# Patient Record
Sex: FEMALE | Race: WHITE | NOT HISPANIC OR LATINO | Employment: OTHER | URBAN - METROPOLITAN AREA
[De-identification: names, ages, dates, MRNs, and addresses within clinical notes are randomized per-mention and may not be internally consistent; named-entity substitution may affect disease eponyms.]

---

## 2017-01-12 ENCOUNTER — GENERIC CONVERSION - ENCOUNTER (OUTPATIENT)
Dept: OTHER | Facility: OTHER | Age: 62
End: 2017-01-12

## 2017-10-03 DIAGNOSIS — Z13.820 ENCOUNTER FOR SCREENING FOR OSTEOPOROSIS: ICD-10-CM

## 2017-10-03 DIAGNOSIS — Z12.31 ENCOUNTER FOR SCREENING MAMMOGRAM FOR MALIGNANT NEOPLASM OF BREAST: ICD-10-CM

## 2017-10-20 ENCOUNTER — ALLSCRIPTS OFFICE VISIT (OUTPATIENT)
Dept: OTHER | Facility: OTHER | Age: 62
End: 2017-10-20

## 2017-12-01 ENCOUNTER — HOSPITAL ENCOUNTER (OUTPATIENT)
Dept: MAMMOGRAPHY | Facility: CLINIC | Age: 62
Discharge: HOME | End: 2017-12-01
Payer: COMMERCIAL

## 2017-12-01 DIAGNOSIS — Z12.31 ENCOUNTER FOR SCREENING MAMMOGRAM FOR MALIGNANT NEOPLASM OF BREAST: ICD-10-CM

## 2017-12-01 PROCEDURE — 77063 BREAST TOMOSYNTHESIS BI: CPT

## 2017-12-01 PROCEDURE — G0202 SCR MAMMO BI INCL CAD: HCPCS

## 2018-01-02 ENCOUNTER — GENERIC CONVERSION - ENCOUNTER (OUTPATIENT)
Dept: OTHER | Facility: OTHER | Age: 63
End: 2018-01-02

## 2018-01-05 ENCOUNTER — LAB CONVERSION - ENCOUNTER (OUTPATIENT)
Dept: OTHER | Facility: OTHER | Age: 63
End: 2018-01-05

## 2018-01-05 LAB
ADDITIONAL INFORMATION (HISTORICAL): NORMAL
ADEQUACY: (HISTORICAL): NORMAL
COMMENT (HISTORICAL): NORMAL
CYTOTECHNOLOGIST: (HISTORICAL): NORMAL
HPV MRNA E6/E7 (HISTORICAL): NOT DETECTED
INTERPRETATION (HISTORICAL): NORMAL
LMP (HISTORICAL): NORMAL
PREV. BX: (HISTORICAL): NORMAL
PREV. PAP (HISTORICAL): NORMAL
SOURCE (HISTORICAL): NORMAL

## 2018-01-15 DIAGNOSIS — R39.15 URGENCY OF URINATION: ICD-10-CM

## 2018-01-16 NOTE — PROGRESS NOTES
Assessment    1  Encounter for screening mammogram for breast cancer (V76 12) (Z12 31)   2  Encounter for annual physical exam (V70 0) (Z00 00)   3  Thyroid nodule (241 0) (E04 1)    Plan  Encounter for annual physical exam    · Always use a seat belt and shoulder strap when riding or driving a motor vehicle ;  Status:Complete;   Done: 67JAJ1503 08:53AM   · Begin or continue regular aerobic exercise  Gradually work up to at least {count1}  sessions of {dur1} of exercise a week ; Status:Complete;   Done: 48BOP1274 08:53AM   · Brush your teeth {freq1} and floss at least once a day ; Status:Complete;   Done:  38OJY3438 08:53AM   · Drink plenty of fluids ; Status:Complete;   Done: 92NEP5087 08:53AM   · Eat a normal well-balanced diet ; Status:Complete;   Done: 45XWV7963 08:53AM   · Use a sun block product with an SPF of 15 or more ; Status:Complete;   Done:  25HAY9644 08:53AM   · We encourage all of our patients to exercise regularly  30 minutes of exercise or physical  activity five or more days a week is recommended for children and adults ;  Status:Complete;   Done: 34ONC2182 08:53AM   · We recommend routine visits to a dentist ; Status:Complete;   Done: 59LZZ5917 08:53AM  Encounter for screening mammogram for breast cancer    · * MAMMO SCREENING BILATERAL W CAD; Status:Canceled;    · MAMMO SCREENING BILATERAL W 3D & CAD; Status:Canceled - Scheduling;    · MAMMO SCREENING BILATERAL W 3D & CAD; Status:Hold For - Scheduling; Requested  for:20Oct2017;     Discussion/Summary  health maintenance visit Currently, she eats an adequate diet and has an adequate exercise regimen  cervical cancer screening is current cervical cancer screening is managed by Dr Ketan Dunlap Breast cancer screening: the risks and benefits of breast cancer screening were discussed, mammogram has been ordered and mammogram is needed every year   Colorectal cancer screening: the risks and benefits of colorectal cancer screening were discussed and colonoscopy is needed every five years  Osteoporosis screening: will bring copy of [facility] screening to next appt  The risks and benefits of immunizations were discussed and patient declines immunizations  Advice and education were given regarding nutrition, aerobic exercise, weight loss, sunscreen use and seat belt use  Patient discussion: discussed with the patient  Will get copy of [facility]/BW and Thyroid US from Dr Olya Montalvo office    WIll have Derm - Dr Elham Baires - send copy of recent notes    PAP in allscripts    Pt will bring [facility] screening results to next appt for DEXA    Thyroid US - will await records/US  The patient was counseled regarding instructions for management, risk factor reductions, prognosis, patient and family education, impressions, risks and benefits of treatment options  Chief Complaint  NP PE      History of Present Illness  HM, Adult Female: The patient is being seen for a health maintenance evaluation  The last health maintenance visit was 1 year(s) ago  Social History: She is   Work status: working full time and occupation: CPA  The patient has never smoked cigarettes  She reports occasional alcohol use and drinking 2 drinks per week  Alcohol concern:  no personal concern about alcohol use  She has never used illicit drugs  General Health: The patient's health since the last visit is described as good  She has regular dental visits  She denies vision problems  She denies hearing loss  Immunizations status:  wears glasses, follows with optho regularly  Lifestyle:  She does not have a healthy diet  She does not have any weight concerns  She exercises regularly  She does not use tobacco  She consumes alcohol  She denies drug use  admits could cut back on sweets but does also eat fruits and veggies, cycles and did her first 5K last week     Reproductive health: the patient is postmenopausal    Screening: Cervical cancer screening includes a pap smear performed last year  Breast cancer screening includes a mammogram performed last year  Colorectal cancer screening includes a colonoscopy performed within the past ten years  Metabolic screening includes uncertain timing of her last lipid profile, uncertain timing of her last glucose screening, uncertain timing of her last thyroid function test and uncertain timing of her last DEXA  Cardiovascular risk factors: high LDL cholesterol, but no hypertension and no diabetes  General health risks: no previous breast cancer and no abnormal cervical cytology  Safety elements used: seat belt and sunscreen  Risk findings: no anxiety symptoms and no depression symptoms  HPI: Pt here for PE and here to establish care - previous pt of Dr Abi Vasquez - has since moved [address]    No BW in a few years - does think the only abnormality was a low Vit D    Does [facility] screenings every other year - thinks her TG and TC are "borderline"    PAP 2016 - no h/o abnormal PAP    Mammo Nov 2016 - no h/o abnormal mammo    She has had a colonoscopy - Dr Michele Harry - she follows with him once a year but was told her colo is every 3 yrs - she thinks she has a rectal polyp    Has been told she had a thyroid nodule    Has a dairy / lactose intolerance - when she eats a dairy food she will have loose stools and fecal urgency - no blood in the stool    She has never had flu vaccine as "her previous doctor didn't believe in it", risks/benefits reviewed      Review of Systems    Constitutional: no fever and no chills  Eyes: no eyesight problems  ENT: no sore throat and no nasal discharge  Cardiovascular: no chest pain, no palpitations and no lower extremity edema  Respiratory: no shortness of breath, no cough and no wheezing  Gastrointestinal: no abdominal pain, no constipation and no diarrhea  Genitourinary: no dysuria and no incontinence  Musculoskeletal: joint stiffness, but no joint swelling  Integumentary: no rashes     Neurological: no headache and no dizziness  Psychiatric: no anxiety and no depression  Endocrine: no muscle weakness  Hematologic/Lymphatic: no tendency for easy bleeding and no tendency for easy bruising  Active Problems    1  Colon cancer screening (V76 51) (Z12 11)   2  Encounter for gynecological examination (V72 31) (Z01 419)   3  Encounter for screening mammogram for breast cancer (V76 12) (Z12 31)   4  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)   5  Screening for osteoporosis (V82 81) (Z13 820)    Past Medical History    · Denied: History of Alcohol abuse   · History of Diverticulosis (562 10) (K57 90)   · Denied: History of depression   · Denied: History of substance abuse    Surgical History    · History of Hand Excision Of Tendon Cyst    Family History  Mother    · Denied: Family history of Alcohol abuse   · Denied: Family history of depression   · Family history of hypertension (V17 49) (Z82 49)   · Family history of malignant neoplasm of breast (V16 3) (Z80 3)   · Family history of stroke (V17 1) (Z82 3)   · Denied: Family history of substance abuse  Father    · Denied: Family history of Alcohol abuse   · Denied: Family history of depression   · Family history of heart attack (V17 3) (Z82 49)   · Denied: Family history of substance abuse  Sibling    · Denied: Family history of Alcohol abuse   · Denied: Family history of depression   · Denied: Family history of substance abuse  Brother    · Family history of diabetes mellitus (V18 0) (Z83 3)   · Family history of type C viral hepatitis (V18 8) (Z83 1)   · Family history of Kidney transplanted    Social History    · Full-time employment   · Never a smoker   · Rarely consumes alcohol (V49 89) (Z78 9)   ·  (V61 07) (Z63 4)    Current Meds   1  Multivitamins Oral Capsule; 1 tab po q day;    Therapy: [id number] to Recorded    Allergies    1  iodine    Vitals   Recorded: 12BAR3348 08:14AM   Temperature 98 2 F   Heart Rate 68   Systolic 111   Diastolic 70   Height 5 ft 2 in   Weight 126 lb    BMI Calculated 23 05   BSA Calculated 1 57     Physical Exam    Constitutional   General appearance: No acute distress, well appearing and well nourished  Eyes   Conjunctiva and lids: No swelling, erythema or discharge  Ears, Nose, Mouth, and Throat   External inspection of ears and nose: Normal     Otoscopic examination: Tympanic membranes translucent with normal light reflex  Canals patent without erythema  Lips, teeth, and gums: Normal, good dentition  Neck   Neck: Supple, symmetric, trachea midline, no masses  Thyroid: Abnormal   R upper pole thyroid nodule on exam    Pulmonary   Respiratory effort: No increased work of breathing or signs of respiratory distress  Auscultation of lungs: Clear to auscultation  Cardiovascular   Auscultation of heart: Normal rate and rhythm, normal S1 and S2, no murmurs  Examination of extremities for edema and/or varicosities: Normal     Abdomen   Abdomen: Non-tender, no masses  Lymphatic   Palpation of lymph nodes in neck: No lymphadenopathy  Musculoskeletal   Gait and station: Normal     Skin   Skin and subcutaneous tissue: Normal without rashes or lesions  Psychiatric   Judgment and insight: Normal     Mood and affect: Normal        Results/Data  PHQ-2 Adult Depression Screening 20Oct2017 08:17AM User, s     Test Name Result Flag Reference   PHQ-2 Adult Depression Score 0     Over the last two weeks, how often have you been bothered by any of the following problems?   Little interest or pleasure in doing things: Not at all - 0  Feeling down, depressed, or hopeless: Not at all - 0   PHQ-2 Adult Depression Screening Negative         Signatures   Electronically signed by : Sergio Luevano DO; Oct 20 2017  8:55AM EST                       (Author)

## 2018-01-22 VITALS
WEIGHT: 126 LBS | TEMPERATURE: 98.2 F | BODY MASS INDEX: 23.19 KG/M2 | HEART RATE: 68 BPM | SYSTOLIC BLOOD PRESSURE: 120 MMHG | DIASTOLIC BLOOD PRESSURE: 70 MMHG | HEIGHT: 62 IN

## 2018-01-24 VITALS
SYSTOLIC BLOOD PRESSURE: 114 MMHG | DIASTOLIC BLOOD PRESSURE: 72 MMHG | WEIGHT: 125.25 LBS | HEIGHT: 62 IN | BODY MASS INDEX: 23.05 KG/M2

## 2018-07-30 DIAGNOSIS — B00.9 HSV INFECTION: Primary | ICD-10-CM

## 2018-10-22 PROBLEM — E04.1 THYROID NODULE: Status: ACTIVE | Noted: 2017-10-20

## 2018-10-23 ENCOUNTER — OFFICE VISIT (OUTPATIENT)
Dept: FAMILY MEDICINE CLINIC | Facility: HOSPITAL | Age: 63
End: 2018-10-23
Payer: COMMERCIAL

## 2018-10-23 VITALS
HEIGHT: 62 IN | SYSTOLIC BLOOD PRESSURE: 112 MMHG | HEART RATE: 77 BPM | DIASTOLIC BLOOD PRESSURE: 78 MMHG | WEIGHT: 118 LBS | TEMPERATURE: 98.3 F | BODY MASS INDEX: 21.71 KG/M2

## 2018-10-23 DIAGNOSIS — Z13.29 SCREENING FOR ENDOCRINE, METABOLIC, AND IMMUNITY DISORDER: ICD-10-CM

## 2018-10-23 DIAGNOSIS — Z00.00 ANNUAL PHYSICAL EXAM: Primary | ICD-10-CM

## 2018-10-23 DIAGNOSIS — Z13.228 SCREENING FOR ENDOCRINE, METABOLIC, AND IMMUNITY DISORDER: ICD-10-CM

## 2018-10-23 DIAGNOSIS — Z13.0 SCREENING FOR ENDOCRINE, METABOLIC, AND IMMUNITY DISORDER: ICD-10-CM

## 2018-10-23 DIAGNOSIS — Z12.31 ENCOUNTER FOR SCREENING MAMMOGRAM FOR BREAST CANCER: ICD-10-CM

## 2018-10-23 DIAGNOSIS — Z13.820 SCREENING FOR OSTEOPOROSIS: ICD-10-CM

## 2018-10-23 DIAGNOSIS — Z13.220 SCREENING FOR CHOLESTEROL LEVEL: ICD-10-CM

## 2018-10-23 PROBLEM — A60.00 GENITAL HERPES: Status: ACTIVE | Noted: 2018-10-23

## 2018-10-23 PROCEDURE — 99396 PREV VISIT EST AGE 40-64: CPT | Performed by: INTERNAL MEDICINE

## 2018-10-23 RX ORDER — PEDI MULTIVIT NO.91/IRON FUM 15 MG
TABLET,CHEWABLE ORAL
COMMUNITY
Start: 2014-07-01 | End: 2018-10-23

## 2018-10-23 RX ORDER — VALACYCLOVIR HYDROCHLORIDE 500 MG/1
TABLET, FILM COATED ORAL
COMMUNITY
Start: 2015-07-14 | End: 2019-05-03 | Stop reason: SDUPTHER

## 2018-10-23 NOTE — PROGRESS NOTES
Patient is here for a routine physical exam   Last physical was Oct 2017  Health Maintenance:  BW: no fasting labs in chart    Mammo: Dec 2017  Dexa: Had [facility] screening 2 yrs ago but is not sure if it was done  PAP: Jan 2018 - Dr Nataly Stewart    Colonoscopy: Dec 2013 - 5 yr follow up    Diet/exercise: well balanced diet with fruits and vegetables, goes to the [facility] 1 day a week     Dentist: every 6 mos - no current dental issues    Eye Exam: wears glasses or contacts, eye exam is scheduled for next week, no current issues    Review of Systems   Constitutional: Negative for chills, fatigue, fever and unexpected weight change  HENT: Negative for congestion and sore throat  Eyes: Negative for pain and visual disturbance  Respiratory: Negative for cough, shortness of breath and wheezing  Cardiovascular: Negative for chest pain, palpitations and leg swelling  Gastrointestinal: Negative for abdominal pain, constipation, diarrhea and nausea  Endocrine: Negative for polydipsia and polyuria  Genitourinary: Negative for difficulty urinating and dysuria  Musculoskeletal: Positive for arthralgias  Negative for back pain and neck pain  Skin: Negative for rash and wound  Neurological: Negative for dizziness, syncope and headaches  Hematological: Negative for adenopathy  Psychiatric/Behavioral: Negative for behavioral problems and confusion  Social History     Social History    Marital status:       Spouse name: N/A    Number of children: N/A    Years of education: N/A     Occupational History     [employer]     Retired     Social History Main Topics    Smoking status: Never Smoker    Smokeless tobacco: Never Used    Alcohol use Yes      Comment: rarely    Drug use: No    Sexual activity: Not on file     Other Topics Concern    Not on file     Social History Narrative    , lives alone, working full time     family history includes Breast cancer in her mother; Diabetes in her brother; Heart attack in her father; Hepatitis in her brother; Hypertension in her mother; Kidney disease in her brother; Stroke in her mother      has no past medical history of Alcohol abuse; Depression; or Substance abuse ([facility] [address] )  Allergies   Allergen Reactions    Fish-Derived Products      Other reaction(s): flounder and rajni give her hives    Iodine Abdominal Pain and Hives         There is no immunization history on file for this patient  Vitals:    10/23/18 0737   BP: 112/78   Pulse: 77   Temp: 98 3 °F (36 8 °C)     Physical Exam   Constitutional: She appears well-developed and well-nourished  No distress  HENT:   Head: Normocephalic and atraumatic  Right Ear: External ear normal    Left Ear: External ear normal    Mouth/Throat: Oropharynx is clear and moist    Eyes: Conjunctivae are normal  Right eye exhibits no discharge  Left eye exhibits no discharge  Neck: Neck supple  No tracheal deviation present  Cardiovascular: Normal rate, regular rhythm and normal heart sounds  No murmur heard  Neg bruits B/L   Pulmonary/Chest: Effort normal and breath sounds normal  No respiratory distress  She has no wheezes  Abdominal: Soft  There is no tenderness  There is no guarding  Musculoskeletal: Normal range of motion  She exhibits no edema  Lymphadenopathy:     She has no cervical adenopathy  Neurological: She is alert  She exhibits normal muscle tone  Skin: Skin is warm and dry  No pallor  Psychiatric: She has a normal mood and affect  Her behavior is normal  Judgment normal    Nursing note and vitals reviewed          David Delgado was seen today for physical exam     Diagnoses and all orders for this visit:    Annual physical exam      - Healthy diet and exercise 5 days a week was encouraged      - Encouraged to con't with dentist and eye exam regularly      - Flu vaccine was encouraged and pt deferring at this time      - Routine BW was ordered    Screening for osteoporosis  -     DXA bone density spine hip and pelvis; Future    Encounter for screening mammogram for breast cancer  -     Mammo screening bilateral w cad;  Future    Screening for cholesterol level  -     Lipid panel    Screening for endocrine, metabolic, and immunity disorder  -     CBC and differential  -     Comprehensive metabolic panel      Return in about 1 year (around 10/23/2019) for Annual physical

## 2019-01-22 ENCOUNTER — TRANSCRIBE ORDERS (OUTPATIENT)
Dept: ADMINISTRATIVE | Facility: HOSPITAL | Age: 64
End: 2019-01-22

## 2019-01-22 ENCOUNTER — ANNUAL EXAM (OUTPATIENT)
Dept: OBGYN CLINIC | Facility: CLINIC | Age: 64
End: 2019-01-22
Payer: COMMERCIAL

## 2019-01-22 VITALS
BODY MASS INDEX: 22.26 KG/M2 | SYSTOLIC BLOOD PRESSURE: 120 MMHG | HEIGHT: 62 IN | DIASTOLIC BLOOD PRESSURE: 70 MMHG | WEIGHT: 121 LBS

## 2019-01-22 DIAGNOSIS — Z01.419 WOMEN'S ANNUAL ROUTINE GYNECOLOGICAL EXAMINATION: Primary | ICD-10-CM

## 2019-01-22 DIAGNOSIS — Z12.31 VISIT FOR SCREENING MAMMOGRAM: Primary | ICD-10-CM

## 2019-01-22 PROCEDURE — G0143 SCR C/V CYTO,THINLAYER,RESCR: HCPCS | Performed by: OBSTETRICS & GYNECOLOGY

## 2019-01-22 PROCEDURE — 99396 PREV VISIT EST AGE 40-64: CPT | Performed by: OBSTETRICS & GYNECOLOGY

## 2019-01-22 PROCEDURE — 87624 HPV HI-RISK TYP POOLED RSLT: CPT | Performed by: OBSTETRICS & GYNECOLOGY

## 2019-01-22 NOTE — PATIENT INSTRUCTIONS
The patient was informed of a stable gyn examination  A Pap smear was performed  We may consider getting a DEXA scan within the next 2 years  She will continue yearly mammograms  Return my office in 1 year  Continue to monitor and outbreaks of any genital herpes

## 2019-01-22 NOTE — PROGRESS NOTES
HPI    this is a 81-year-old white female, she is a  1 para 1 1 vaginal delivery approximately 22 years ago  She is now in menopause  Menopause symptoms are under control  She is not in a sexual relation with the present time  She denies any major  GI complaints  She does have a history of diverticulosis  She is due for a colonoscopy soon  She also has a history of thyroid nodule that is being watch which is benign  She is happy with her weight  She exercises on a regular basis  Denies any problem depression or anxiety  She sees a dentist on a regular basis  She does have a history recurrent genital herpes  Her last attack was almost a year ago  She is using over-the-counter preparations which are helpful  There are no new major family illnesses report this time  REVIEW OF SYSTEMS   all systems negative except for occasional recurrent genital herpes, kidney stones under control  PAST MEDICAL HISTORY     positive for genital herpes      PAST SURGICAL HISTORY    negative for any major abdominal pelvic surgery      FAMILY HISTORY    positive for alcohol abuse, positive for depression, positive for hypertension, positive for breast cancer, positive for stroke, positive for diabetes history of family kidney transplant      SOCIAL   HISTORY    negative for tobacco positive for social alcohol she is a       PHYSICAL EXAM    this is a well-developed well-nourished white female in no acute distress  Her BMI is 22 1  Her HEENT is was within normal limits  There is a history of a thyroid nodule on the right side  This is not palpable on today's exam   Cardiac exam shows a regular rhythm and rate normal S1-S2  There is no murmur  Her lungs are clear to auscultation bilaterally  Her breast exam symmetrical nontender does have a history of for 6 changes of the breast but unremarkable  Axilla clear bilaterally  She continue to get yearly mammograms    Abdominal exam is soft nontender no retraction or dimpling no masses  Positive bowel sounds  Pelvic exam the external genitalia normal limits the vagina is clean the cervix is closed uterus is anterior normal size is no cervical motion tenderness  A Pap smear was performed  There is no evidence of prolapse  IMPRESSION   stable menopausal gyn examination  Not in a sexual relationship  Menopause symptoms are under control  She continued to get colonoscopy as directed  We may consider arranging for a DEXA scan  She should return my office in 1 year

## 2019-01-28 LAB
HPV HR 12 DNA CVX QL NAA+PROBE: NEGATIVE
HPV16 DNA CVX QL NAA+PROBE: NEGATIVE
HPV18 DNA CVX QL NAA+PROBE: NEGATIVE

## 2019-01-29 LAB
LAB AP GYN PRIMARY INTERPRETATION: NORMAL
Lab: NORMAL

## 2019-05-03 DIAGNOSIS — B00.9 RECURRENT HERPES SIMPLEX: Primary | ICD-10-CM

## 2019-05-03 RX ORDER — VALACYCLOVIR HYDROCHLORIDE 500 MG/1
500 TABLET, FILM COATED ORAL DAILY
Qty: 90 TABLET | Refills: 2 | Status: SHIPPED | OUTPATIENT
Start: 2019-05-03 | End: 2022-06-19

## 2019-05-08 ENCOUNTER — TELEPHONE (OUTPATIENT)
Dept: OBGYN CLINIC | Facility: CLINIC | Age: 64
End: 2019-05-08

## 2019-05-08 ENCOUNTER — APPOINTMENT (OUTPATIENT)
Dept: LAB | Facility: HOSPITAL | Age: 64
End: 2019-05-08
Payer: COMMERCIAL

## 2019-05-08 DIAGNOSIS — R39.15 URINARY URGENCY: ICD-10-CM

## 2019-05-08 DIAGNOSIS — N30.00 ACUTE CYSTITIS WITHOUT HEMATURIA: ICD-10-CM

## 2019-05-08 DIAGNOSIS — N30.00 ACUTE CYSTITIS WITHOUT HEMATURIA: Primary | ICD-10-CM

## 2019-05-08 LAB
BACTERIA UR QL AUTO: ABNORMAL /HPF
BILIRUB UR QL STRIP: NEGATIVE
CLARITY UR: CLEAR
COLOR UR: YELLOW
GLUCOSE UR STRIP-MCNC: NEGATIVE MG/DL
HGB UR QL STRIP.AUTO: ABNORMAL
KETONES UR STRIP-MCNC: ABNORMAL MG/DL
LEUKOCYTE ESTERASE UR QL STRIP: ABNORMAL
MUCOUS THREADS UR QL AUTO: ABNORMAL
NITRITE UR QL STRIP: NEGATIVE
NON-SQ EPI CELLS URNS QL MICRO: ABNORMAL /HPF
PH UR STRIP.AUTO: 5.5 [PH]
PROT UR STRIP-MCNC: NEGATIVE MG/DL
RBC #/AREA URNS AUTO: ABNORMAL /HPF
SP GR UR STRIP.AUTO: 1.02 (ref 1–1.03)
UROBILINOGEN UR QL STRIP.AUTO: 0.2 E.U./DL
WBC #/AREA URNS AUTO: ABNORMAL /HPF

## 2019-05-08 PROCEDURE — 87086 URINE CULTURE/COLONY COUNT: CPT

## 2019-05-08 PROCEDURE — 81001 URINALYSIS AUTO W/SCOPE: CPT

## 2019-05-08 RX ORDER — NITROFURANTOIN 25; 75 MG/1; MG/1
100 CAPSULE ORAL 2 TIMES DAILY
Qty: 14 CAPSULE | Refills: 0 | Status: SHIPPED | OUTPATIENT
Start: 2019-05-08 | End: 2019-05-15

## 2019-05-08 RX ORDER — NITROFURANTOIN MACROCRYSTALS 50 MG/1
50 CAPSULE ORAL AS NEEDED
Qty: 30 CAPSULE | Refills: 1 | Status: SHIPPED | OUTPATIENT
Start: 2019-05-08 | End: 2019-10-24

## 2019-05-09 LAB — BACTERIA UR CULT: NORMAL

## 2019-06-04 ENCOUNTER — HOSPITAL ENCOUNTER (OUTPATIENT)
Dept: BONE DENSITY | Facility: IMAGING CENTER | Age: 64
Discharge: HOME | End: 2019-06-04
Payer: COMMERCIAL

## 2019-06-04 VITALS — BODY MASS INDEX: 21.71 KG/M2 | HEIGHT: 62 IN | WEIGHT: 118 LBS

## 2019-06-04 DIAGNOSIS — Z12.31 ENCOUNTER FOR SCREENING MAMMOGRAM FOR BREAST CANCER: ICD-10-CM

## 2019-06-04 PROCEDURE — 77067 SCR MAMMO BI INCL CAD: CPT

## 2019-06-04 PROCEDURE — 77063 BREAST TOMOSYNTHESIS BI: CPT

## 2019-10-23 ENCOUNTER — TELEPHONE (OUTPATIENT)
Dept: FAMILY MEDICINE CLINIC | Facility: HOSPITAL | Age: 64
End: 2019-10-23

## 2019-10-24 ENCOUNTER — OFFICE VISIT (OUTPATIENT)
Dept: FAMILY MEDICINE CLINIC | Facility: HOSPITAL | Age: 64
End: 2019-10-24
Payer: COMMERCIAL

## 2019-10-24 VITALS
WEIGHT: 120 LBS | BODY MASS INDEX: 22.66 KG/M2 | SYSTOLIC BLOOD PRESSURE: 122 MMHG | DIASTOLIC BLOOD PRESSURE: 72 MMHG | HEIGHT: 61 IN | HEART RATE: 78 BPM | TEMPERATURE: 97.8 F

## 2019-10-24 DIAGNOSIS — Z13.0 SCREENING FOR ENDOCRINE, METABOLIC AND IMMUNITY DISORDER: ICD-10-CM

## 2019-10-24 DIAGNOSIS — E28.319 PREMATURE MENOPAUSE: ICD-10-CM

## 2019-10-24 DIAGNOSIS — E04.1 THYROID NODULE: ICD-10-CM

## 2019-10-24 DIAGNOSIS — Z13.29 SCREENING FOR ENDOCRINE, METABOLIC AND IMMUNITY DISORDER: ICD-10-CM

## 2019-10-24 DIAGNOSIS — Z13.29 SCREENING FOR THYROID DISORDER: ICD-10-CM

## 2019-10-24 DIAGNOSIS — Z00.00 ANNUAL PHYSICAL EXAM: Primary | ICD-10-CM

## 2019-10-24 DIAGNOSIS — G47.00 INSOMNIA, UNSPECIFIED TYPE: ICD-10-CM

## 2019-10-24 DIAGNOSIS — Z13.228 SCREENING FOR ENDOCRINE, METABOLIC AND IMMUNITY DISORDER: ICD-10-CM

## 2019-10-24 DIAGNOSIS — Z13.220 SCREENING FOR CHOLESTEROL LEVEL: ICD-10-CM

## 2019-10-24 PROCEDURE — 99396 PREV VISIT EST AGE 40-64: CPT | Performed by: INTERNAL MEDICINE

## 2019-10-24 NOTE — PROGRESS NOTES
Deepthi Chapa MD    NAME: Asa Bettencourt  AGE: 60 y o  SEX: female  : 1955     DATE: 10/24/2019     Assessment and Plan:     Problem List Items Addressed This Visit        Endocrine    Thyroid nodule    Relevant Orders    CBC and differential    Comprehensive metabolic panel    Lipid panel    TSH, 3rd generation with Free T4 reflex       Other    Insomnia    Relevant Orders    CBC and differential    Comprehensive metabolic panel    Lipid panel    TSH, 3rd generation with Free T4 reflex      Other Visit Diagnoses     Annual physical exam    -  Primary    Screening for thyroid disorder        Relevant Orders    TSH, 3rd generation with Free T4 reflex    Screening for cholesterol level        Relevant Orders    Lipid panel    Screening for endocrine, metabolic and immunity disorder        Relevant Orders    CBC and differential    Comprehensive metabolic panel    Premature menopause        Relevant Orders    DXA bone density spine hip and pelvis          Immunizations and preventive care screenings were discussed with patient today  Appropriate education was printed on patient's after visit summary  Counseling:  Alcohol/drug use: discussed moderation in alcohol intake, the recommendations for healthy alcohol use, and avoidance of illicit drug use  Dental Health: discussed importance of regular tooth brushing, flossing, and dental visits  Injury prevention: discussed safety/seat belts, safety helmets, smoke detectors, carbon dioxide detectors, and smoking near bedding or upholstery  · Exercise: the importance of regular exercise/physical activity was discussed  Recommend exercise 3-5 times per week for at least 30 minutes     · Breast cancer screenin/19  · Cervical cancer screenin/19  · Colon cancer screening: 10/19 - 5 yr d/t tubular adenoma     Pt deferring flu vaccine      Return in about 1 year (around 10/24/2020) for Annual physical      Chief Complaint:     Chief Complaint   Patient presents with    Annual Exam      History of Present Illness:     Adult Annual Physical   Patient here for a comprehensive physical exam  The patient reports no problems  She had her colonoscopy earlier this month with Dr Keke Mac  She has f/u coming up to go over results  She was noted to have a tubular adenoma at the sigmoid colon  Diet and Physical Activity  · Diet/Nutrition: well balanced diet, limited junk food and limited fruits/vegetables  · Exercise: 1-2 times a week on average and 25 miles a week on stationary bike at [facility]  Depression Screening  PHQ-9 Depression Screening    PHQ-9:    Frequency of the following problems over the past two weeks:       Little interest or pleasure in doing things:  0 - not at all  Feeling down, depressed, or hopeless:  0 - not at all  PHQ-2 Score:  0       General Health  · Sleep: sleeps poorly, gets 4-6 hours of sleep on average and snores loudly  She has had a sleep study in the past and was not noted to have sleep apnea  · Hearing: decreased - bilateral  States she had a hearing test in the past and was told she had some high frequency loss  · Vision: no vision problems, most recent eye exam <1 year ago and wears glasses and contacts  · Dental: regular dental visits, brushes teeth twice daily and flosses regularly  /GYN Health  · Patient is: postmenopausal  · Last menstrual period: age 52  · Contraceptive method: postmenopausal   · PAP 1/19  · Mammo 1/19  · No baseline Dexa yet     Review of Systems:     Review of Systems   Constitutional: Negative for chills, fatigue, fever and unexpected weight change  HENT: Negative for congestion and sore throat  Eyes: Negative for pain and visual disturbance  Respiratory: Negative for cough, shortness of breath and wheezing  Cardiovascular: Negative for chest pain, palpitations and leg swelling     Gastrointestinal: Negative for abdominal pain, blood in stool, constipation, diarrhea and nausea  Endocrine: Negative for polydipsia and polyuria  Genitourinary: Negative for difficulty urinating and dysuria  Musculoskeletal: Negative for back pain and neck pain  Skin: Negative for rash and wound  Neurological: Negative for dizziness, light-headedness and headaches  Hematological: Negative for adenopathy  Psychiatric/Behavioral: Positive for sleep disturbance  Negative for behavioral problems and confusion  Past Medical History:     History reviewed  No pertinent past medical history  Past Surgical History:     Past Surgical History:   Procedure Laterality Date    CYST REMOVAL      hand excision of tendon cyst      Social History:     Social History     Socioeconomic History    Marital status:       Spouse name: None    Number of children: None    Years of education: None    Highest education level: None   Occupational History     Employer: [employer]     Comment: Retired   Social Needs    Financial resource strain: None    Food insecurity:     Worry: None     Inability: None    Transportation needs:     Medical: None     Non-medical: None   Tobacco Use    Smoking status: Never Smoker    Smokeless tobacco: Never Used   Substance and Sexual Activity    Alcohol use: Yes     Comment: occasional    Drug use: No    Sexual activity: Never   Lifestyle    Physical activity:     Days per week: None     Minutes per session: None    Stress: None   Relationships    Social connections:     Talks on phone: None     Gets together: None     Attends Gnosticism service: None     Active member of club or organization: None     Attends meetings of clubs or organizations: None     Relationship status: None    Intimate partner violence:     Fear of current or ex partner: None     Emotionally abused: None     Physically abused: None     Forced sexual activity: None   Other Topics Concern    None   Social History Narrative    , lives alone, working full time      Family History:     Family History   Problem Relation Age of Onset    Hypertension Mother     Breast cancer Mother 80    Stroke Mother     Heart attack Father     Diabetes Brother     Hepatitis Brother         type C viral    Kidney disease Brother         kidney transplant    Coronary artery disease Brother     No Known Problems Sister     No Known Problems Daughter     Ovarian cancer Paternal Grandmother         age unknown    No Known Problems Sister     No Known Problems Maternal Aunt     No Known Problems Maternal Aunt     No Known Problems Maternal Aunt     No Known Problems Paternal Aunt     No Known Problems Paternal Aunt     No Known Problems Paternal Aunt     No Known Problems Maternal Grandmother     No Known Problems Maternal Grandfather     No Known Problems Paternal Grandfather       Current Medications:     Current Outpatient Medications   Medication Sig Dispense Refill    Multiple Vitamin (MULTIVITAMINS PO) Take 1 tablet by mouth daily      valACYclovir (VALTREX) 500 mg tablet Take 1 tablet (500 mg total) by mouth daily for 90 days 1 tab PO q 12 hrs x 3 days as needed 90 tablet 2     No current facility-administered medications for this visit  Allergies: Allergies   Allergen Reactions    Fish-Derived Products      Other reaction(s): flounder and rajni give her hives    Iodine Abdominal Pain and Hives      Physical Exam:     /72 (BP Location: Right arm, Patient Position: Sitting, Cuff Size: Standard)   Pulse 78   Temp 97 8 °F (36 6 °C)   Ht 5' 1" (1 549 m)   Wt 54 4 kg (120 lb)   BMI 22 67 kg/m²     Physical Exam   Constitutional: She appears well-developed and well-nourished  No distress  HENT:   Head: Normocephalic and atraumatic  Right Ear: External ear normal    Left Ear: External ear normal    Mouth/Throat: Oropharynx is clear and moist  No oropharyngeal exudate     Eyes: Conjunctivae are normal  Right eye exhibits no discharge  Left eye exhibits no discharge  Neck: Neck supple  No tracheal deviation present  Cardiovascular: Normal rate, regular rhythm and normal heart sounds  Exam reveals no friction rub  No murmur heard  Pulmonary/Chest: Effort normal and breath sounds normal  No respiratory distress  She has no wheezes  She has no rales  Abdominal: Soft  She exhibits no distension  There is no tenderness  There is no rebound and no guarding  Musculoskeletal: She exhibits no edema  Lymphadenopathy:     She has no cervical adenopathy  Neurological: She is alert  She exhibits normal muscle tone  Skin: Skin is warm and dry  No rash noted  Psychiatric: She has a normal mood and affect  Her behavior is normal    Nursing note and vitals reviewed        Marily Tyler MD

## 2019-10-24 NOTE — PATIENT INSTRUCTIONS

## 2020-10-26 ENCOUNTER — OFFICE VISIT (OUTPATIENT)
Dept: FAMILY MEDICINE CLINIC | Facility: HOSPITAL | Age: 65
End: 2020-10-26
Payer: COMMERCIAL

## 2020-10-26 VITALS
DIASTOLIC BLOOD PRESSURE: 72 MMHG | WEIGHT: 126.6 LBS | SYSTOLIC BLOOD PRESSURE: 110 MMHG | HEART RATE: 72 BPM | HEIGHT: 62 IN | TEMPERATURE: 97.4 F | BODY MASS INDEX: 23.3 KG/M2

## 2020-10-26 DIAGNOSIS — Z00.00 ANNUAL PHYSICAL EXAM: Primary | ICD-10-CM

## 2020-10-26 DIAGNOSIS — Z23 ENCOUNTER FOR IMMUNIZATION: ICD-10-CM

## 2020-10-26 DIAGNOSIS — Z12.31 ENCOUNTER FOR SCREENING MAMMOGRAM FOR MALIGNANT NEOPLASM OF BREAST: ICD-10-CM

## 2020-10-26 PROCEDURE — 3008F BODY MASS INDEX DOCD: CPT | Performed by: INTERNAL MEDICINE

## 2020-10-26 PROCEDURE — 90471 IMMUNIZATION ADMIN: CPT | Performed by: INTERNAL MEDICINE

## 2020-10-26 PROCEDURE — 3725F SCREEN DEPRESSION PERFORMED: CPT | Performed by: INTERNAL MEDICINE

## 2020-10-26 PROCEDURE — 90682 RIV4 VACC RECOMBINANT DNA IM: CPT | Performed by: INTERNAL MEDICINE

## 2020-10-26 PROCEDURE — 99396 PREV VISIT EST AGE 40-64: CPT | Performed by: INTERNAL MEDICINE

## 2020-10-26 RX ORDER — CHOLECALCIFEROL (VITAMIN D3) 125 MCG
5000 CAPSULE ORAL DAILY
COMMUNITY

## 2020-11-17 ENCOUNTER — TELEPHONE (OUTPATIENT)
Dept: FAMILY MEDICINE CLINIC | Facility: HOSPITAL | Age: 65
End: 2020-11-17

## 2021-01-10 ENCOUNTER — HOSPITAL ENCOUNTER (OUTPATIENT)
Dept: MAMMOGRAPHY | Facility: IMAGING CENTER | Age: 66
Discharge: HOME | End: 2021-01-10
Payer: COMMERCIAL

## 2021-01-10 VITALS — BODY MASS INDEX: 20.14 KG/M2 | HEIGHT: 64 IN | WEIGHT: 118 LBS

## 2021-01-10 DIAGNOSIS — Z12.31 ENCOUNTER FOR SCREENING MAMMOGRAM FOR MALIGNANT NEOPLASM OF BREAST: ICD-10-CM

## 2021-01-10 DIAGNOSIS — Z12.31 VISIT FOR SCREENING MAMMOGRAM: ICD-10-CM

## 2021-01-10 PROCEDURE — 77067 SCR MAMMO BI INCL CAD: CPT

## 2021-01-10 PROCEDURE — 77063 BREAST TOMOSYNTHESIS BI: CPT

## 2021-03-22 ENCOUNTER — ANNUAL EXAM (OUTPATIENT)
Dept: OBGYN CLINIC | Facility: CLINIC | Age: 66
End: 2021-03-22
Payer: COMMERCIAL

## 2021-03-22 VITALS
HEIGHT: 62 IN | WEIGHT: 128.6 LBS | BODY MASS INDEX: 23.66 KG/M2 | SYSTOLIC BLOOD PRESSURE: 116 MMHG | DIASTOLIC BLOOD PRESSURE: 80 MMHG

## 2021-03-22 DIAGNOSIS — Z01.419 WOMEN'S ANNUAL ROUTINE GYNECOLOGICAL EXAMINATION: ICD-10-CM

## 2021-03-22 DIAGNOSIS — Z12.31 ENCOUNTER FOR SCREENING MAMMOGRAM FOR MALIGNANT NEOPLASM OF BREAST: Primary | ICD-10-CM

## 2021-03-22 PROCEDURE — 1036F TOBACCO NON-USER: CPT | Performed by: OBSTETRICS & GYNECOLOGY

## 2021-03-22 PROCEDURE — 99396 PREV VISIT EST AGE 40-64: CPT | Performed by: OBSTETRICS & GYNECOLOGY

## 2021-03-22 PROCEDURE — 3008F BODY MASS INDEX DOCD: CPT | Performed by: OBSTETRICS & GYNECOLOGY

## 2021-03-22 NOTE — PROGRESS NOTES
Assessment/Plan:      The patient was informed of a stable menopausal gyn examination  Mammograms colonoscopies up-to-date  We may consider call DEXA scan next year  She is content with her weight  She feels safe at home  She will get the COVID vaccine when available  She will continue to use Valtrex for any recurrent herpes outbreaks  Subjective:      Patient ID: Jean Marie Worthy is a 63 y o  female  HPI    This is a 63-year-old white female, she is a  1 para 1 1 vaginal delivery approximately 23 years ago  She is now menopausal   She is not in a sexual relationship  Menopause symptoms are under control  Colonoscopies and mammograms are up-to-date  She feels safe at home  She would like to get COVID vaccination when available  She denies any major  GI complaint  She does have a history of recurrent genital herpes which is under control  She has a dentist on a regular basis  She is content with her weight  Denies any problem depression or anxiety  She does have a history kidney stones during to control  There are no new major family illnesses report this time  The following portions of the patient's history were reviewed and updated as appropriate: allergies, current medications, past family history, past medical history, past social history, past surgical history and problem list     Review of Systems   All other systems reviewed and are negative  Objective:      /80   Ht 5' 2" (1 575 m)   Wt 58 3 kg (128 lb 9 6 oz)   BMI 23 52 kg/m²          Physical Exam  Vitals signs reviewed  Exam conducted with a chaperone present  Constitutional:       Appearance: Normal appearance  She is normal weight  HENT:      Head: Normocephalic  Neck:      Musculoskeletal: Normal range of motion and neck supple  Cardiovascular:      Rate and Rhythm: Normal rate and regular rhythm  Pulses: Normal pulses  Heart sounds: Normal heart sounds     Pulmonary: Effort: Pulmonary effort is normal       Breath sounds: Normal breath sounds  Chest:      Breasts:         Right: Normal          Left: Normal    Abdominal:      General: Abdomen is flat  Bowel sounds are normal  There is no distension  Palpations: Abdomen is soft  There is no hepatomegaly, splenomegaly or mass  Tenderness: There is no abdominal tenderness  Hernia: A hernia is present  There is no hernia in the left inguinal area or right inguinal area  Genitourinary:     General: Normal vulva  Pubic Area: No rash or pubic lice  Labia:         Right: No rash, tenderness, lesion or injury  Left: No rash, tenderness, lesion or injury  Urethra: No prolapse, urethral pain, urethral swelling or urethral lesion  Vagina: Normal  No signs of injury and foreign body  No vaginal discharge, erythema, tenderness, bleeding, lesions or prolapsed vaginal walls  Cervix: Normal       Uterus: Normal  Not deviated, not enlarged, not fixed, not tender and no uterine prolapse  Adnexa: Right adnexa normal and left adnexa normal         Right: No mass, tenderness or fullness  Left: No mass, tenderness or fullness  Rectum: Normal       Comments: The external genitalia within normal limits the vagina is clean consistent with menopause  The cervix is closed the uterus is small mobile nontender there is no cervical motion tenderness  Neck is clear bilaterally  There is no prolapse of the uterus  A Pap smear was not performed  The urethra bladder uterus all normal in appearance  Musculoskeletal: Normal range of motion  General: No swelling or tenderness  Skin:     General: Skin is warm and dry  Neurological:      General: No focal deficit present  Mental Status: She is alert and oriented to person, place, and time     Psychiatric:         Mood and Affect: Mood normal          Behavior: Behavior normal

## 2021-03-22 NOTE — PATIENT INSTRUCTIONS
The patient was informed of a stable menopausal gyn examination  She is content with her weight  She wants to get to COVID vaccine when available  Will consider DEXA scan next year after next visit  Colonoscopies up-to-date  Not sexually active  Screen to watch for recurrence genital herpes  She feels safe at home  She should call me if any new issues arise otherwise return my office in 1 year

## 2021-04-13 DIAGNOSIS — Z23 ENCOUNTER FOR IMMUNIZATION: ICD-10-CM

## 2021-10-12 ENCOUNTER — OFFICE VISIT (OUTPATIENT)
Dept: FAMILY MEDICINE CLINIC | Facility: HOSPITAL | Age: 66
End: 2021-10-12
Payer: COMMERCIAL

## 2021-10-12 VITALS
BODY MASS INDEX: 23.45 KG/M2 | DIASTOLIC BLOOD PRESSURE: 62 MMHG | WEIGHT: 127.4 LBS | TEMPERATURE: 97.9 F | HEIGHT: 62 IN | HEART RATE: 74 BPM | SYSTOLIC BLOOD PRESSURE: 108 MMHG

## 2021-10-12 DIAGNOSIS — Z13.228 SCREENING FOR ENDOCRINE, NUTRITIONAL, METABOLIC AND IMMUNITY DISORDER: ICD-10-CM

## 2021-10-12 DIAGNOSIS — Z01.818 PREOPERATIVE CLEARANCE: Primary | ICD-10-CM

## 2021-10-12 DIAGNOSIS — H25.9 AGE-RELATED CATARACT OF BOTH EYES, UNSPECIFIED AGE-RELATED CATARACT TYPE: ICD-10-CM

## 2021-10-12 DIAGNOSIS — Z13.220 SCREENING FOR CHOLESTEROL LEVEL: ICD-10-CM

## 2021-10-12 DIAGNOSIS — Z13.21 SCREENING FOR ENDOCRINE, NUTRITIONAL, METABOLIC AND IMMUNITY DISORDER: ICD-10-CM

## 2021-10-12 DIAGNOSIS — I44.7 LBBB (LEFT BUNDLE BRANCH BLOCK): ICD-10-CM

## 2021-10-12 DIAGNOSIS — Z13.0 SCREENING FOR ENDOCRINE, NUTRITIONAL, METABOLIC AND IMMUNITY DISORDER: ICD-10-CM

## 2021-10-12 DIAGNOSIS — Z13.29 SCREENING FOR ENDOCRINE, NUTRITIONAL, METABOLIC AND IMMUNITY DISORDER: ICD-10-CM

## 2021-10-12 PROCEDURE — 99214 OFFICE O/P EST MOD 30 MIN: CPT | Performed by: INTERNAL MEDICINE

## 2021-10-12 PROCEDURE — 93000 ELECTROCARDIOGRAM COMPLETE: CPT | Performed by: INTERNAL MEDICINE

## 2021-10-12 PROCEDURE — 3725F SCREEN DEPRESSION PERFORMED: CPT | Performed by: INTERNAL MEDICINE

## 2021-10-18 ENCOUNTER — OFFICE VISIT (OUTPATIENT)
Dept: OBGYN CLINIC | Facility: CLINIC | Age: 66
End: 2021-10-18
Payer: COMMERCIAL

## 2021-10-18 VITALS
WEIGHT: 131 LBS | DIASTOLIC BLOOD PRESSURE: 68 MMHG | BODY MASS INDEX: 24.11 KG/M2 | SYSTOLIC BLOOD PRESSURE: 110 MMHG | HEIGHT: 62 IN

## 2021-10-18 DIAGNOSIS — N95.0 POSTMENOPAUSAL BLEEDING: Primary | ICD-10-CM

## 2021-10-18 PROCEDURE — 99214 OFFICE O/P EST MOD 30 MIN: CPT | Performed by: OBSTETRICS & GYNECOLOGY

## 2021-10-18 PROCEDURE — 88305 TISSUE EXAM BY PATHOLOGIST: CPT | Performed by: PATHOLOGY

## 2021-10-18 PROCEDURE — 58100 BIOPSY OF UTERUS LINING: CPT | Performed by: OBSTETRICS & GYNECOLOGY

## 2021-10-18 PROCEDURE — 3008F BODY MASS INDEX DOCD: CPT | Performed by: OBSTETRICS & GYNECOLOGY

## 2021-10-18 PROCEDURE — 1036F TOBACCO NON-USER: CPT | Performed by: OBSTETRICS & GYNECOLOGY

## 2021-11-15 ENCOUNTER — APPOINTMENT (OUTPATIENT)
Dept: LAB | Facility: HOSPITAL | Age: 66
End: 2021-11-15
Payer: COMMERCIAL

## 2021-11-15 ENCOUNTER — HOSPITAL ENCOUNTER (OUTPATIENT)
Dept: NON INVASIVE DIAGNOSTICS | Age: 66
Discharge: HOME | End: 2021-11-15
Payer: COMMERCIAL

## 2021-11-15 ENCOUNTER — APPOINTMENT (OUTPATIENT)
Dept: ULTRASOUND IMAGING | Facility: HOSPITAL | Age: 66
End: 2021-11-15
Payer: COMMERCIAL

## 2021-11-15 ENCOUNTER — HOSPITAL ENCOUNTER (OUTPATIENT)
Dept: ULTRASOUND IMAGING | Facility: HOSPITAL | Age: 66
Discharge: HOME | End: 2021-11-15
Payer: COMMERCIAL

## 2021-11-15 VITALS
WEIGHT: 131 LBS | DIASTOLIC BLOOD PRESSURE: 68 MMHG | HEIGHT: 62 IN | BODY MASS INDEX: 24.11 KG/M2 | SYSTOLIC BLOOD PRESSURE: 110 MMHG

## 2021-11-15 DIAGNOSIS — N95.0 POSTMENOPAUSAL BLEEDING: ICD-10-CM

## 2021-11-15 DIAGNOSIS — I44.7 LBBB (LEFT BUNDLE BRANCH BLOCK): ICD-10-CM

## 2021-11-15 LAB
ALBUMIN SERPL BCP-MCNC: 3.6 G/DL (ref 3.5–5)
ALP SERPL-CCNC: 105 U/L (ref 46–116)
ALT SERPL W P-5'-P-CCNC: 20 U/L (ref 12–78)
ANION GAP SERPL CALCULATED.3IONS-SCNC: 12 MMOL/L (ref 4–13)
AORTIC ROOT: 2.8 CM
APICAL FOUR CHAMBER EJECTION FRACTION: 55 %
AST SERPL W P-5'-P-CCNC: 15 U/L (ref 5–45)
BASOPHILS NFR BLD AUTO: 1 % (ref 0–1)
BILIRUB SERPL-MCNC: 1.5 MG/DL (ref 0.2–1)
BUN SERPL-MCNC: 19 MG/DL (ref 5–25)
CALCIUM SERPL-MCNC: 8.6 MG/DL (ref 8.3–10.1)
CHLORIDE SERPL-SCNC: 99 MMOL/L (ref 100–108)
CHOLEST SERPL-MCNC: 164 MG/DL (ref 50–200)
CO2 SERPL-SCNC: 24 MMOL/L (ref 21–32)
CREAT SERPL-MCNC: 0.69 MG/DL (ref 0.6–1.3)
E WAVE DECELERATION TIME: 191 MS
EOSINOPHIL NFR BLD AUTO: 4 % (ref 0–6)
ERYTHROCYTE [DISTWIDTH] IN BLOOD BY AUTOMATED COUNT: 43.1 % (ref 11.6–15.1)
FRACTIONAL SHORTENING: 27 % (ref 28–44)
GFR SERPL CREATININE-BSD FRML MDRD: 92 ML/MIN/1.73SQ M
GLUCOSE P FAST SERPL-MCNC: 85 MG/DL (ref 65–99)
HCT VFR BLD AUTO: 40.8 % (ref 34.8–46.1)
HDLC SERPL-MCNC: 59 MG/DL
HGB BLD-MCNC: 12.7 G/DL (ref 11.5–15.4)
IMM GRANULOCYTES NFR BLD AUTO: 0 % (ref 0–2)
INTERVENTRICULAR SEPTUM IN DIASTOLE (PARASTERNAL SHORT AXIS VIEW): 0.6 CM
LAAS-AP2: 15.9 CM2
LAAS-AP4: 12.6 CM2
LDLC SERPL CALC-MCNC: 88 MG/DL (ref 0–100)
LEFT ATRIUM AREA SYSTOLE SINGLE PLANE A4C: 13 CM2
LEFT INTERNAL DIMENSION IN SYSTOLE: 3.2 CM (ref 2.1–4)
LEFT VENTRICLE DIASTOLIC VOLUME (MOD BIPLANE): 95 ML
LEFT VENTRICLE SYSTOLIC VOLUME (MOD BIPLANE): 38 ML
LEFT VENTRICULAR INTERNAL DIMENSION IN DIASTOLE: 4.4 CM (ref 3.76–5.59)
LEFT VENTRICULAR POSTERIOR WALL IN END DIASTOLE: 0.6 CM
LEFT VENTRICULAR STROKE VOLUME: 45 ML
LV EF: 60 %
LYMPHOCYTES NFR BLD AUTO: 30 % (ref 14–44)
MCH RBC QN AUTO: 29.2 PG (ref 26.8–34.3)
MCHC RBC AUTO-ENTMCNC: 31.3 G/DL (ref 31.4–37.4)
MCV RBC AUTO: 93 FL (ref 82–98)
MONOCYTES NFR BLD AUTO: 11 % (ref 4–12)
MV E'TISSUE VEL-SEP: 8 CM/S
MV PEAK A VEL: 1.09 M/S
MV PEAK E VEL: 67 CM/S
MV STENOSIS PRESSURE HALF TIME: 0 MS
NEUTS SEG NFR BLD AUTO: 54 % (ref 43–75)
NONHDLC SERPL-MCNC: 105 MG/DL
NRBC BLD AUTO-RTO: 0 /100 WBCS
PLATELET # BLD AUTO: 268 THOUSANDS/UL (ref 149–390)
PMV BLD AUTO: 9.3 FL (ref 8.9–12.7)
POTASSIUM SERPL-SCNC: 4.2 MMOL/L (ref 3.5–5.3)
PROT SERPL-MCNC: 7.4 G/DL (ref 6.4–8.2)
RBC # BLD AUTO: 4.35 MILLION/UL (ref 3.81–5.12)
RIGHT ATRIUM AREA SYSTOLE A4C: 10.8 CM2
RIGHT VENTRICLE ID DIMENSION: 2.8 CM
SL CV LV EF: 50
SL CV PED ECHO LEFT VENTRICLE DIASTOLIC VOLUME (MOD BIPLANE) 2D: 87 ML
SL CV PED ECHO LEFT VENTRICLE SYSTOLIC VOLUME (MOD BIPLANE) 2D: 42 ML
SODIUM SERPL-SCNC: 135 MMOL/L (ref 136–145)
TRICUSPID VALVE PEAK REGURGITATION VELOCITY: 2.26 M/S
TRICUSPID VALVE S': 42 CM/S
TRIGL SERPL-MCNC: 87 MG/DL
TV PEAK GRADIENT: 20 MMHG
WBC # BLD AUTO: 5.14 THOUSAND/UL (ref 4.31–10.16)
Z-SCORE OF LEFT VENTRICULAR DIMENSION IN END SYSTOLE: -0.41

## 2021-11-15 PROCEDURE — 36415 COLL VENOUS BLD VENIPUNCTURE: CPT | Performed by: INTERNAL MEDICINE

## 2021-11-15 PROCEDURE — 76830 TRANSVAGINAL US NON-OB: CPT

## 2021-11-15 PROCEDURE — 93306 TTE W/DOPPLER COMPLETE: CPT | Performed by: INTERNAL MEDICINE

## 2021-11-15 PROCEDURE — 85025 COMPLETE CBC W/AUTO DIFF WBC: CPT | Performed by: INTERNAL MEDICINE

## 2021-11-15 PROCEDURE — 93306 TTE W/DOPPLER COMPLETE: CPT

## 2021-11-15 PROCEDURE — 80053 COMPREHEN METABOLIC PANEL: CPT | Performed by: INTERNAL MEDICINE

## 2021-11-15 PROCEDURE — 80061 LIPID PANEL: CPT | Performed by: INTERNAL MEDICINE

## 2021-11-15 PROCEDURE — 76856 US EXAM PELVIC COMPLETE: CPT

## 2021-12-27 ENCOUNTER — TELEPHONE (OUTPATIENT)
Dept: FAMILY MEDICINE CLINIC | Facility: HOSPITAL | Age: 66
End: 2021-12-27

## 2021-12-31 PROCEDURE — U0005 INFEC AGEN DETEC AMPLI PROBE: HCPCS | Performed by: INTERNAL MEDICINE

## 2021-12-31 PROCEDURE — U0003 INFECTIOUS AGENT DETECTION BY NUCLEIC ACID (DNA OR RNA); SEVERE ACUTE RESPIRATORY SYNDROME CORONAVIRUS 2 (SARS-COV-2) (CORONAVIRUS DISEASE [COVID-19]), AMPLIFIED PROBE TECHNIQUE, MAKING USE OF HIGH THROUGHPUT TECHNOLOGIES AS DESCRIBED BY CMS-2020-01-R: HCPCS | Performed by: INTERNAL MEDICINE

## 2022-03-18 ENCOUNTER — HOSPITAL ENCOUNTER (OUTPATIENT)
Dept: MAMMOGRAPHY | Facility: IMAGING CENTER | Age: 67
Discharge: HOME | End: 2022-03-18
Payer: COMMERCIAL

## 2022-03-18 VITALS — WEIGHT: 130.07 LBS | BODY MASS INDEX: 23.94 KG/M2 | HEIGHT: 62 IN

## 2022-03-18 DIAGNOSIS — Z12.31 ENCOUNTER FOR SCREENING MAMMOGRAM FOR MALIGNANT NEOPLASM OF BREAST: ICD-10-CM

## 2022-03-18 DIAGNOSIS — Z12.31 VISIT FOR SCREENING MAMMOGRAM: ICD-10-CM

## 2022-03-18 PROCEDURE — 77067 SCR MAMMO BI INCL CAD: CPT

## 2022-03-18 PROCEDURE — 77063 BREAST TOMOSYNTHESIS BI: CPT

## 2022-06-14 ENCOUNTER — TELEPHONE (OUTPATIENT)
Dept: FAMILY MEDICINE CLINIC | Facility: HOSPITAL | Age: 67
End: 2022-06-14

## 2022-06-14 NOTE — TELEPHONE ENCOUNTER
Pt going to take another home covid test and call back with the results     c/o  bad cough sore throat  sinus prrssure congestion ear pain   no fever that she know of

## 2022-06-15 ENCOUNTER — OFFICE VISIT (OUTPATIENT)
Dept: FAMILY MEDICINE CLINIC | Facility: HOSPITAL | Age: 67
End: 2022-06-15
Payer: COMMERCIAL

## 2022-06-15 VITALS
TEMPERATURE: 98.5 F | BODY MASS INDEX: 23.37 KG/M2 | OXYGEN SATURATION: 97 % | WEIGHT: 127 LBS | SYSTOLIC BLOOD PRESSURE: 100 MMHG | HEART RATE: 95 BPM | DIASTOLIC BLOOD PRESSURE: 70 MMHG | HEIGHT: 62 IN

## 2022-06-15 DIAGNOSIS — J01.00 ACUTE MAXILLARY SINUSITIS, RECURRENCE NOT SPECIFIED: Primary | ICD-10-CM

## 2022-06-15 PROCEDURE — 99213 OFFICE O/P EST LOW 20 MIN: CPT | Performed by: FAMILY MEDICINE

## 2022-06-15 PROCEDURE — 1036F TOBACCO NON-USER: CPT | Performed by: FAMILY MEDICINE

## 2022-06-15 PROCEDURE — 3008F BODY MASS INDEX DOCD: CPT | Performed by: FAMILY MEDICINE

## 2022-06-15 RX ORDER — CYCLOSPORINE 0.5 MG/ML
1 EMULSION OPHTHALMIC EVERY 12 HOURS
COMMUNITY
Start: 2021-01-01

## 2022-06-15 RX ORDER — CEFUROXIME AXETIL 500 MG/1
500 TABLET ORAL EVERY 12 HOURS SCHEDULED
Qty: 14 TABLET | Refills: 0 | Status: SHIPPED | OUTPATIENT
Start: 2022-06-15 | End: 2022-06-22

## 2022-06-15 NOTE — PROGRESS NOTES
Assessment/Plan:         Diagnoses and all orders for this visit:    Acute maxillary sinusitis, recurrence not specified  Comments:  Start Fluticasone spray to help with congestion  Orders:  -     cefuroxime (CEFTIN) 500 mg tablet; Take 1 tablet (500 mg total) by mouth every 12 (twelve) hours for 7 days    Other orders  -     cycloSPORINE (RESTASIS) 0 05 % ophthalmic emulsion; Apply 1 drop to eye every 12 (twelve) hours          Subjective:      Patient ID: Weston Merino is a 61 y o  female  Acute visit for sinus symptoms    5 days of significant sinus congestion  Now with yellow sinus drainage  No fever she is aware of    Has spasmodic cough which causes some anterior pain  No wheezing    Taking vit C drink    Sleeping pretty well      The following portions of the patient's history were reviewed and updated as appropriate: allergies, current medications, past family history, past medical history, past social history, past surgical history and problem list     Review of Systems      Objective:      /70 (BP Location: Left arm, Patient Position: Sitting, Cuff Size: Standard)   Pulse 95   Temp 98 5 °F (36 9 °C) (Tympanic)   Ht 5' 2" (1 575 m)   Wt 57 6 kg (127 lb)   SpO2 97%   BMI 23 23 kg/m²          Physical Exam  Vitals and nursing note reviewed  Constitutional:       Appearance: Normal appearance  HENT:      Head: Normocephalic  Right Ear: Tympanic membrane is retracted  Left Ear: Hearing and tympanic membrane normal  Tympanic membrane is not retracted  Nose: Congestion present  Mouth/Throat:      Mouth: Mucous membranes are moist       Pharynx: No oropharyngeal exudate or posterior oropharyngeal erythema  Cardiovascular:      Rate and Rhythm: Normal rate and regular rhythm  Pulses: Normal pulses  Heart sounds: Normal heart sounds  Pulmonary:      Effort: Pulmonary effort is normal       Breath sounds: Normal breath sounds     Lymphadenopathy: Cervical: Cervical adenopathy present  Neurological:      Mental Status: She is alert

## 2022-06-19 ENCOUNTER — OFFICE VISIT (OUTPATIENT)
Dept: URGENT CARE | Facility: CLINIC | Age: 67
End: 2022-06-19
Payer: COMMERCIAL

## 2022-06-19 VITALS
OXYGEN SATURATION: 97 % | HEIGHT: 62 IN | HEART RATE: 89 BPM | SYSTOLIC BLOOD PRESSURE: 127 MMHG | RESPIRATION RATE: 18 BRPM | DIASTOLIC BLOOD PRESSURE: 66 MMHG | WEIGHT: 126 LBS | TEMPERATURE: 98.5 F | BODY MASS INDEX: 23.19 KG/M2

## 2022-06-19 DIAGNOSIS — J40 BRONCHITIS: ICD-10-CM

## 2022-06-19 DIAGNOSIS — J01.90 ACUTE SINUSITIS, RECURRENCE NOT SPECIFIED, UNSPECIFIED LOCATION: Primary | ICD-10-CM

## 2022-06-19 DIAGNOSIS — Z20.822 ENCOUNTER FOR LABORATORY TESTING FOR COVID-19 VIRUS: ICD-10-CM

## 2022-06-19 PROCEDURE — 87636 SARSCOV2 & INF A&B AMP PRB: CPT | Performed by: PHYSICIAN ASSISTANT

## 2022-06-19 PROCEDURE — 99213 OFFICE O/P EST LOW 20 MIN: CPT | Performed by: PHYSICIAN ASSISTANT

## 2022-06-19 RX ORDER — AZITHROMYCIN 250 MG/1
TABLET, FILM COATED ORAL
Qty: 6 TABLET | Refills: 0 | Status: SHIPPED | OUTPATIENT
Start: 2022-06-19 | End: 2022-06-23

## 2022-06-19 RX ORDER — PREDNISONE 10 MG/1
TABLET ORAL
Qty: 20 TABLET | Refills: 0 | Status: SHIPPED | OUTPATIENT
Start: 2022-06-19 | End: 2022-07-18

## 2022-06-19 RX ORDER — BENZONATATE 100 MG/1
100 CAPSULE ORAL 3 TIMES DAILY PRN
Qty: 20 CAPSULE | Refills: 0 | Status: SHIPPED | OUTPATIENT
Start: 2022-06-19 | End: 2022-07-18

## 2022-06-19 NOTE — PROGRESS NOTES
[facility]        NAME: Ashely Vivar is a 61 y o  female  : 1955    MRN: [id number]  DATE: 2022  TIME: 4:37 PM    /66   Pulse 89   Temp 98 5 °F (36 9 °C)   Resp 18   Ht 5' 2" (1 575 m)   Wt 57 2 kg (126 lb)   SpO2 97%   BMI 23 05 kg/m²     Assessment and Plan   Acute sinusitis, recurrence not specified, unspecified location [J01 90]  1  Acute sinusitis, recurrence not specified, unspecified location  azithromycin (ZITHROMAX) 250 mg tablet    benzonatate (TESSALON PERLES) 100 mg capsule    predniSONE 10 mg tablet   2  Bronchitis  azithromycin (ZITHROMAX) 250 mg tablet    benzonatate (TESSALON PERLES) 100 mg capsule    predniSONE 10 mg tablet   3  Encounter for laboratory testing for COVID-19 virus  Cov[facility]Flu-Col[facility] at [facility] or [facility]         Patient Instructions       Follow up with PCP in 3-5 days  Proceed to  ER if symptoms worsen  Chief Complaint     Chief Complaint   Patient presents with    Facial Pain     Sinus congestion since , went to PCP 5 days ago and given cefuroxime with minimal relief, pt with a cough that is getting worse         History of Present Illness       Pt with nasal congestion and sinus pressure and productive cough for about 5-6 days, pt on ceftin from family doctor "not helping at all"      Review of Systems   Review of Systems   HENT: Positive for congestion, sinus pressure and sinus pain  Eyes: Negative  Respiratory: Positive for cough  Cardiovascular: Negative  Gastrointestinal: Negative  Endocrine: Negative  Genitourinary: Negative  Musculoskeletal: Negative  Allergic/Immunologic: Negative  Neurological: Negative  Hematological: Negative  Psychiatric/Behavioral: Negative  All other systems reviewed and are negative          Current Medications       Current Outpatient Medications:     azithromycin (ZITHROMAX) 250 mg tablet, Take 2 tablets today then 1 tablet daily x 4 days, Disp: 6 tablet, Rfl: 0    benzonatate (TESSALON PERLES) 100 mg capsule, Take 1 capsule (100 mg total) by mouth 3 (three) times a day as needed for cough, Disp: 20 capsule, Rfl: 0    cefuroxime (CEFTIN) 500 mg tablet, Take 1 tablet (500 mg total) by mouth every 12 (twelve) hours for 7 days, Disp: 14 tablet, Rfl: 0    Cholecalciferol (Vitamin D3 Ultra Strength) 125 MCG (5000 UT) capsule, Take 5,000 Units by mouth daily, Disp: , Rfl:     cycloSPORINE (RESTASIS) 0 05 % ophthalmic emulsion, Apply 1 drop to eye every 12 (twelve) hours, Disp: , Rfl:     Multiple Vitamin (MULTIVITAMINS PO), Take 1 tablet by mouth daily, Disp: , Rfl:     predniSONE 10 mg tablet, 4 tabs po qd x 2 days then 3 tabs po qd x 2 days then 2 tabs po qd x 2 days then 1 tab po qd x 2 days, Disp: 20 tablet, Rfl: 0    Probiotic Product (PROBIOTIC ADVANCED PO), Take 1 tablet by mouth daily, Disp: , Rfl:     valACYclovir (VALTREX) 500 mg tablet, Take 1 tablet (500 mg total) by mouth daily for 90 days 1 tab PO q 12 hrs x 3 days as needed, Disp: 90 tablet, Rfl: 2    Current Allergies     Allergies as of 06/19/2022 - Reviewed 06/19/2022   Allergen Reaction Noted    Fish-derived products - food allergy  08/05/2015    Iodine - food allergy Abdominal Pain and Hives 08/02/2016            The following portions of the patient's history were reviewed and updated as appropriate: allergies, current medications, past family history, past medical history, past social history, past surgical history and problem list      Past Medical History:   Diagnosis Date    Thyroid nodule        Past Surgical History:   Procedure Laterality Date    CYST REMOVAL      hand excision of tendon cyst       Family History   Problem Relation Age of Onset    Hypertension Mother     Breast cancer Mother 80    Stroke Mother     Heart attack Father     Diabetes Brother     Hepatitis Brother         type C viral    Kidney disease Brother         kidney transplant    Coronary artery disease Brother     No Known Problems Sister     No Known Problems Daughter     Ovarian cancer Paternal Grandmother         age unknown    No Known Problems Sister     No Known Problems Maternal Aunt     No Known Problems Maternal Aunt     No Known Problems Maternal Aunt     No Known Problems Paternal Aunt     No Known Problems Paternal Aunt     No Known Problems Paternal Aunt     No Known Problems Maternal Grandmother     No Known Problems Maternal Grandfather     No Known Problems Paternal Grandfather     Prostate cancer Brother          Medications have been verified  Objective   /66   Pulse 89   Temp 98 5 °F (36 9 °C)   Resp 18   Ht 5' 2" (1 575 m)   Wt 57 2 kg (126 lb)   SpO2 97%   BMI 23 05 kg/m²        Physical Exam     Physical Exam  Vitals and nursing note reviewed  Constitutional:       Appearance: Normal appearance  She is normal weight  HENT:      Head: Normocephalic and atraumatic  Right Ear: Tympanic membrane, ear canal and external ear normal       Left Ear: Tympanic membrane, ear canal and external ear normal       Nose: Congestion and rhinorrhea present  Comments: Boggy nasal mucosa  Max sinus tender to plap  Yellow nasal d/c   Eyes:      Extraocular Movements: Extraocular movements intact  Conjunctiva/sclera: Conjunctivae normal       Pupils: Pupils are equal, round, and reactive to light  Cardiovascular:      Rate and Rhythm: Normal rate and regular rhythm  Pulses: Normal pulses  Heart sounds: Normal heart sounds  Pulmonary:      Effort: Pulmonary effort is normal       Comments: Minor coarse luis e nds   Abdominal:      General: Abdomen is flat  Bowel sounds are normal       Palpations: Abdomen is soft  Musculoskeletal:         General: Normal range of motion  Cervical back: Normal range of motion and neck supple  Skin:     General: Skin is warm  Capillary Refill: Capillary refill takes less than 2 seconds  Neurological:      General: No focal deficit present  Mental Status: She is alert and oriented to person, place, and time     Psychiatric:         Mood and Affect: Mood normal          Behavior: Behavior normal

## 2022-06-20 LAB
FLUAV RNA RESP QL NAA+PROBE: NEGATIVE
FLUBV RNA RESP QL NAA+PROBE: NEGATIVE
SARS-COV-2 RNA RESP QL NAA+PROBE: NEGATIVE

## 2022-07-02 ENCOUNTER — OFFICE VISIT (OUTPATIENT)
Dept: URGENT CARE | Facility: CLINIC | Age: 67
End: 2022-07-02
Payer: COMMERCIAL

## 2022-07-02 VITALS
WEIGHT: 128 LBS | BODY MASS INDEX: 23.55 KG/M2 | DIASTOLIC BLOOD PRESSURE: 55 MMHG | HEIGHT: 62 IN | SYSTOLIC BLOOD PRESSURE: 116 MMHG | OXYGEN SATURATION: 99 % | TEMPERATURE: 98.6 F | RESPIRATION RATE: 18 BRPM | HEART RATE: 78 BPM

## 2022-07-02 DIAGNOSIS — J01.90 ACUTE NON-RECURRENT SINUSITIS, UNSPECIFIED LOCATION: Primary | ICD-10-CM

## 2022-07-02 PROCEDURE — 99213 OFFICE O/P EST LOW 20 MIN: CPT | Performed by: PHYSICIAN ASSISTANT

## 2022-07-02 RX ORDER — DOXYCYCLINE HYCLATE 100 MG/1
100 CAPSULE ORAL EVERY 12 HOURS SCHEDULED
Qty: 14 CAPSULE | Refills: 0 | Status: SHIPPED | OUTPATIENT
Start: 2022-07-02 | End: 2022-07-09

## 2022-07-02 NOTE — PROGRESS NOTES
[facility]        NAME: Sury Chavira is a 61 y o  female  : 1955    MRN: [id number]  DATE: 2022  TIME: 1:08 PM    Assessment and Plan   Acute non-recurrent sinusitis, unspecified location [J01 90]  1  Acute non-recurrent sinusitis, unspecified location  doxycycline hyclate (VIBRAMYCIN) 100 mg capsule         Patient Instructions     Patient has persistent sinusitis  She has been on Z-Ramón and Ceftin as well as a course of steroids without resolution of her condition  She has taken multi symptom prep so I do believe she needs better overall symptom management  Discussed Flonase, decongestants, Mucinex, saline sinus rinse  I will give her a one-week course of doxycycline  If her condition is still not resolved despite this, then the next step would be an ENT consult  Follow up with PCP in 3-5 days  Proceed to  ER if symptoms worsen  Chief Complaint     Chief Complaint   Patient presents with    Ear Fullness     BL ear fullness, feels like water is swooshing around, has had this for 6 days, feels like she is in a tunnel, was seen and given antibiotics/steroids on          History of Present Illness       Patient presents with persistent symptoms of bilateral ear pain and pressure/fullness, sinus congestion with thick mucus  She has been seen twice previously and was 1st given Ceftin and then a Z-Ramón and an 8 day oral prednisone taper  She has completed all this but the symptoms still have not resolved  She is taking Tylenol cold and flu  Review of Systems   Review of Systems   Constitutional: Negative  HENT: Positive for congestion, ear pain (Pressure and fullness bilaterally), sinus pressure and sinus pain  Respiratory: Negative  Cardiovascular: Negative  Gastrointestinal: Negative  Genitourinary: Negative            Current Medications       Current Outpatient Medications:     Cholecalciferol (Vitamin D3 Ultra Strength) 125 MCG (5000 UT) capsule, Take 5,000 Units by mouth daily, Disp: , Rfl:     cycloSPORINE (RESTASIS) 0 05 % ophthalmic emulsion, Apply 1 drop to eye every 12 (twelve) hours, Disp: , Rfl:     doxycycline hyclate (VIBRAMYCIN) 100 mg capsule, Take 1 capsule (100 mg total) by mouth every 12 (twelve) hours for 7 days, Disp: 14 capsule, Rfl: 0    Multiple Vitamin (MULTIVITAMINS PO), Take 1 tablet by mouth daily, Disp: , Rfl:     predniSONE 10 mg tablet, 4 tabs po qd x 2 days then 3 tabs po qd x 2 days then 2 tabs po qd x 2 days then 1 tab po qd x 2 days, Disp: 20 tablet, Rfl: 0    Probiotic Product (PROBIOTIC ADVANCED PO), Take 1 tablet by mouth daily, Disp: , Rfl:     benzonatate (TESSALON PERLES) 100 mg capsule, Take 1 capsule (100 mg total) by mouth 3 (three) times a day as needed for cough (Patient not taking: Reported on 7/2/2022), Disp: 20 capsule, Rfl: 0    valACYclovir (VALTREX) 500 mg tablet, Take 1 tablet (500 mg total) by mouth daily for 90 days 1 tab PO q 12 hrs x 3 days as needed, Disp: 90 tablet, Rfl: 2    Current Allergies     Allergies as of 07/02/2022 - Reviewed 07/02/2022   Allergen Reaction Noted    Fish-derived products - food allergy  08/05/2015    Iodine - food allergy Abdominal Pain and Hives 08/02/2016            The following portions of the patient's history were reviewed and updated as appropriate: allergies, current medications, past family history, past medical history, past social history, past surgical history and problem list      Past Medical History:   Diagnosis Date    Thyroid nodule        Past Surgical History:   Procedure Laterality Date    CYST REMOVAL      hand excision of tendon cyst       Family History   Problem Relation Age of Onset    Hypertension Mother     Breast cancer Mother 80    Stroke Mother     Heart attack Father     Diabetes Brother     Hepatitis Brother         type C viral    Kidney disease Brother         kidney transplant    Coronary artery disease Brother     No Known Problems Sister     No Known Problems Daughter     Ovarian cancer Paternal Grandmother         age unknown    No Known Problems Sister     No Known Problems Maternal Aunt     No Known Problems Maternal Aunt     No Known Problems Maternal Aunt     No Known Problems Paternal Aunt     No Known Problems Paternal Aunt     No Known Problems Paternal Aunt     No Known Problems Maternal Grandmother     No Known Problems Maternal Grandfather     No Known Problems Paternal Grandfather     Prostate cancer Brother          Medications have been verified  Objective   /55   Pulse 78   Temp 98 6 °F (37 °C)   Resp 18   Ht 5' 2" (1 575 m)   Wt 58 1 kg (128 lb)   SpO2 99%   BMI 23 41 kg/m²   No LMP recorded  Patient is postmenopausal        Physical Exam     Physical Exam  Vitals reviewed  Constitutional:       General: She is not in acute distress  Appearance: She is well-developed  HENT:      Right Ear: Hearing, ear canal and external ear normal       Left Ear: Hearing, ear canal and external ear normal       Ears:      Comments: Bilateral dull TMs     Nose: Mucosal edema (Bilateral boggy turbinates) and congestion present  Mouth/Throat:      Mouth: Mucous membranes are moist       Pharynx: Oropharynx is clear  Cardiovascular:      Rate and Rhythm: Normal rate and regular rhythm  Pulses: Normal pulses  Heart sounds: Normal heart sounds  No murmur heard  Pulmonary:      Effort: Pulmonary effort is normal  No respiratory distress  Breath sounds: Normal breath sounds  Musculoskeletal:      Cervical back: Neck supple  Lymphadenopathy:      Cervical: No cervical adenopathy  Neurological:      Mental Status: She is alert and oriented to person, place, and time

## 2022-07-02 NOTE — PATIENT INSTRUCTIONS
Sinusitis   WHAT YOU NEED TO KNOW:   Sinusitis is inflammation or infection of your sinuses  Sinusitis is most often caused by a virus  Acute sinusitis may last up to 12 weeks  Chronic sinusitis lasts longer than 12 weeks  Recurrent sinusitis means you have 4 or more infections in 1 year  DISCHARGE INSTRUCTIONS:   Return to the emergency department if:   You have trouble breathing or wheezing that is getting worse  You have a stiff neck, a fever, or a bad headache  You cannot open your eye  Your eyeball bulges out or you cannot move your eye  You are more sleepy than normal, or you notice changes in your ability to think, move, or talk  You have swelling of your forehead or scalp  Call your doctor if:   You have vision changes, such as double vision  Your eye and eyelid are red, swollen, and painful  Your symptoms do not improve or go away after 10 days  You have nausea and are vomiting  Your nose is bleeding  You have questions or concerns about your condition or care  Medicines: Your symptoms may go away on their own  Your healthcare provider may recommend watchful waiting for up to 10 days before starting antibiotics  You may need any of the following:  Acetaminophen  decreases pain and fever  It is available without a doctor's order  Ask how much to take and how often to take it  Follow directions  Read the labels of all other medicines you are using to see if they also contain acetaminophen, or ask your doctor or pharmacist  Acetaminophen can cause liver damage if not taken correctly  Do not use more than 4 grams (4,000 milligrams) total of acetaminophen in one day  NSAIDs , such as ibuprofen, help decrease swelling, pain, and fever  This medicine is available with or without a doctor's order  NSAIDs can cause stomach bleeding or kidney problems in certain people   If you take blood thinner medicine, always ask your healthcare provider if NSAIDs are safe for you  Always read the medicine label and follow directions  Nasal steroid sprays  may help decrease inflammation in your nose and sinuses  Decongestants  help reduce swelling and drain mucus in the nose and sinuses  They may help you breathe easier  Antihistamines  help dry mucus in the nose and relieve sneezing  Antibiotics  help treat or prevent a bacterial infection  Take your medicine as directed  Contact your healthcare provider if you think your medicine is not helping or if you have side effects  Tell him or her if you are allergic to any medicine  Keep a list of the medicines, vitamins, and herbs you take  Include the amounts, and when and why you take them  Bring the list or the pill bottles to follow-up visits  Carry your medicine list with you in case of an emergency  Self-care:   Rinse your sinuses as directed  Use a sinus rinse device to rinse your nasal passages with a saline (salt water) solution or distilled water  Do not use tap water  This will help thin the mucus in your nose and rinse away pollen and dirt  It will also help reduce swelling so you can breathe normally  Use a humidifier  to increase air moisture in your home  This may make it easier for you to breathe and help decrease your cough  Sleep with your head elevated  Place an extra pillow under your head before you go to sleep to help your sinuses drain  Drink liquids as directed  Ask your healthcare provider how much liquid to drink each day and which liquids are best for you  Liquids will thin the mucus in your nose and help it drain  Avoid drinks that contain alcohol or caffeine  Do not smoke, and avoid secondhand smoke  Nicotine and other chemicals in cigarettes and cigars can make your symptoms worse  Ask your healthcare provider for information if you currently smoke and need help to quit  E-cigarettes or smokeless tobacco still contain nicotine   Talk to your healthcare provider before you use these products  Prevent the spread of germs:   Wash your hands often with soap and water  Wash your hands after you use the bathroom, change a child's diaper, or sneeze  Wash your hands before you prepare or eat food  Stay away from people who are sick  Some germs spread easily and quickly through contact  Follow up with your doctor as directed: You may be referred to an ear, nose, and throat specialist  Write down your questions so you remember to ask them during your visits  © Copyright [address] 2022 Information is for End User's use only and may not be sold, redistributed or otherwise used for commercial purposes  All illustrations and images included in CareNotes® are the copyrighted property of [employer]  or [facility] [employer]ise [employer]   The above information is an  only  It is not intended as medical advice for individual conditions or treatments  Talk to your doctor, nurse or pharmacist before following any medical regimen to see if it is safe and effective for you

## 2022-07-08 ENCOUNTER — TELEPHONE (OUTPATIENT)
Dept: FAMILY MEDICINE CLINIC | Facility: HOSPITAL | Age: 67
End: 2022-07-08

## 2022-07-08 NOTE — TELEPHONE ENCOUNTER
Patient thinks she has a UTI  She has urge to urinate but has trouble going  And when she does there is a burning sensation  Also has a little pain in her belly  [facility] [place] [facility]

## 2022-07-13 ENCOUNTER — TELEPHONE (OUTPATIENT)
Dept: FAMILY MEDICINE CLINIC | Facility: HOSPITAL | Age: 67
End: 2022-07-13

## 2022-07-13 DIAGNOSIS — H91.90 HEARING LOSS, UNSPECIFIED HEARING LOSS TYPE, UNSPECIFIED LATERALITY: Primary | ICD-10-CM

## 2022-07-13 NOTE — TELEPHONE ENCOUNTER
KEN IS A PATIENT OF DR Annabella Bell - HAS BEEN ON 3 BOUTS OF ANTIBIOTICS (ENDED UP GOING TO THE URGENT CARE TWICE) THE COUGH HAS SUBSIDED BUT NOW SHE CAN'T HEAR OUT OF HER EAR - SHE IS GETTING SCARED , FEELS LIKE SHE HAS WATER IN HER EAR AND EVERY ONCE IN AWHILE IT FEELS [address]    STILL FEELS LIKE SHE IS CONGESTED BUT SHE IS NOT BLOWING HER NOSE - SHE CAN'T HEAR [place] OR ANYONE WHO HAS A SOFT VOICE - ASKING FOR A ENT REFERRAL ([facility] REFERRAL)

## 2022-07-13 NOTE — TELEPHONE ENCOUNTER
Referral for ENT placed upon request   Sounds like inner ear dysfunction/eustachian tube dysfunction  Pt can be seen at our office for eval - sometimes oral steroid and/or nasal steroid and antihistamine can be of benefit

## 2022-07-18 ENCOUNTER — OFFICE VISIT (OUTPATIENT)
Dept: FAMILY MEDICINE CLINIC | Facility: HOSPITAL | Age: 67
End: 2022-07-18
Payer: COMMERCIAL

## 2022-07-18 VITALS
BODY MASS INDEX: 23.77 KG/M2 | HEIGHT: 62 IN | WEIGHT: 129.2 LBS | HEART RATE: 70 BPM | DIASTOLIC BLOOD PRESSURE: 62 MMHG | SYSTOLIC BLOOD PRESSURE: 116 MMHG | TEMPERATURE: 97.2 F

## 2022-07-18 DIAGNOSIS — Z13.0 SCREENING FOR ENDOCRINE, NUTRITIONAL, METABOLIC AND IMMUNITY DISORDER: ICD-10-CM

## 2022-07-18 DIAGNOSIS — Z13.228 SCREENING FOR ENDOCRINE, NUTRITIONAL, METABOLIC AND IMMUNITY DISORDER: ICD-10-CM

## 2022-07-18 DIAGNOSIS — Z13.21 SCREENING FOR ENDOCRINE, NUTRITIONAL, METABOLIC AND IMMUNITY DISORDER: ICD-10-CM

## 2022-07-18 DIAGNOSIS — H69.83 EUSTACHIAN TUBE DYSFUNCTION, BILATERAL: Primary | ICD-10-CM

## 2022-07-18 DIAGNOSIS — Z13.29 SCREENING FOR ENDOCRINE, NUTRITIONAL, METABOLIC AND IMMUNITY DISORDER: ICD-10-CM

## 2022-07-18 DIAGNOSIS — E04.1 THYROID NODULE: ICD-10-CM

## 2022-07-18 DIAGNOSIS — Z13.220 SCREENING FOR CHOLESTEROL LEVEL: ICD-10-CM

## 2022-07-18 PROBLEM — K63.5 POLYP OF COLON: Status: ACTIVE | Noted: 2022-07-18

## 2022-07-18 PROBLEM — K64.9 HEMORRHOIDS: Status: ACTIVE | Noted: 2022-07-18

## 2022-07-18 PROCEDURE — 99214 OFFICE O/P EST MOD 30 MIN: CPT | Performed by: INTERNAL MEDICINE

## 2022-07-18 RX ORDER — FLUTICASONE PROPIONATE 50 MCG
2 SPRAY, SUSPENSION (ML) NASAL DAILY
Qty: 16 G | Refills: 1 | Status: SHIPPED | OUTPATIENT
Start: 2022-07-18

## 2022-07-18 RX ORDER — PREDNISONE 10 MG/1
TABLET ORAL
Qty: 25 TABLET | Refills: 0 | Status: SHIPPED | OUTPATIENT
Start: 2022-07-18

## 2022-07-18 NOTE — PROGRESS NOTES
Assessment/Plan:       Diagnoses and all orders for this visit:    Eustachian tube dysfunction, bilateral  Comments:  Reassured pt that no sign of current infection and NO further abx needed, s/sx and exam c/w ETD - will do one more dose of Prednisone (SE reviewed) and Flonase and con't Sudafed, if no better in 2-3 wks will have to see ENT, call with new/worse symptoms in the interm  Orders:  -     predniSONE 10 mg tablet; 3 tab PO x 1 today then 2 tab PO bid x 3 days then 1 tab PO bid x 3 days then 1 tab PO q day x 3 days then stop  -     fluticasone (FLONASE) 50 mcg/act nasal spray; 2 sprays into each nostril daily  -     CBC and differential; Future  -     Comprehensive metabolic panel; Future  -     Lipid panel; Future  -     TSH, 3rd generation with Free T4 reflex; Future    Thyroid nodule  Comments:  Check TSH with labs in Nov - order given  Orders:  -     CBC and differential; Future  -     Comprehensive metabolic panel; Future  -     Lipid panel; Future  -     TSH, 3rd generation with Free T4 reflex; Future    Screening for cholesterol level  -     Lipid panel; Future    Screening for endocrine, nutritional, metabolic and immunity disorder  -     CBC and differential; Future  -     Comprehensive metabolic panel; Future  -     Lipid panel; Future  -     TSH, 3rd generation with Free T4 reflex; Future      Colonoscopy 10/19- 5 yrs    Mammo 3/22    PAP 1/19 -5 yrs    BW 11/21 - order given for Nov        Subjective:      Patient ID: Mauri Stinson is a 61 y o  female  HPI Pt here for an acute visit    Pt here for ongoing ear symptoms  She was seen 6/15/22 and tx for sinusitis with Cefuroxime  Symptoms did not improve and she saw  and was tx with Zpak and Prednisone 6/19/22  She then was given a 3rd round of abx (Doxy) 7/2/22 for persistent ear symptoms  Today she con't to feel like "I'm in a tunnel and have a lot of fluids"  She notes decrease in hearing to high pitched voices and whispers   She feels like popping is going on in her ears  Currently she does not feel ill  She notes no F/C/congestion/runny nose/sinus pain but has some pressure  She denies ST/N/V/D/urinary symptoms/rashes  She notes no HA's/dizziness  She denies ringing in the ear  Currently she is using Sudafed w/o great benefit  Review of Systems   Constitutional: Negative for chills and fever  HENT: Positive for hearing loss  Negative for congestion, ear discharge, ear pain and sore throat  Respiratory: Negative for cough and shortness of breath  Gastrointestinal: Negative for diarrhea, nausea and vomiting  Genitourinary: Negative for difficulty urinating and dysuria  Musculoskeletal: Negative for back pain and neck pain  Skin: Negative for rash and wound  Neurological: Negative for dizziness, light-headedness and headaches  Hematological: Negative for adenopathy  Objective:    /62   Pulse 70   Temp (!) 97 2 °F (36 2 °C) (Tympanic)   Ht 5' 2" (1 575 m)   Wt 58 6 kg (129 lb 3 2 oz)   BMI 23 63 kg/m²      Physical Exam  Vitals and nursing note reviewed  Constitutional:       General: She is not in acute distress  Appearance: She is well-developed  HENT:      Head: Normocephalic and atraumatic  Right Ear: Tympanic membrane and external ear normal  There is no impacted cerumen  Left Ear: External ear normal  There is no impacted cerumen  Ears:      Comments: L TM with effusion  Eyes:      General:         Right eye: No discharge  Left eye: No discharge  Conjunctiva/sclera: Conjunctivae normal    Neck:      Trachea: No tracheal deviation  Cardiovascular:      Rate and Rhythm: Normal rate and regular rhythm  Heart sounds: Normal heart sounds  No murmur heard  No friction rub  Pulmonary:      Effort: Pulmonary effort is normal  No respiratory distress  Breath sounds: Normal breath sounds  No wheezing, rhonchi or rales     Musculoskeletal: Cervical back: Neck supple  Lymphadenopathy:      Cervical: No cervical adenopathy  Skin:     General: Skin is warm  Coloration: Skin is not pale  Findings: No rash  Neurological:      General: No focal deficit present  Mental Status: She is alert  Motor: No abnormal muscle tone  Gait: Gait normal    Psychiatric:         Mood and Affect: Mood normal          Behavior: Behavior normal          Thought Content:  Thought content normal          Judgment: Judgment normal

## 2022-10-11 ENCOUNTER — ANNUAL EXAM (OUTPATIENT)
Dept: OBGYN CLINIC | Facility: CLINIC | Age: 67
End: 2022-10-11
Payer: COMMERCIAL

## 2022-10-11 VITALS
WEIGHT: 130 LBS | BODY MASS INDEX: 23.92 KG/M2 | DIASTOLIC BLOOD PRESSURE: 68 MMHG | HEIGHT: 62 IN | SYSTOLIC BLOOD PRESSURE: 118 MMHG

## 2022-10-11 DIAGNOSIS — G47.00 INSOMNIA, UNSPECIFIED TYPE: Primary | ICD-10-CM

## 2022-10-11 DIAGNOSIS — Z01.419 WOMEN'S ANNUAL ROUTINE GYNECOLOGICAL EXAMINATION: ICD-10-CM

## 2022-10-11 DIAGNOSIS — Z12.31 ENCOUNTER FOR SCREENING MAMMOGRAM FOR MALIGNANT NEOPLASM OF BREAST: ICD-10-CM

## 2022-10-11 PROCEDURE — G0145 SCR C/V CYTO,THINLAYER,RESCR: HCPCS | Performed by: OBSTETRICS & GYNECOLOGY

## 2022-10-11 PROCEDURE — S0612 ANNUAL GYNECOLOGICAL EXAMINA: HCPCS | Performed by: OBSTETRICS & GYNECOLOGY

## 2022-10-11 PROCEDURE — G0476 HPV COMBO ASSAY CA SCREEN: HCPCS | Performed by: OBSTETRICS & GYNECOLOGY

## 2022-10-11 RX ORDER — ZOLPIDEM TARTRATE 5 MG/1
5 TABLET ORAL
Qty: 6 TABLET | Refills: 1 | Status: SHIPPED | OUTPATIENT
Start: 2022-10-11

## 2022-10-11 NOTE — PROGRESS NOTES
Assessment/Plan:    The patient was informed of a stable menopausal gyn examination  A Pap smear was performed  She feels safe at home  She is not in a relationship at the present time  She will be retiring next year  There are no new major family illnesses report  Denies any problem depression or anxiety  She will continue seeing a dentist in family doctor  She should return my office in 1 year unless new issues occur  Subjective:      Patient ID: Preet Loo is a 74 y o  female  HPI    This is a 68-year-old white female, she is a  1 para 1  She is menopausal   Her not sexually active  Denies any symptoms of menopause  Like to lose more weight  She has evaluate for recent sinusitis she is now doing better  Colonoscopies and mammograms up-to-date  She feels safe at home  Denies any problem depression or anxiety  Denies any major  GI complaint  She has a dentist on a regular basis  She has plans on retiring in September of next year  The following portions of the patient's history were reviewed and updated as appropriate: allergies, current medications, past family history, past medical history, past social history, past surgical history and problem list     Review of Systems   HENT: Negative for rhinorrhea  All other systems reviewed and are negative  Objective:      /68   Ht 5' 2" (1 575 m)   Wt 59 kg (130 lb)   BMI 23 78 kg/m²          Physical Exam  Vitals reviewed  Exam conducted with a chaperone present  Constitutional:       Appearance: Normal appearance  She is normal weight  HENT:      Head: Normocephalic and atraumatic  Mouth/Throat:      Mouth: Mucous membranes are moist    Eyes:      Pupils: Pupils are equal, round, and reactive to light  Cardiovascular:      Rate and Rhythm: Normal rate and regular rhythm  Pulses: Normal pulses  Heart sounds: Normal heart sounds     Pulmonary:      Effort: Pulmonary effort is normal       Breath sounds: Normal breath sounds  Chest:   Breasts: Breasts are symmetrical       Right: Normal  No swelling, bleeding, inverted nipple, mass, nipple discharge, skin change, tenderness, axillary adenopathy or supraclavicular adenopathy  Left: Normal  No swelling, bleeding, inverted nipple, mass, nipple discharge, skin change, tenderness, axillary adenopathy or supraclavicular adenopathy  Abdominal:      General: Abdomen is flat  Bowel sounds are normal  There is no distension  Palpations: Abdomen is soft  There is no hepatomegaly, splenomegaly or mass  Tenderness: There is no abdominal tenderness  There is no right CVA tenderness, left CVA tenderness, guarding or rebound  Hernia: No hernia is present  There is no hernia in the left inguinal area or right inguinal area  Genitourinary:     General: Normal vulva  Pubic Area: No rash or pubic lice  Labia:         Right: No rash, tenderness, lesion or injury  Left: No rash, tenderness, lesion or injury  Urethra: No prolapse, urethral pain, urethral swelling or urethral lesion  Vagina: Normal  No signs of injury and foreign body  No vaginal discharge, erythema, tenderness or bleeding  Cervix: Normal       Uterus: Normal  Not deviated, not enlarged, not fixed, not tender and no uterine prolapse  Adnexa: Right adnexa normal and left adnexa normal         Right: No mass, tenderness or fullness  Left: No mass, tenderness or fullness  Rectum: Normal       Comments: The external genitalia normal limits the vagina is clean the cervix is parous but closed uterus is anterior  The vagina is consistent with menopause  A Pap smear was performed  The adnexa clear bilaterally  There is no evidence of prolapse  Musculoskeletal:         General: Normal range of motion  Cervical back: Normal range of motion and neck supple     Lymphadenopathy:      Upper Body:      Right upper body: No supraclavicular or axillary adenopathy  Left upper body: No supraclavicular or axillary adenopathy  Skin:     General: Skin is warm and dry  Neurological:      General: No focal deficit present  Mental Status: She is alert and oriented to person, place, and time     Psychiatric:         Mood and Affect: Mood normal

## 2022-10-11 NOTE — PATIENT INSTRUCTIONS
The patient was informed of a stable menopausal gyn examination  A Pap smear was performed  She is happy with her weight  She feels safe at home  There is no problem depression or anxiety  There are no new major family illnesses report  She will continue getting her mammograms  She will clear colonoscopies as directed  Return my office in 1 year unless new issues or problems occur

## 2022-10-15 LAB
LAB AP GYN PRIMARY INTERPRETATION: NORMAL
Lab: NORMAL

## 2022-11-17 ENCOUNTER — RA CDI HCC (OUTPATIENT)
Dept: OTHER | Facility: HOSPITAL | Age: 67
End: 2022-11-17

## 2022-11-21 ENCOUNTER — APPOINTMENT (OUTPATIENT)
Dept: LAB | Facility: HOSPITAL | Age: 67
End: 2022-11-21

## 2022-11-21 DIAGNOSIS — Z13.0 SCREENING FOR ENDOCRINE, NUTRITIONAL, METABOLIC AND IMMUNITY DISORDER: ICD-10-CM

## 2022-11-21 DIAGNOSIS — Z13.228 SCREENING FOR ENDOCRINE, NUTRITIONAL, METABOLIC AND IMMUNITY DISORDER: ICD-10-CM

## 2022-11-21 DIAGNOSIS — Z13.220 SCREENING FOR CHOLESTEROL LEVEL: ICD-10-CM

## 2022-11-21 DIAGNOSIS — E04.1 THYROID NODULE: ICD-10-CM

## 2022-11-21 DIAGNOSIS — Z13.29 SCREENING FOR ENDOCRINE, NUTRITIONAL, METABOLIC AND IMMUNITY DISORDER: ICD-10-CM

## 2022-11-21 DIAGNOSIS — H69.83 EUSTACHIAN TUBE DYSFUNCTION, BILATERAL: ICD-10-CM

## 2022-11-21 DIAGNOSIS — Z13.21 SCREENING FOR ENDOCRINE, NUTRITIONAL, METABOLIC AND IMMUNITY DISORDER: ICD-10-CM

## 2022-11-21 LAB
ALBUMIN SERPL BCP-MCNC: 3.7 G/DL (ref 3.5–5)
ALP SERPL-CCNC: 99 U/L (ref 46–116)
ALT SERPL W P-5'-P-CCNC: 26 U/L (ref 12–78)
ANION GAP SERPL CALCULATED.3IONS-SCNC: 8 MMOL/L (ref 4–13)
AST SERPL W P-5'-P-CCNC: 21 U/L (ref 5–45)
BASOPHILS # BLD AUTO: 0.05 THOUSANDS/ÂΜL (ref 0–0.1)
BASOPHILS NFR BLD AUTO: 1 % (ref 0–1)
BILIRUB SERPL-MCNC: 1.7 MG/DL (ref 0.2–1)
BUN SERPL-MCNC: 17 MG/DL (ref 5–25)
CALCIUM SERPL-MCNC: 9.3 MG/DL (ref 8.3–10.1)
CHLORIDE SERPL-SCNC: 107 MMOL/L (ref 96–108)
CHOLEST SERPL-MCNC: 174 MG/DL
CO2 SERPL-SCNC: 24 MMOL/L (ref 21–32)
CREAT SERPL-MCNC: 0.68 MG/DL (ref 0.6–1.3)
EOSINOPHIL # BLD AUTO: 0.28 THOUSAND/ÂΜL (ref 0–0.61)
EOSINOPHIL NFR BLD AUTO: 5 % (ref 0–6)
ERYTHROCYTE [DISTWIDTH] IN BLOOD BY AUTOMATED COUNT: 12.7 % (ref 11.6–15.1)
GFR SERPL CREATININE-BSD FRML MDRD: 92 ML/MIN/1.73SQ M
GLUCOSE P FAST SERPL-MCNC: 93 MG/DL (ref 65–99)
HCT VFR BLD AUTO: 42.7 % (ref 34.8–46.1)
HDLC SERPL-MCNC: 57 MG/DL
HGB BLD-MCNC: 13.4 G/DL (ref 11.5–15.4)
IMM GRANULOCYTES # BLD AUTO: 0.02 THOUSAND/UL (ref 0–0.2)
IMM GRANULOCYTES NFR BLD AUTO: 0 % (ref 0–2)
LDLC SERPL CALC-MCNC: 83 MG/DL (ref 0–100)
LYMPHOCYTES # BLD AUTO: 1.59 THOUSANDS/ÂΜL (ref 0.6–4.47)
LYMPHOCYTES NFR BLD AUTO: 30 % (ref 14–44)
MCH RBC QN AUTO: 29.8 PG (ref 26.8–34.3)
MCHC RBC AUTO-ENTMCNC: 31.4 G/DL (ref 31.4–37.4)
MCV RBC AUTO: 95 FL (ref 82–98)
MONOCYTES # BLD AUTO: 0.58 THOUSAND/ÂΜL (ref 0.17–1.22)
MONOCYTES NFR BLD AUTO: 11 % (ref 4–12)
NEUTROPHILS # BLD AUTO: 2.76 THOUSANDS/ÂΜL (ref 1.85–7.62)
NEUTS SEG NFR BLD AUTO: 53 % (ref 43–75)
NONHDLC SERPL-MCNC: 117 MG/DL
NRBC BLD AUTO-RTO: 0 /100 WBCS
PLATELET # BLD AUTO: 272 THOUSANDS/UL (ref 149–390)
PMV BLD AUTO: 9.8 FL (ref 8.9–12.7)
POTASSIUM SERPL-SCNC: 4.4 MMOL/L (ref 3.5–5.3)
PROT SERPL-MCNC: 7.2 G/DL (ref 6.4–8.4)
RBC # BLD AUTO: 4.5 MILLION/UL (ref 3.81–5.12)
SODIUM SERPL-SCNC: 139 MMOL/L (ref 135–147)
TRIGL SERPL-MCNC: 170 MG/DL
TSH SERPL DL<=0.05 MIU/L-ACNC: 0.92 UIU/ML (ref 0.45–4.5)
WBC # BLD AUTO: 5.28 THOUSAND/UL (ref 4.31–10.16)

## 2022-11-25 ENCOUNTER — OFFICE VISIT (OUTPATIENT)
Dept: FAMILY MEDICINE CLINIC | Facility: HOSPITAL | Age: 67
End: 2022-11-25

## 2022-11-25 VITALS
TEMPERATURE: 98.1 F | HEIGHT: 62 IN | WEIGHT: 132.4 LBS | BODY MASS INDEX: 24.37 KG/M2 | DIASTOLIC BLOOD PRESSURE: 78 MMHG | HEART RATE: 75 BPM | SYSTOLIC BLOOD PRESSURE: 122 MMHG

## 2022-11-25 DIAGNOSIS — Z00.00 ANNUAL PHYSICAL EXAM: Primary | ICD-10-CM

## 2022-11-25 DIAGNOSIS — M79.672 FOOT PAIN, BILATERAL: ICD-10-CM

## 2022-11-25 DIAGNOSIS — M79.671 FOOT PAIN, BILATERAL: ICD-10-CM

## 2022-11-25 DIAGNOSIS — E28.39 MENOPAUSE OVARIAN FAILURE: ICD-10-CM

## 2022-11-25 DIAGNOSIS — Z23 ENCOUNTER FOR IMMUNIZATION: ICD-10-CM

## 2022-11-25 NOTE — PROGRESS NOTES
[facility] [address]     NAME: Luis Sanchez  AGE: 74 y o  SEX: female  : 1955     DATE: 2022     Assessment and Plan:     Problem List Items Addressed This Visit    None  Visit Diagnoses     Annual physical exam    -  Primary    Menopause ovarian failure        Dexa ordered    Relevant Orders    DXA bone density spine hip and pelvis    Foot pain, bilateral        Sounds c/w plantar fasciitis but is at achilles region as well - will refer to podiatry d/t duration of symptoms    Relevant Orders    Ambulatory Referral to Podiatry    Encounter for immunization        Relevant Orders    Pneumococcal Conjugate Vaccine 20-valent (Pcv20) (Completed)          Immunizations and preventive care screenings were discussed with patient today  Appropriate education was printed on patient's after visit summary  Counseling:  Alcohol/drug use: discussed moderation in alcohol intake, the recommendations for healthy alcohol use, and avoidance of illicit drug use  Dental Health: discussed importance of regular tooth brushing, flossing, and dental visits  Injury prevention: discussed safety/seat belts, safety helmets, smoke detectors, carbon dioxide detectors, and smoking near bedding or upholstery  · Exercise: the importance of regular exercise/physical activity was discussed  Recommend exercise 3-5 times per week for at least 30 minutes  · Cervical cancer screening: PAP 10/22  · Breast cancer screening: Mammo 3/22  · Colon cancer screening: Colonoscopy 10/19 -  yrs  · Osteoporosis screening: Dexa ordered today      Depression Screening and Follow-up Plan: Patient was screened for depression during today's encounter  They screened negative with a PHQ-2 score of 0      Pt agreeable to Prevnar 20     Return in about 1 year (around 2023) for Annual physical      Chief Complaint:     Chief Complaint   Patient presents with   • Annual Exam      History of Present Illness:     Adult Annual Physical   Patient here for a comprehensive physical exam  The patient reports problems - noting years of B/L foot pain  Pain at back of heel at achilles region  She notes the pain has increased in severity the past few mos  She has researched and feels she may have plantar fasciitis  She walks a lot and has noted more pain with walking  She notes a good supportive shoe does help and the pain is better once she get up and walks  Pain worse when she first starts walking  She has been doing stretches for planta fasciitis  She notes no numbness/tingling/redness/warmth  She feels it is swollen intermittently  BW results were reviewed with pt in detail: T [place] 1 70 but rest of CMP/CBC/TSH was wnl, FLP with  but TC/LDL/HDL were all at goal     Diet and Physical Activity  · Diet/Nutrition: poor diet and limited fruits/vegetables  · Exercise: walking and goes to the gym for some weight training sporadically  · BMI reviewed     Depression Screening  PHQ-2/9 Depression Screening    Little interest or pleasure in doing things: 0 - not at all  Feeling down, depressed, or hopeless: 0 - not at all  PHQ-2 Score: 0  PHQ-2 Interpretation: Negative depression screen       General Health  · Sleep: sleeps poorly and has issues intermittently falling asleep and staying asleep  · Hearing: decreased - bilateral and issues are mainly when in crowds  · Vision: goes for regular eye exams, most recent eye exam <1 year ago and wears glasses  · Dental: regular dental visits, brushes teeth twice daily and flosses regularly  /GYN Health  · Patient is: postmenopausal  · Last menstrual period: at age 52  · Contraceptive method: postmenopausal      Review of Systems:     Review of Systems   Constitutional: Negative for chills, fever and unexpected weight change  HENT: Positive for hearing loss  Negative for congestion and trouble swallowing  Eyes: Negative for pain and visual disturbance  Respiratory: Negative for cough, shortness of breath and wheezing  Cardiovascular: Negative for chest pain, palpitations and leg swelling  Gastrointestinal: Negative for abdominal pain, blood in stool, constipation, diarrhea, nausea and vomiting  Endocrine: Negative for polydipsia and polyuria  Genitourinary: Negative for difficulty urinating and dysuria  Musculoskeletal: Positive for arthralgias  Negative for back pain and neck pain  Skin: Negative for rash and wound  Neurological: Negative for dizziness, light-headedness and headaches  Hematological: Does not bruise/bleed easily  Psychiatric/Behavioral: Positive for sleep disturbance  Negative for confusion and dysphoric mood  Past Medical History:     Past Medical History:   Diagnosis Date   • Thyroid nodule       Past Surgical History:     Past Surgical History:   Procedure Laterality Date   • CYST REMOVAL      hand excision of tendon cyst      Social History:     Social History     Socioeconomic History   • Marital status:       Spouse name: None   • Number of children: None   • Years of education: None   • Highest education level: None   Occupational History     Employer: [employer]     Comment: Retired   Tobacco Use   • Smoking status: Never   • Smokeless tobacco: Never   Vaping Use   • Vaping Use: Never used   Substance and Sexual Activity   • Alcohol use: Yes     Comment: occasional - social on weekends   • Drug use: No   • Sexual activity: Not Currently   Other Topics Concern   • None   Social History Narrative    , lives alone, working full time     Social Determinants of Health     Financial Resource Strain: Not on file   Food Insecurity: Not on file   Transportation Needs: Not on file   Physical Activity: Not on file   Stress: Not on file   Social Connections: Not on file   Intimate Partner Violence: Not on file   Housing Stability: Not on file Family History:     Family History   Problem Relation Age of Onset   • Hypertension Mother    • Breast cancer Mother 80   • Stroke Mother    • Heart attack Father    • Diabetes Brother    • Hepatitis Brother         type C viral   • Kidney disease Brother         kidney transplant   • Coronary artery disease Brother    • No Known Problems Sister    • No Known Problems Daughter    • Ovarian cancer Paternal Grandmother         age unknown   • No Known Problems Sister    • No Known Problems Maternal Aunt    • No Known Problems Maternal Aunt    • No Known Problems Maternal Aunt    • No Known Problems Paternal Aunt    • No Known Problems Paternal Aunt    • No Known Problems Paternal Aunt    • No Known Problems Maternal Grandmother    • No Known Problems Maternal Grandfather    • No Known Problems Paternal Grandfather    • Prostate cancer Brother       Current Medications:     Current Outpatient Medications   Medication Sig Dispense Refill   • Cholecalciferol (Vitamin D3 Ultra Strength) 125 MCG (5000 UT) capsule Take 5,000 Units by mouth daily     • cycloSPORINE (RESTASIS) 0 05 % ophthalmic emulsion Apply 1 drop to eye every 12 (twelve) hours     • Multiple Vitamin (MULTIVITAMINS PO) Take 1 tablet by mouth daily     • Probiotic Product (PROBIOTIC ADVANCED PO) Take 1 tablet by mouth daily     • valACYclovir (VALTREX) 500 mg tablet Take 1 tablet (500 mg total) by mouth daily for 90 days 1 tab PO q 12 hrs x 3 days as needed 90 tablet 2     No current facility-administered medications for this visit  Allergies: Allergies   Allergen Reactions   • Fish-Derived Products - Food Allergy      Other reaction(s): flounder and rajni give her hives   • Iodine - Food Allergy Abdominal Pain and Hives      Physical Exam:     /78   Pulse 75   Temp 98 1 °F (36 7 °C)   Ht 5' 2" (1 575 m)   Wt 60 1 kg (132 lb 6 4 oz)   BMI 24 22 kg/m²     Physical Exam  Vitals and nursing note reviewed     Constitutional:       General: She is not in acute distress  Appearance: She is well-developed and well-nourished  She is not ill-appearing  HENT:      Head: Normocephalic and atraumatic  Right Ear: Tympanic membrane and external ear normal       Left Ear: Tympanic membrane and external ear normal       Mouth/Throat:      Mouth: Oropharynx is clear and moist    Eyes:      General:         Right eye: No discharge  Left eye: No discharge  Conjunctiva/sclera: Conjunctivae normal    Neck:      Thyroid: No thyromegaly  Vascular: No carotid bruit  Trachea: No tracheal deviation  Cardiovascular:      Rate and Rhythm: Normal rate and regular rhythm  Heart sounds: Normal heart sounds  No murmur heard  Pulmonary:      Effort: Pulmonary effort is normal  No respiratory distress  Breath sounds: Normal breath sounds  No wheezing, rhonchi or rales  Abdominal:      General: There is no distension  Palpations: Abdomen is soft  Tenderness: There is no abdominal tenderness  There is no guarding or rebound  Musculoskeletal:         General: Tenderness present  No swelling, deformity, signs of injury or edema  Cervical back: Neck supple  Comments: B/L posterior heel with tenderness with palp - no swelling redness/warmth/plantar heel pain   Lymphadenopathy:      Cervical: No cervical adenopathy  Skin:     General: Skin is warm and dry  Coloration: Skin is not pale  Findings: No rash  Neurological:      General: No focal deficit present  Mental Status: She is alert  Motor: No abnormal muscle tone  Gait: Gait normal    Psychiatric:         Mood and Affect: Mood and affect and mood normal          Behavior: Behavior normal          Thought Content:  Thought content normal          Judgment: Judgment normal           Davey Weston DO  [address] [place]

## 2022-11-25 NOTE — PATIENT INSTRUCTIONS

## 2022-12-19 ENCOUNTER — APPOINTMENT (OUTPATIENT)
Dept: RADIOLOGY | Age: 67
End: 2022-12-19

## 2022-12-19 ENCOUNTER — OFFICE VISIT (OUTPATIENT)
Dept: PODIATRY | Facility: CLINIC | Age: 67
End: 2022-12-19

## 2022-12-19 VITALS — WEIGHT: 132.8 LBS | BODY MASS INDEX: 24.44 KG/M2 | HEIGHT: 62 IN

## 2022-12-19 DIAGNOSIS — M79.672 FOOT PAIN, BILATERAL: ICD-10-CM

## 2022-12-19 DIAGNOSIS — M79.671 FOOT PAIN, BILATERAL: ICD-10-CM

## 2022-12-19 NOTE — PROGRESS NOTES
Assessment/Plan:    No problem-specific Assessment & Plan notes found for this encounter  Diagnoses and all orders for this visit:    Foot pain, bilateral  Comments:  Sounds c/w plantar fasciitis but is at achilles region as well - will refer to podiatry d/t duration of symptoms  Orders:  -     Ambulatory Referral to Podiatry  -     X-ray foot left 3+ views; Future  -     X-ray foot right 3+ views; Future      Plan:  -Orders for bilateral foot x-rays  -Patient to follow up in office after radiographs are completed   -Discussed potential etiology of patients complaints and all questions     Subjective:      Patient ID: Livan Urena is a 74 y o  female  Patient presents with bilateral heel pain  Patient reports that it has been present for several years  She reports that it has slowly gotten worse over time  It has limited her activity as she was previously active and could hike up to 10 miles per day  She reports that she can now only walk 1 mile before pain begins to slow her down  She denies trauma to the area  Reports improvement with symptoms after she has "warmed up" to physical activity  Patient also reports two "bumps" on the top of her feet that are not painful  They have been present for several years and have been slowly growing  She would like to investigate them to determine what they are from  Denies any other pedal complaints  Denies N/V/F/SOB       The following portions of the patient's history were reviewed and updated as appropriate: allergies, current medications, past family history, past medical history, past social history, past surgical history and problem list     Review of Systems   Constitutional: Negative  HENT: Negative  Eyes: Negative  Respiratory: Negative  Cardiovascular: Negative  Gastrointestinal: Negative  Musculoskeletal: Positive for arthralgias and myalgias  Skin: Negative  Neurological: Negative            Objective:      Ht 5' 2" (1 575 m) Wt 60 2 kg (132 lb 12 8 oz)   BMI 24 29 kg/m²          Physical Exam  Cardiovascular:      Pulses:           Dorsalis pedis pulses are 2+ on the right side and 2+ on the left side  Posterior tibial pulses are 2+ on the right side and 2+ on the left side  Musculoskeletal:      Right foot: Decreased range of motion  Deformity present  Left foot: Decreased range of motion  Deformity present  Feet:      Right foot:      Protective Sensation: 9 sites tested  9 sites sensed  Skin integrity: Skin integrity normal       Toenail Condition: Right toenails are normal       Left foot:      Protective Sensation: 9 sites tested  9 sites sensed  Skin integrity: Skin integrity normal       Toenail Condition: Left toenails are normal       Comments: B/L Lower extermity: bilateraly gastroc/soleal equinus noted  There is anterior cavus of the feet noted  Pain on palpation of the insertion of the achilles tendon   MMT 5/5       Biomechanical Exam of LE:  Hip ROM WNL Bilateral, external and internal rotation about equal at 45° b/l  Knee ROM WNL Bilateral, no hyperextension noted b/l, no genu varum/valgum noted b/l  Ankle dorsiflexion with knee extended is limited and unable to get pass vertical on right and limited and unable to get pass vertical on left  Ankle dorsiflexion with knee flexed is limited and unable to get pass vertical on right and limited and unable to get pass vertical on left  Malleolar positioning is WNL b/l  STJ ROM WNL b/l, heel inversion is approximately 20° on right and 20° on left; heel eversion is approximately 10° on right and 10° on left; neutral calcaneal stance position is 2 varus°   1st ray ROM WNL b/l, able to dorsiflex/plantarflex b/l  Tibial varum is 0° b/l, Resting calcaneal stance position is varus and approximately 2° on right and varus and approximately 2° on left  Gait analysis: other  Gross deformity noted: anterior cavus, equinus and pes cavus

## 2022-12-19 NOTE — LETTER
December 19, 2022     Charlene Boyce, [address] [place] [address] [address]  [facility]  [address] [address] [address]    Patient: Danita Gomez   YOB: 1955   Date of Visit: 12/19/2022       Dear Dr Ulises Howell: Thank you for referring Danita Gomez to me for evaluation  Below are my notes for this consultation  If you have questions, please do not hesitate to call me  I look forward to following your patient along with you  Sincerely,        Christa Shelton DPM        CC: No Recipients  [facility]  12/19/2022 11:15 AM  Cosign Needed  Assessment/Plan:    No problem-specific Assessment & Plan notes found for this encounter  Diagnoses and all orders for this visit:    Foot pain, bilateral  Comments:  Sounds c/w plantar fasciitis but is at achilles region as well - will refer to podiatry d/t duration of symptoms  Orders:  -     Ambulatory Referral to Podiatry  -     X-ray foot left 3+ views; Future  -     X-ray foot right 3+ views; Future     Plan:  -Orders for bilateral foot x-rays  -Patient to follow up in office after radiographs are completed   -Discussed potential etiology of patients complaints and all questions     Subjective:     Patient ID: Danita Gomez is a 74 y o  female  Patient presents with bilateral heel pain  Patient reports that it has been present for several years  She reports that it has slowly gotten worse over time  It has limited her activity as she was previously active and could hike up to 10 miles per day  She reports that she can now only walk 1 mile before pain begins to slow her down  She denies trauma to the area  Reports improvement with symptoms after she has "warmed up" to physical activity  Patient also reports two "bumps" on the top of her feet that are not painful  They have been present for several years and have been slowly growing  She would like to investigate them to determine what they are from  Denies any other pedal complaints  Denies N/V/F/SOB       The following portions of the patient's history were reviewed and updated as appropriate: allergies, current medications, past family history, past medical history, past social history, past surgical history and problem list     Review of Systems   Constitutional: Negative  HENT: Negative  Eyes: Negative  Respiratory: Negative  Cardiovascular: Negative  Gastrointestinal: Negative  Musculoskeletal: Positive for arthralgias and myalgias  Skin: Negative  Neurological: Negative  Objective:      Ht 5' 2" (1 575 m)   Wt 60 2 kg (132 lb 12 8 oz)   BMI 24 29 kg/m²         Physical Exam  Cardiovascular:      Pulses:           Dorsalis pedis pulses are 2+ on the right side and 2+ on the left side  Posterior tibial pulses are 2+ on the right side and 2+ on the left side  Musculoskeletal:      Right foot: Decreased range of motion  Deformity present  Left foot: Decreased range of motion  Deformity present  Feet:      Right foot:      Protective Sensation: 9 sites tested  9 sites sensed  Skin integrity: Skin integrity normal       Toenail Condition: Right toenails are normal       Left foot:      Protective Sensation: 9 sites tested  9 sites sensed  Skin integrity: Skin integrity normal       Toenail Condition: Left toenails are normal       Comments: B/L Lower extermity: bilateraly gastroc/soleal equinus noted  There is anterior cavus of the feet noted  Pain on palpation of the insertion of the achilles tendon   MMT 5/5      Biomechanical Exam of LE:  Hip ROM WNL Bilateral, external and internal rotation about equal at 45° b/l  Knee ROM WNL Bilateral, no hyperextension noted b/l, no genu varum/valgum noted b/l  Ankle dorsiflexion with knee extended is limited and unable to get pass vertical on right and limited and unable to get pass vertical on left  Ankle dorsiflexion with knee flexed is limited and unable to get pass vertical on right and limited and unable to get pass vertical on left  Malleolar positioning is WNL b/l  STJ ROM WNL b/l, heel inversion is approximately 20° on right and 20° on left; heel eversion is approximately 10° on right and 10° on left; neutral calcaneal stance position is 2 varus°   1st ray ROM WNL b/l, able to dorsiflex/plantarflex b/l  Tibial varum is 0° b/l, Resting calcaneal stance position is varus and approximately 2° on right and varus and approximately 2° on left  Gait analysis: other  Gross deformity noted: anterior cavus, equinus and pes cavus

## 2023-01-16 ENCOUNTER — OFFICE VISIT (OUTPATIENT)
Dept: PODIATRY | Facility: CLINIC | Age: 68
End: 2023-01-16

## 2023-01-16 VITALS
SYSTOLIC BLOOD PRESSURE: 111 MMHG | HEIGHT: 62 IN | HEART RATE: 76 BPM | WEIGHT: 134 LBS | DIASTOLIC BLOOD PRESSURE: 71 MMHG | BODY MASS INDEX: 24.66 KG/M2

## 2023-01-16 DIAGNOSIS — M65.28 CALCIFIC ACHILLES TENDONITIS: Primary | ICD-10-CM

## 2023-01-16 PROBLEM — M76.60 CALCIFIC ACHILLES TENDONITIS: Status: ACTIVE | Noted: 2023-01-16

## 2023-01-16 RX ORDER — SODIUM PICOSULFATE, MAGNESIUM OXIDE, AND ANHYDROUS CITRIC ACID 10; 3.5; 12 MG/160ML; G/160ML; G/160ML
LIQUID ORAL
COMMUNITY
Start: 2022-12-22

## 2023-01-16 NOTE — PROGRESS NOTES
This patient was seen on 1/16/23  My role is Foot , Ankle, and Wound Specialist    SUBJECTIVE    Chief Complaint:  Painful heels     Patient ID: Elaine Bowens is a 74 y o  female  Patient presents with bilateral heel pain  Patient reports that it has been present for several years  She reports that it has slowly gotten worse over time  It has limited her activity as she was previously active and could hike up to 10 miles per day  She reports that she can now only walk 1 mile before pain begins to slow her down  She denies trauma to the area  Reports improvement with symptoms after she has "warmed up" to physical activity  Patient also reports two "bumps" on the top of her feet that are not painful  They have been present for several years and have been slowly growing  She did complete xrays since her last visit  She hasn't been walking much as she is taking care of her elderly mother lately  The following portions of the patient's history were reviewed and updated as appropriate: allergies, current medications, past family history, past medical history, past social history, past surgical history and problem list     Review of Systems   Constitutional: Positive for activity change  Negative for appetite change, chills, diaphoresis, fatigue, fever and unexpected weight change  Respiratory: Negative  Musculoskeletal: Positive for gait problem and myalgias  OBJECTIVE      /71   Pulse 76   Ht 5' 2" (1 575 m) Comment: verbal  Wt 60 8 kg (134 lb)   BMI 24 51 kg/m²     Foot/Ankle Musculoskeletal Exam    General    Neurological: alert  General additional comments: I note bilateral anterior cavus foot type with a bony prominence over the first metatarsal cuneiform joints bilaterally  I note pain on palpation bilateral Achilles tendon attachments posterior calcaneous  Physical Exam  Vitals and nursing note reviewed     Constitutional:       General: She is not in acute distress  Appearance: Normal appearance  She is not ill-appearing, toxic-appearing or diaphoretic  Musculoskeletal:         General: Tenderness and deformity present  Comments: I note bilateral anterior cavus foot type with a bony prominence over the first metatarsal cuneiform joints bilaterally  I note pain on palpation bilateral Achilles tendon attachments posterior calcaneous  Skin:     Capillary Refill: Capillary refill takes less than 2 seconds  Neurological:      Mental Status: She is alert  ASSESSMENT     Diagnoses and all orders for this visit:    Calcific Achilles tendonitis    Other orders  -     sodium picosulfate, magnesium oxide, citric acid (Clenpiq) 10-3 5-12 MG-GM -GM/160ML SOLN; Follow directions on sheet given         Problem List Items Addressed This Visit        Musculoskeletal and Integument    Calcific Achilles tendonitis - Primary           PLAN    I personally reviewed the xrays of the bilateral feet dated 12/19/22 noting the cavus foot deformity and 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

## 2023-03-21 ENCOUNTER — HOSPITAL ENCOUNTER (OUTPATIENT)
Dept: MAMMOGRAPHY | Facility: IMAGING CENTER | Age: 68
Discharge: HOME | End: 2023-03-21

## 2023-03-21 ENCOUNTER — HOSPITAL ENCOUNTER (OUTPATIENT)
Dept: BONE DENSITY | Facility: IMAGING CENTER | Age: 68
Discharge: HOME | End: 2023-03-21

## 2023-03-21 VITALS — HEIGHT: 62 IN | WEIGHT: 130 LBS | BODY MASS INDEX: 23.92 KG/M2

## 2023-03-21 VITALS — HEIGHT: 62 IN | BODY MASS INDEX: 24 KG/M2 | WEIGHT: 130.4 LBS

## 2023-03-21 DIAGNOSIS — Z12.31 ENCOUNTER FOR SCREENING MAMMOGRAM FOR MALIGNANT NEOPLASM OF BREAST: ICD-10-CM

## 2023-03-21 DIAGNOSIS — E28.39 MENOPAUSE OVARIAN FAILURE: ICD-10-CM

## 2023-07-03 ENCOUNTER — APPOINTMENT (OUTPATIENT)
Dept: LAB | Facility: HOSPITAL | Age: 68
End: 2023-07-03
Payer: COMMERCIAL

## 2023-07-03 DIAGNOSIS — E04.1 UNINODULAR GOITER (NONTOXIC): ICD-10-CM

## 2023-07-03 LAB
T4 SERPL-MCNC: 7.43 UG/DL (ref 6.09–12.23)
TSH SERPL DL<=0.05 MIU/L-ACNC: 1.37 UIU/ML (ref 0.45–4.5)

## 2023-07-03 PROCEDURE — 84443 ASSAY THYROID STIM HORMONE: CPT

## 2023-07-03 PROCEDURE — 84436 ASSAY OF TOTAL THYROXINE: CPT

## 2023-07-03 PROCEDURE — 36415 COLL VENOUS BLD VENIPUNCTURE: CPT

## 2023-09-06 DIAGNOSIS — B00.9 RECURRENT HERPES SIMPLEX: ICD-10-CM

## 2023-09-06 DIAGNOSIS — N34.2 URETHRITIS: Primary | ICD-10-CM

## 2023-09-06 RX ORDER — VALACYCLOVIR HYDROCHLORIDE 500 MG/1
500 TABLET, FILM COATED ORAL DAILY
Qty: 90 TABLET | Refills: 2 | Status: SHIPPED | OUTPATIENT
Start: 2023-09-06 | End: 2023-12-05

## 2023-09-06 RX ORDER — NITROFURANTOIN MACROCRYSTALS 50 MG/1
CAPSULE ORAL
Qty: 30 CAPSULE | Refills: 2 | Status: SHIPPED | OUTPATIENT
Start: 2023-09-06

## 2023-09-07 PROCEDURE — 88305 TISSUE EXAM BY PATHOLOGIST: CPT | Performed by: PATHOLOGY

## 2023-09-11 ENCOUNTER — LAB REQUISITION (OUTPATIENT)
Dept: LAB | Facility: HOSPITAL | Age: 68
End: 2023-09-11
Payer: COMMERCIAL

## 2023-09-11 DIAGNOSIS — D48.5 NEOPLASM OF UNCERTAIN BEHAVIOR OF SKIN: ICD-10-CM

## 2023-09-14 PROCEDURE — 88305 TISSUE EXAM BY PATHOLOGIST: CPT | Performed by: PATHOLOGY

## 2023-10-10 ENCOUNTER — TELEPHONE (OUTPATIENT)
Dept: FAMILY MEDICINE CLINIC | Facility: HOSPITAL | Age: 68
End: 2023-10-10

## 2023-10-10 ENCOUNTER — TELEMEDICINE (OUTPATIENT)
Dept: FAMILY MEDICINE CLINIC | Facility: HOSPITAL | Age: 68
End: 2023-10-10
Payer: MEDICARE

## 2023-10-10 VITALS — WEIGHT: 130 LBS | HEIGHT: 62 IN | BODY MASS INDEX: 23.92 KG/M2

## 2023-10-10 DIAGNOSIS — U07.1 COVID-19: Primary | ICD-10-CM

## 2023-10-10 PROCEDURE — 99214 OFFICE O/P EST MOD 30 MIN: CPT | Performed by: INTERNAL MEDICINE

## 2023-10-10 RX ORDER — NIRMATRELVIR AND RITONAVIR 300-100 MG
3 KIT ORAL 2 TIMES DAILY
Qty: 30 TABLET | Refills: 0 | Status: SHIPPED | OUTPATIENT
Start: 2023-10-10 | End: 2023-10-15

## 2023-10-10 NOTE — PROGRESS NOTES
COVID-19 Outpatient Progress Note    Assessment/Plan:    Problem List Items Addressed This Visit    None  Visit Diagnoses       COVID-19    -  Primary    Relevant Medications    nirmatrelvir & ritonavir (Paxlovid, 300/100,) tablet therapy pack           Disposition:     Patient with asymptomatic/mild COVID-19: They were recommended to isolate for at least 5 days (day 0 is the day symptoms appeared or the date the specimen was collected for the positive test for people who are asymptomatic). If they are asymptomatic or symptoms are improving with no fevers in the past 24 hours, isolation may be ended followed by 5 days of wearing a high quality mask when around others to minimize risk of infecting others. They should wear a mask through day 10 and a test-based strategy may be used to remove a mask sooner. If COVID symptoms worsen after ending isolation, restart isolation at day 0. Discussed symptom directed medication options with patient. Discussed vitamin D, vitamin C, and/or zinc supplementation with patient. She was advised to self isolate and to avoid all public places/transportation/stores for 5+5=10 days. She has no one else at home with her that she needs to isolate at home. She was advised to monitor symptoms to call with persistent fever past day 10 OR with any new/worse symptoms        Patient meets criteria for Paxlovid and they have been counseled appropriately regarding risks, benefits, side effects, and alternative treatment options. After discussion, patient agrees to treatment. Possible side effects of Paxlovid? Possible side effects of Paxlovid are:  - Liver Problems. Notify us right away if you start to experience loss of appetite, yellowing of your skin and the whites of eyes (jaundice), dark-colored urine, pale colored stools and itchy skin, stomach area (abdominal) pain. - Resistance to HIV Medicines.  If you have untreated HIV infection, Paxlovid may lead to some HIV medicines not working as well in the future. - Other possible side effects include: altered sense of taste, diarrhea, high blood pressure, or muscle aches. I have spent a total time of 14 minutes on the day of the encounter for this patient including discussing prognosis, risks and benefits of treatment options, instructions for management, patient and family education, importance of treatment compliance, impressions, documenting in the medical record and obtaining or reviewing history. Encounter provider: Marlene Canales DO     Provider located at: [address] [place] [address]   [id number]  [facility] [id number]     Recent Visits  No visits were found meeting these conditions. Showing recent visits within past 7 days and meeting all other requirements  Today's Visits  Date Type Provider Dept   10/10/23 Telemedicine Marlene Canales DO [facility]   10/10/23 Telephone [facility] 0   Showing today's visits and meeting all other requirements  Future Appointments  No visits were found meeting these conditions. Showing future appointments within next 150 days and meeting all other requirements     This virtual check-in was done via [address] [place] [address] and patient was informed that this is a secure, HIPAA-compliant platform. She agrees to proceed. Patient agrees to participate in a virtual check in via telephone or video visit instead of presenting to the office to address urgent/immediate medical needs. Patient is aware this is a billable service. She acknowledged consent and understanding of privacy and security of the video platform. The patient has agreed to participate and understands they can discontinue the visit at any time. After connecting through [facility], the patient was identified by name and date of birth.  Manuel Bauer was informed that this was a telemedicine visit and that the exam was being conducted confidentially over secure lines. My office door was closed. No one else was in the room. Corey Gotti acknowledged consent and understanding of privacy and security of the telemedicine visit. I informed the patient that I have reviewed her record in Epic and presented the opportunity for her to ask any questions regarding the visit today. The patient agreed to participate. Verification of patient location:  Patient is located in the following state in which I hold an active license: [place]    Subjective:   Corey Gotti is a 79 y.o. female who has been screened for COVID-19. Symptom change since last report: improving. Patient's symptoms include fever, chills, fatigue, nasal congestion, sore throat, anosmia, loss of taste, cough, myalgias and headache. Patient denies shortness of breath, chest tightness, abdominal pain, nausea, vomiting and diarrhea. - Date of symptom onset: 10/8/2023  - Date of positive COVID-19 test: 10/9/2023. Type of test: Home antigen. Patient with typical symptoms of COVID-19 and they attest that they were positive on home rapid antigen testing. Image of positive result is not able to be uploaded into their chart. COVID-19 vaccination status: Fully vaccinated with booster    Leidy Smalls has been staying home and has isolated themselves in her home. Taking care not to share personal items?: is not      Cleaning all surfaces that are touched often?: is not      Wearing a mask when leaving room?: is not      Pt woke up Sun am with a ST.  ST improved but she has had ear fullness and congestion and cough. She took home COVID test yesterday and it was +. She felt feverish but never took her temp. She had drenching sweats this am.     Lab Results   Component Value Date    SARSCOV2 Negative 06/19/2022       Review of Systems   Constitutional:  Positive for chills, fatigue and fever. HENT:  Positive for congestion, ear pain and sore throat. Respiratory:  Positive for cough. Negative for chest tightness and shortness of breath. Gastrointestinal:  Negative for abdominal pain, diarrhea, nausea and vomiting. Musculoskeletal:  Positive for myalgias. Neurological:  Positive for headaches. Hematological:  Positive for adenopathy. Current Outpatient Medications on File Prior to Visit   Medication Sig    Cholecalciferol (Vitamin D3 Ultra Strength) 125 MCG (5000 UT) capsule Take 5,000 Units by mouth daily    cycloSPORINE (RESTASIS) 0.05 % ophthalmic emulsion Apply 1 drop to eye every 12 (twelve) hours    Multiple Vitamin (MULTIVITAMINS PO) Take 1 tablet by mouth daily    nitrofurantoin (MACRODANTIN) 50 mg capsule Please take 1 tablet orally after sexual intimacy    Probiotic Product (PROBIOTIC ADVANCED PO) Take 1 tablet by mouth daily    sodium picosulfate, magnesium oxide, citric acid (Clenpiq) 10-3.5-12 MG-GM -GM/160ML SOLN Follow directions on sheet given    valACYclovir (VALTREX) 500 mg tablet Take 1 tablet (500 mg total) by mouth daily 1 tab PO q 12 hrs x 3 days as needed       Objective:    Ht 5' 2" (1.575 m)   Wt 59 kg (130 lb)   BMI 23.78 kg/m²        Physical Exam  Vitals and nursing note reviewed. Constitutional:       General: She is not in acute distress. Appearance: She is not ill-appearing. HENT:      Head: Normocephalic and atraumatic. Eyes:      General:         Right eye: No discharge. Left eye: No discharge. Conjunctiva/sclera: Conjunctivae normal.   Pulmonary:      Effort: Pulmonary effort is normal. No respiratory distress. Skin:     Coloration: Skin is not pale. Findings: No rash. Psychiatric:         Behavior: Behavior normal.         Thought Content:  Thought content normal.         Judgment: Judgment normal.       Kevin Hutchinson DO

## 2023-10-10 NOTE — TELEPHONE ENCOUNTER
Left message that she tested positive for covid. She does not have a fever, but she is coughing, congestion and very fatigue. Wants to know what she should do? We do not have any openings for tomorrow.

## 2023-10-10 NOTE — TELEPHONE ENCOUNTER
Pt started with symptoms early Sunday morning with a sore throat. Pt states Sunday evening she was feverish. Pt has been taking sudafed and nyquil. Please advise what to do since we do not have any appts.

## 2023-10-19 ENCOUNTER — ANNUAL EXAM (OUTPATIENT)
Dept: OBGYN CLINIC | Facility: CLINIC | Age: 68
End: 2023-10-19
Payer: MEDICARE

## 2023-10-19 VITALS
DIASTOLIC BLOOD PRESSURE: 78 MMHG | WEIGHT: 132.6 LBS | HEIGHT: 62 IN | BODY MASS INDEX: 24.4 KG/M2 | SYSTOLIC BLOOD PRESSURE: 122 MMHG

## 2023-10-19 DIAGNOSIS — N89.8 VAGINAL ODOR: ICD-10-CM

## 2023-10-19 DIAGNOSIS — N95.1 SYMPTOMS, SUCH AS FLUSHING, SLEEPLESSNESS, HEADACHE, LACK OF CONCENTRATION, ASSOCIATED WITH THE MENOPAUSE: ICD-10-CM

## 2023-10-19 DIAGNOSIS — Z12.31 ENCOUNTER FOR SCREENING MAMMOGRAM FOR MALIGNANT NEOPLASM OF BREAST: ICD-10-CM

## 2023-10-19 DIAGNOSIS — Z01.419 WOMEN'S ANNUAL ROUTINE GYNECOLOGICAL EXAMINATION: Primary | ICD-10-CM

## 2023-10-19 PROCEDURE — G0101 CA SCREEN;PELVIC/BREAST EXAM: HCPCS | Performed by: OBSTETRICS & GYNECOLOGY

## 2023-10-19 PROCEDURE — 81514 NFCT DS BV&VAGINITIS DNA ALG: CPT | Performed by: OBSTETRICS & GYNECOLOGY

## 2023-10-19 RX ORDER — ESTRADIOL AND NORETHINDRONE ACETATE 1; .5 MG/1; MG/1
1 TABLET ORAL DAILY
Qty: 60 TABLET | Refills: 2 | Status: SHIPPED | OUTPATIENT
Start: 2023-10-19

## 2023-10-19 NOTE — PROGRESS NOTES
Assessment/Plan:    The patient was informed of a stable menopausal GYN examination. She was complaining of increasing vaginal odor. Examination was benign. We did do a molecular panel for culture. She was reassured. Her menopause symptoms are worsening with increasing insomnia. We have decided to try a short course of Activella/HRT. Return to my office in 4 weeks for repeat evaluation. The patient is also complaining of increasing urge incontinence we put a referral in for consultation with urogynecology. She will continue get yearly mammograms. Colonoscopies are up-to-date. She is content with her weight would like to lose more. She is concerned about the health of her mother who has a history of breast cancer there may be some signs of metastasis. Subjective:      Patient ID: Adalid Suh is a 79 y.o. female. HPI  This is a 53-year-old white female, she is a  1 para 1 with 1 vaginal delivery approximately 27 years ago. She is now menopausal.  She is a retired CPA. She presents today for yearly exam.  Minimal symptoms are getting worse with inability to sleep and hot flashes. She is also having some increased urge incontinence. She is not happy with her weight. She feels safe at home but she is a dentist on a regular basis. She had cataract surgery a few years ago. Currently not in a serious relationship. Colonoscopy is up-to-date. She will continue get yearly mammograms. She has a history of genital herpes which is under control. Insert straight with increased stress. She is also complaining of problems with sleep. She was positive for COVID in  of this year. Family history is a significant for her mother with breast cancer almost 8 years old and has spread to her lymph nodes. She is concerned about her mother. She still troubled by menopause symptoms including insomnia. She is also complaining about increasing vaginal odor.           The following portions of the patient's history were reviewed and updated as appropriate: allergies, current medications, past family history, past medical history, past social history, past surgical history, and problem list.    Review of Systems   Genitourinary:         Menopause symptoms including insomnia   All other systems reviewed and are negative. Objective:      /78   Ht 5' 2" (1.575 m)   Wt 60.1 kg (132 lb 9.6 oz)   BMI 24.25 kg/m²          Physical Exam  Vitals reviewed. Exam conducted with a chaperone present. Constitutional:       Appearance: Normal appearance. She is normal weight. HENT:      Head: Normocephalic and atraumatic. Nose: Nose normal.      Mouth/Throat:      Mouth: Mucous membranes are moist.   Eyes:      Pupils: Pupils are equal, round, and reactive to light. Cardiovascular:      Rate and Rhythm: Normal rate and regular rhythm. Pulses: Normal pulses. Heart sounds: Normal heart sounds. Pulmonary:      Effort: Pulmonary effort is normal.      Breath sounds: Normal breath sounds. Chest:   Breasts:     Right: Normal. No swelling, bleeding, inverted nipple, mass, nipple discharge or skin change. Left: Normal. No swelling, bleeding, inverted nipple, mass, nipple discharge or skin change. Abdominal:      General: Abdomen is flat. Bowel sounds are normal. There is no distension. Palpations: Abdomen is soft. There is no hepatomegaly, splenomegaly or mass. Tenderness: There is no abdominal tenderness. There is no guarding or rebound. Hernia: No hernia is present. There is no hernia in the left inguinal area or right inguinal area. Genitourinary:     General: Normal vulva. Pubic Area: No rash or pubic lice. Labia:         Right: No rash, tenderness, lesion or injury. Left: No rash, tenderness, lesion or injury. Urethra: No prolapse, urethral pain, urethral swelling or urethral lesion.       Vagina: Normal. No signs of injury and foreign body. No vaginal discharge, erythema, tenderness, bleeding, lesions or prolapsed vaginal walls. Cervix: Normal.      Uterus: Normal. Not deviated, not enlarged, not fixed and not tender. Adnexa: Right adnexa normal and left adnexa normal.        Right: No mass, tenderness or fullness. Left: No mass, tenderness or fullness. Rectum: Normal.      Comments: The external genitalia normal limits the vagina is clean. The patient was complaining about increasing vaginal odor. Molecular panel screening was done we believe this will be negative. Urethra and bladder normal recommendation. There is no cervical motion tenderness. A Pap smear was not performed. Musculoskeletal:         General: Normal range of motion. Cervical back: Normal range of motion and neck supple. Lymphadenopathy:      Upper Body:      Right upper body: No supraclavicular or axillary adenopathy. Left upper body: No supraclavicular or axillary adenopathy. Skin:     General: Skin is warm and dry. Neurological:      General: No focal deficit present. Mental Status: She is alert and oriented to person, place, and time. Psychiatric:         Mood and Affect: Mood normal.         Behavior: Behavior normal.         Thought Content:  Thought content normal.

## 2023-10-19 NOTE — PATIENT INSTRUCTIONS
The patient was informed of a stable GYN examination. We did do a vaginal culture for questionable vaginal discharge. We will start her on Activella for menopausal symptoms including insomnia. Return to my office in 6 weeks for reevaluation. She will continue get yearly mammograms. She will watch her weight. We will also make a referral to urogynecology for urge incontinence. she will return to my office in 4 weeks for reevaluation of the HRT.

## 2023-10-20 ENCOUNTER — TELEPHONE (OUTPATIENT)
Dept: OBGYN CLINIC | Facility: CLINIC | Age: 68
End: 2023-10-20

## 2023-10-20 LAB
C GLABRATA DNA VAG QL NAA+PROBE: NEGATIVE
C KRUSEI DNA VAG QL NAA+PROBE: NEGATIVE
CANDIDA SP 6 PNL VAG NAA+PROBE: NEGATIVE
T VAGINALIS DNA VAG QL NAA+PROBE: NEGATIVE
VAGINOSIS/ITIS DNA PNL VAG PROBE+SIG AMP: NEGATIVE

## 2023-11-27 ENCOUNTER — OFFICE VISIT (OUTPATIENT)
Dept: OBGYN CLINIC | Facility: CLINIC | Age: 68
End: 2023-11-27
Payer: MEDICARE

## 2023-11-27 VITALS
WEIGHT: 134 LBS | SYSTOLIC BLOOD PRESSURE: 116 MMHG | HEIGHT: 62 IN | DIASTOLIC BLOOD PRESSURE: 70 MMHG | BODY MASS INDEX: 24.66 KG/M2

## 2023-11-27 DIAGNOSIS — N95.0 POSTMENOPAUSAL BLEEDING: Primary | ICD-10-CM

## 2023-11-27 PROCEDURE — 58100 BIOPSY OF UTERUS LINING: CPT | Performed by: OBSTETRICS & GYNECOLOGY

## 2023-11-27 PROCEDURE — 99214 OFFICE O/P EST MOD 30 MIN: CPT | Performed by: OBSTETRICS & GYNECOLOGY

## 2023-11-27 PROCEDURE — 88305 TISSUE EXAM BY PATHOLOGIST: CPT | Performed by: PATHOLOGY

## 2023-11-27 NOTE — PATIENT INSTRUCTIONS
The patient had some bleeding after starting hormone placement therapy including Activella. This last for total of 5 days. Has now stopped that she was instructed to stop. She gave consent today for endometrial biopsy. Image biopsy was performed without difficulty. Scant amount of tissue was obtained. I believe this will all be benign. Will also make arrangements for transvaginal ultrasound for endometrial thickness. The patient be informed results when they return.

## 2023-11-27 NOTE — PROGRESS NOTES
Endometrial biopsy    Date/Time: 11/27/2023 3:45 PM    Performed by: Ariel Farr MD  Authorized by: Ariel Farr MD  Universal Protocol:  Consent: Verbal consent obtained. Risks and benefits: risks, benefits and alternatives were discussed  Consent given by: patient  Timeout called at: 11/27/2023 3:55 PM.  Patient understanding: patient states understanding of the procedure being performed  Patient consent: the patient's understanding of the procedure matches consent given  Procedure consent: procedure consent matches procedure scheduled  Relevant documents: relevant documents present and verified  Test results: test results available and properly labeled  Site marked: the operative site was not marked  Radiology Images displayed and confirmed. If images not available, report reviewed: imaging studies not available  Required items: required blood products, implants, devices, and special equipment available  Patient identity confirmed: verbally with patient    Indication:     Indications: Post-menopausal bleeding      Chronicity of post-menopausal bleeding:  New    Progression of post-menopausal bleeding:  Resolved  Procedure:     Procedure: endometrial biopsy with Pipelle      A bivalve speculum was placed in the vagina: yes      Cervix cleaned and prepped: yes      A paracervical block was performed: no      An intracervical block was performed: no      The cervix was dilated: no      Uterus sounded: yes      Uterus sound depth (cm):  5    Specimen collected: specimen collected and sent to pathology      Patient tolerated procedure well with no complications: yes    Findings:     Uterus size:  Non-gravid    Cervix: normal      Adnexa: normal    Comments:     Procedure comments: This is a 61-year-old white female who was started on Activella for hormone placement therapy over a month ago. She had some spotting beginning on 20 November. The spotting stopped on the 25th. There is no bleeding today.   This bleeding stopped after she stopped the medication. Consent was given for endometrial biopsy. Endometrial biopsy was done. Scant amount of tissue was obtained. I believe this findings will be negative. Will also make arrangements for a transvaginal ultrasound for endometrial thickness. If the thickness is less than 4 mm then we will just watch and observe.   If it is greater than 4 mm it may consider D&C hysteroscopy in the hospital.

## 2023-12-01 PROCEDURE — 88305 TISSUE EXAM BY PATHOLOGIST: CPT | Performed by: PATHOLOGY

## 2023-12-07 ENCOUNTER — HOSPITAL ENCOUNTER (OUTPATIENT)
Dept: ULTRASOUND IMAGING | Facility: HOSPITAL | Age: 68
End: 2023-12-07
Payer: MEDICARE

## 2023-12-07 DIAGNOSIS — N95.0 POSTMENOPAUSAL BLEEDING: ICD-10-CM

## 2023-12-07 PROCEDURE — 76830 TRANSVAGINAL US NON-OB: CPT

## 2023-12-07 PROCEDURE — 76856 US EXAM PELVIC COMPLETE: CPT

## 2024-01-24 ENCOUNTER — RA CDI HCC (OUTPATIENT)
Dept: OTHER | Facility: HOSPITAL | Age: 69
End: 2024-01-24

## 2024-01-25 ENCOUNTER — OFFICE VISIT (OUTPATIENT)
Dept: FAMILY MEDICINE CLINIC | Facility: HOSPITAL | Age: 69
End: 2024-01-25
Payer: MEDICARE

## 2024-01-25 VITALS
SYSTOLIC BLOOD PRESSURE: 104 MMHG | HEIGHT: 62 IN | TEMPERATURE: 98.8 F | DIASTOLIC BLOOD PRESSURE: 76 MMHG | BODY MASS INDEX: 24.62 KG/M2 | HEART RATE: 78 BPM | WEIGHT: 133.8 LBS

## 2024-01-25 DIAGNOSIS — Z00.00 MEDICARE ANNUAL WELLNESS VISIT, INITIAL: Primary | ICD-10-CM

## 2024-01-25 DIAGNOSIS — N39.41 URGE URINARY INCONTINENCE: ICD-10-CM

## 2024-01-25 PROCEDURE — G0402 INITIAL PREVENTIVE EXAM: HCPCS | Performed by: INTERNAL MEDICINE

## 2024-01-25 NOTE — PROGRESS NOTES
Assessment and Plan:     Problem List Items Addressed This Visit    None  Visit Diagnoses       Medicare annual wellness visit, initial    -  Primary    Urge urinary incontinence        Lifestyle changes to help with symptoms reviewed, call with new/worse symptoms/UTI concerns            Depression Screening and Follow-up Plan: Patient was screened for depression during today's encounter. They screened negative with a PHQ-2 score of 0.    Urinary Incontinence Plan of Care: counseling topics discussed: practice Kegel (pelvic floor strengthening) exercises, use restroom every 2 hours, limit alcohol, caffeine, spicy foods, and acidic foods and limiting fluid intake 3-4 hours before bed.       Preventive health issues were discussed with patient, and age appropriate screening tests were ordered as noted in patient's After Visit Summary.    Colonoscopy 7/23  Mammo 3/23 - scheduled for April 2024  Dexa 3/23 - osteopenia  PAP 10/22  TSH 7/23  FLP 11/22 - just done yesterday at [facility] screening    Pt deferring flu     Personalized health advice and appropriate referrals for health education or preventive services given if needed, as noted in patient's After Visit Summary.     History of Present Illness:     Patient presents for a Medicare Wellness Visit    HPI Pt here for AWV    Had [facility] screening yesterday: , HDL 65, , LDL 89 and FBS 97    Has thyroid labs and US done by Dr. Ríos     She feels her diet is good and she exercises almost daily          Patient Care Team:  Lorin Conrad DO as PCP - General  Shen Lemons MD (Gynecology)  CARLEE Adair MD (General Surgery)  Mau Alvarenga MD (Dermatology)     Review of Systems:     Review of Systems   Constitutional:  Negative for chills and fever.   HENT:  Negative for congestion, hearing loss and trouble swallowing.    Eyes:  Negative for pain and visual disturbance.   Respiratory:  Negative for cough, shortness of breath and wheezing.     Cardiovascular:  Negative for chest pain, palpitations and leg swelling.   Gastrointestinal:  Negative for abdominal pain, blood in stool, constipation, diarrhea, nausea and vomiting.   Genitourinary:  Positive for urgency. Negative for difficulty urinating, dysuria, hematuria, vaginal bleeding and vaginal pain.   Musculoskeletal:  Negative for back pain and neck pain.   Skin:  Negative for rash and wound.   Neurological:  Negative for dizziness, syncope, light-headedness and headaches.   Hematological:  Bruises/bleeds easily.   Psychiatric/Behavioral:  Negative for confusion and sleep disturbance.         Problem List:     Patient Active Problem List   Diagnosis    Thyroid nodule    Genital herpes    Insomnia    Diverticulosis of large intestine without hemorrhage    Age-related cataract of both eyes    Hemorrhoids    Polyp of colon    Calcific Achilles tendonitis      Past Medical and Surgical History:     Past Medical History:   Diagnosis Date    Thyroid nodule      Past Surgical History:   Procedure Laterality Date    CYST REMOVAL      hand excision of tendon cyst      Family History:     Family History   Problem Relation Age of Onset    Hypertension Mother     Breast cancer Mother 93    Stroke Mother     Heart attack Father     No Known Problems Sister     No Known Problems Sister     No Known Problems Daughter     No Known Problems Maternal Grandmother     No Known Problems Maternal Grandfather     Ovarian cancer Paternal Grandmother         age unknown    No Known Problems Paternal Grandfather     Diabetes Brother     Hepatitis Brother         type C viral    Kidney disease Brother         kidney transplant    Coronary artery disease Brother     Prostate cancer Brother 74    No Known Problems Brother     Cancer Brother 60        Soft tissue sarcoma    No Known Problems Maternal Aunt     No Known Problems Maternal Aunt     No Known Problems Maternal Aunt     No Known Problems Paternal Aunt     No Known  Problems Paternal Aunt     No Known Problems Paternal Aunt       Social History:     Social History     Socioeconomic History    Marital status:      Spouse name: None    Number of children: None    Years of education: None    Highest education level: None   Occupational History     Employer: [employer]     Comment: Retired   Tobacco Use    Smoking status: Never    Smokeless tobacco: Never   Vaping Use    Vaping status: Never Used   Substance and Sexual Activity    Alcohol use: Yes     Alcohol/week: 2.0 standard drinks of alcohol     Types: 2 Glasses of wine per week     Comment: occasional - social on weekends    Drug use: No    Sexual activity: Not Currently   Other Topics Concern    None   Social History Narrative    , lives alone, working full time     Social Determinants of Health     Financial Resource Strain: Low Risk  (1/25/2024)    Overall Financial Resource Strain (CARDIA)     Difficulty of Paying Living Expenses: Not hard at all   Food Insecurity: Not on file   Transportation Needs: No Transportation Needs (1/25/2024)    PRAPARE - Transportation     Lack of Transportation (Medical): No     Lack of Transportation (Non-Medical): No   Physical Activity: Not on file   Stress: Not on file   Social Connections: Not on file   Intimate Partner Violence: Not on file   Housing Stability: Not on file      Medications and Allergies:     Current Outpatient Medications   Medication Sig Dispense Refill    Cholecalciferol (Vitamin D3 Ultra Strength) 125 MCG (5000 UT) capsule Take 5,000 Units by mouth daily      cycloSPORINE (RESTASIS) 0.05 % ophthalmic emulsion Apply 1 drop to eye every 12 (twelve) hours      Multiple Vitamin (MULTIVITAMINS PO) Take 1 tablet by mouth daily      Probiotic Product (PROBIOTIC ADVANCED PO) Take 1 tablet by mouth daily      valACYclovir (VALTREX) 500 mg tablet Take 1 tablet (500 mg total) by mouth daily 1 tab PO q 12 hrs x 3 days as needed 90 tablet 2     No  current facility-administered medications for this visit.     Allergies   Allergen Reactions    Fish-Derived Products - Food Allergy      Other reaction(s): flounder and rajni give her hives    Iodine - Food Allergy Abdominal Pain and Hives      Immunizations:     Immunization History   Administered Date(s) Administered    COVID-19 MODERNA VACC 0.5 ML IM 04/01/2021, 04/30/2021, 12/03/2021, 10/04/2022    INFLUENZA 12/30/2019, 10/04/2022    Influenza, recombinant, quadrivalent,injectable, preservative free 10/26/2020    Pneumococcal Conjugate Vaccine 20-valent (Pcv20), Polysace 11/25/2022    Zoster Vaccine Recombinant 10/26/2020, 10/26/2020, 01/22/2021, 01/22/2021      Health Maintenance:         Topic Date Due    Hepatitis C Screening  Never done    Colorectal Cancer Screening  12/09/2023    Breast Cancer Screening: Mammogram  03/21/2024         Topic Date Due    Influenza Vaccine (1) 09/01/2023    COVID-19 Vaccine (5 - 2023-24 season) 09/01/2023      Medicare Screening Tests and Risk Assessments:     Kasie is here for her Welcome to Medicare visit.     Health Risk Assessment:   Patient rates overall health as very good. Patient feels that their physical health rating is same. Patient is very satisfied with their life. Eyesight was rated as same. Hearing was rated as same. Patient feels that their emotional and mental health rating is same. Patients states they are never, rarely angry. Patient states they are never, rarely unusually tired/fatigued. Pain experienced in the last 7 days has been none. Patient states that she has experienced no weight loss or gain in last 6 months.     Depression Screening:   PHQ-2 Score: 0      Fall Risk Screening:   In the past year, patient has experienced: no history of falling in past year      Urinary Incontinence Screening:   Patient has leaked urine accidently in the last six months. Urge incontinence    Home Safety:  Patient does not have trouble with stairs inside or  outside of their home. Patient has working smoke alarms and has no working carbon monoxide detector. Home safety hazards include: none.     Nutrition:   Current diet is Regular.     Medications:   Patient is currently taking over-the-counter supplements. OTC medications include: see medication list. Patient is able to manage medications.     Activities of Daily Living (ADLs)/Instrumental Activities of Daily Living (IADLs):   Walk and transfer into and out of bed and chair?: Yes  Dress and groom yourself?: Yes    Bathe or shower yourself?: Yes    Feed yourself? Yes  Do your laundry/housekeeping?: Yes  Manage your money, pay your bills and track your expenses?: Yes  Make your own meals?: Yes    Do your own shopping?: Yes    Previous Hospitalizations:   Any hospitalizations or ED visits within the last 12 months?: No      Advance Care Planning:   Living will: Yes    Durable POA for healthcare: Yes    Advanced directive: Yes      Cognitive Screening:   Provider or family/friend/caregiver concerned regarding cognition?: No    PREVENTIVE SCREENINGS      Cardiovascular Screening:    General: Screening Current and Risks and Benefits Discussed      Diabetes Screening:     General: Risks and Benefits Discussed and Screening Current      Colorectal Cancer Screening:     General: Risks and Benefits Discussed and Screening Current      Breast Cancer Screening:     General: Screening Current and Risks and Benefits Discussed      Cervical Cancer Screening:    General: Screening Not Indicated, Risks and Benefits Discussed and Screening Current      Osteoporosis Screening:    General: Risks and Benefits Discussed and Screening Current      Abdominal Aortic Aneurysm (AAA) Screening:        General: Risks and Benefits Discussed and Screening Not Indicated      Lung Cancer Screening:     General: Screening Not Indicated and Risks and Benefits Discussed      Hepatitis C Screening:    General: Risks and Benefits Discussed and Patient  "Declines    Hep C Screening Accepted: No     Screening, Brief Intervention, and Referral to Treatment (SBIRT)    Screening  Typical number of drinks in a day: 0  Typical number of drinks in a week: 2  Interpretation: Low risk drinking behavior.    Single Item Drug Screening:  How often have you used an illegal drug (including marijuana) or a prescription medication for non-medical reasons in the past year? never    Single Item Drug Screen Score: 0  Interpretation: Negative screen for possible drug use disorder    Other Counseling Topics:   Car/seat belt/driving safety and regular weightbearing exercise.     Vision Screening    Right eye Left eye Both eyes   Without correction 20/50 20/50 20/50   With correction           Physical Exam:     /76   Pulse 78   Temp 98.8 °F (37.1 °C) (Tympanic)   Ht 5' 2\" (1.575 m)   Wt 60.7 kg (133 lb 12.8 oz)   BMI 24.47 kg/m²     Physical Exam  Vitals and nursing note reviewed.   Constitutional:       General: She is not in acute distress.     Appearance: She is well-developed. She is not ill-appearing.   HENT:      Head: Normocephalic and atraumatic.      Right Ear: Tympanic membrane and external ear normal. There is no impacted cerumen.      Left Ear: Tympanic membrane and external ear normal. There is no impacted cerumen.      Mouth/Throat:      Mouth: Mucous membranes are moist.      Pharynx: Oropharynx is clear. No oropharyngeal exudate.   Eyes:      General:         Right eye: No discharge.         Left eye: No discharge.      Conjunctiva/sclera: Conjunctivae normal.   Neck:      Thyroid: No thyromegaly.      Vascular: No carotid bruit.      Trachea: No tracheal deviation.   Cardiovascular:      Rate and Rhythm: Normal rate and regular rhythm.      Heart sounds: Normal heart sounds. No murmur heard.  Pulmonary:      Effort: Pulmonary effort is normal. No respiratory distress.      Breath sounds: Normal breath sounds. No wheezing, rhonchi or rales.   Abdominal:      " General: There is no distension.      Palpations: Abdomen is soft.      Tenderness: There is no abdominal tenderness. There is no guarding or rebound.   Musculoskeletal:         General: No deformity or signs of injury.      Cervical back: Neck supple.   Lymphadenopathy:      Cervical: No cervical adenopathy.   Skin:     General: Skin is warm and dry.      Coloration: Skin is not pale.      Findings: No bruising or rash.   Neurological:      General: No focal deficit present.      Mental Status: She is alert. Mental status is at baseline.      Motor: No abnormal muscle tone.      Gait: Gait normal.   Psychiatric:         Mood and Affect: Mood normal.         Behavior: Behavior normal.         Thought Content: Thought content normal.         Judgment: Judgment normal.          Lorin Conrad DO

## 2024-01-25 NOTE — PATIENT INSTRUCTIONS
Medicare Preventive Visit Patient Instructions  Thank you for completing your Welcome to Medicare Visit or Medicare Annual Wellness Visit today. Your next wellness visit will be due in one year (1/25/2025).  The screening/preventive services that you may require over the next 5-10 years are detailed below. Some tests may not apply to you based off risk factors and/or age. Screening tests ordered at today's visit but not completed yet may show as past due. Also, please note that scanned in results may not display below.  Preventive Screenings:  Service Recommendations Previous Testing/Comments   Colorectal Cancer Screening  * Colonoscopy    * Fecal Occult Blood Test (FOBT)/Fecal Immunochemical Test (FIT)  * Fecal DNA/Cologuard Test  * Flexible Sigmoidoscopy Age: 45-75 years old   Colonoscopy: every 10 years (may be performed more frequently if at higher risk)  OR  FOBT/FIT: every 1 year  OR  Cologuard: every 3 years  OR  Sigmoidoscopy: every 5 years  Screening may be recommended earlier than age 45 if at higher risk for colorectal cancer. Also, an individualized decision between you and your healthcare provider will decide whether screening between the ages of 76-85 would be appropriate. Colonoscopy: 07/10/2023  FOBT/FIT: 07/10/2023  Cologuard: 07/10/2023  Sigmoidoscopy: 07/10/2023    Risks and Benefits Discussed  Screening Current     Breast Cancer Screening Age: 40+ years old  Frequency: every 1-2 years  Not required if history of left and right mastectomy Mammogram: 03/21/2023    Screening Current  Risks and Benefits Discussed   Cervical Cancer Screening Between the ages of 21-29, pap smear recommended once every 3 years.   Between the ages of 30-65, can perform pap smear with HPV co-testing every 5 years.   Recommendations may differ for women with a history of total hysterectomy, cervical cancer, or abnormal pap smears in past. Pap Smear: 10/19/2023    Screening Not Indicated  Risks and Benefits  Discussed  Screening Current   Hepatitis C Screening Once for adults born between 1945 and 1965  More frequently in patients at high risk for Hepatitis C Hep C Antibody: Not on file    Risks and Benefits Discussed  Patient Declines   Diabetes Screening 1-2 times per year if you're at risk for diabetes or have pre-diabetes Fasting glucose: 93 mg/dL (11/21/2022)  A1C: No results in last 5 years (No results in last 5 years)  Risks and Benefits Discussed  Screening Current   Cholesterol Screening Once every 5 years if you don't have a lipid disorder. May order more often based on risk factors. Lipid panel: 11/21/2022    Screening Current  Risks and Benefits Discussed     Other Preventive Screenings Covered by Medicare:  Abdominal Aortic Aneurysm (AAA) Screening: covered once if your at risk. You're considered to be at risk if you have a family history of AAA.  Lung Cancer Screening: covers low dose CT scan once per year if you meet all of the following conditions: (1) Age 55-77; (2) No signs or symptoms of lung cancer; (3) Current smoker or have quit smoking within the last 15 years; (4) You have a tobacco smoking history of at least 20 pack years (packs per day multiplied by number of years you smoked); (5) You get a written order from a healthcare provider.  Glaucoma Screening: covered annually if you're considered high risk: (1) You have diabetes OR (2) Family history of glaucoma OR (3)  aged 50 and older OR (4)  American aged 65 and older  Osteoporosis Screening: covered every 2 years if you meet one of the following conditions: (1) You're estrogen deficient and at risk for osteoporosis based off medical history and other findings; (2) Have a vertebral abnormality; (3) On glucocorticoid therapy for more than 3 months; (4) Have primary hyperparathyroidism; (5) On osteoporosis medications and need to assess response to drug therapy.   Last bone density test (DXA Scan): 03/21/2023.  HIV  Screening: covered annually if you're between the age of 15-65. Also covered annually if you are younger than 15 and older than 65 with risk factors for HIV infection. For pregnant patients, it is covered up to 3 times per pregnancy.    Immunizations:  Immunization Recommendations   Influenza Vaccine Annual influenza vaccination during flu season is recommended for all persons aged >= 6 months who do not have contraindications   Pneumococcal Vaccine   * Pneumococcal conjugate vaccine = PCV13 (Prevnar 13), PCV15 (Vaxneuvance), PCV20 (Prevnar 20)  * Pneumococcal polysaccharide vaccine = PPSV23 (Pneumovax) Adults 19-63 yo with certain risk factors or if 65+ yo  If never received any pneumonia vaccine: recommend Prevnar 20 (PCV20)  Give PCV20 if previously received 1 dose of PCV13 or PPSV23   Hepatitis B Vaccine 3 dose series if at intermediate or high risk (ex: diabetes, end stage renal disease, liver disease)   Respiratory syncytial virus (RSV) Vaccine - COVERED BY MEDICARE PART D  * RSVPreF3 (Arexvy) CDC recommends that adults 60 years of age and older may receive a single dose of RSV vaccine using shared clinical decision-making (SCDM)   Tetanus (Td) Vaccine - COST NOT COVERED BY MEDICARE PART B Following completion of primary series, a booster dose should be given every 10 years to maintain immunity against tetanus. Td may also be given as tetanus wound prophylaxis.   Tdap Vaccine - COST NOT COVERED BY MEDICARE PART B Recommended at least once for all adults. For pregnant patients, recommended with each pregnancy.   Shingles Vaccine (Shingrix) - COST NOT COVERED BY MEDICARE PART B  2 shot series recommended in those 19 years and older who have or will have weakened immune systems or those 50 years and older     Health Maintenance Due:      Topic Date Due   • Hepatitis C Screening  Never done   • Colorectal Cancer Screening  12/09/2023   • Breast Cancer Screening: Mammogram  03/21/2024     Immunizations Due:       Topic Date Due   • Influenza Vaccine (1) 09/01/2023   • COVID-19 Vaccine (5 - 2023-24 season) 09/01/2023     Advance Directives   What are advance directives?  Advance directives are legal documents that state your wishes and plans for medical care. These plans are made ahead of time in case you lose your ability to make decisions for yourself. Advance directives can apply to any medical decision, such as the treatments you want, and if you want to donate organs.   What are the types of advance directives?  There are many types of advance directives, and each state has rules about how to use them. You may choose a combination of any of the following:  Living will:  This is a written record of the treatment you want. You can also choose which treatments you do not want, which to limit, and which to stop at a certain time. This includes surgery, medicine, IV fluid, and tube feedings.   Durable power of  for healthcare (DPAHC):  This is a written record that states who you want to make healthcare choices for you when you are unable to make them for yourself. This person, called a proxy, is usually a family member or a friend. You may choose more than 1 proxy.  Do not resuscitate (DNR) order:  A DNR order is used in case your heart stops beating or you stop breathing. It is a request not to have certain forms of treatment, such as CPR. A DNR order may be included in other types of advance directives.  Medical directive:  This covers the care that you want if you are in a coma, near death, or unable to make decisions for yourself. You can list the treatments you want for each condition. Treatment may include pain medicine, surgery, blood transfusions, dialysis, IV or tube feedings, and a ventilator (breathing machine).  Values history:  This document has questions about your views, beliefs, and how you feel and think about life. This information can help others choose the care that you would choose.  Why are  advance directives important?  An advance directive helps you control your care. Although spoken wishes may be used, it is better to have your wishes written down. Spoken wishes can be misunderstood, or not followed. Treatments may be given even if you do not want them. An advance directive may make it easier for your family to make difficult choices about your care.   Urinary Incontinence   Urinary incontinence (UI)  is when you lose control of your bladder. UI develops because your bladder cannot store or empty urine properly. The 3 most common types of UI are stress incontinence, urge incontinence, or both.  Medicines:   May be given to help strengthen your bladder control. Report any side effects of medication to your healthcare provider.  Do pelvic muscle exercises often:  Your pelvic muscles help you stop urinating. Squeeze these muscles tight for 5 seconds, then relax for 5 seconds. Gradually work up to squeezing for 10 seconds. Do 3 sets of 15 repetitions a day, or as directed. This will help strengthen your pelvic muscles and improve bladder control.  Train your bladder:  Go to the bathroom at set times, such as every 2 hours, even if you do not feel the urge to go. You can also try to hold your urine when you feel the urge to go. For example, hold your urine for 5 minutes when you feel the urge to go. As that becomes easier, hold your urine for 10 minutes.   Self-care:   Keep a UI record.  Write down how often you leak urine and how much you leak. Make a note of what you were doing when you leaked urine.  Drink liquids as directed. You may need to limit the amount of liquid you drink to help control your urine leakage. Do not drink any liquid right before you go to bed. Limit or do not have drinks that contain caffeine or alcohol.   Prevent constipation.  Eat a variety of high-fiber foods. Good examples are high-fiber cereals, beans, vegetables, and whole-grain breads. Walking is the best way to trigger  your intestines to have a bowel movement.  Exercise regularly and maintain a healthy weight.  Weight loss and exercise will decrease pressure on your bladder and help you control your leakage.   Use a catheter as directed  to help empty your bladder. A catheter is a tiny, plastic tube that is put into your bladder to drain your urine.   Go to behavior therapy as directed.  Behavior therapy may be used to help you learn to control your urge to urinate.     © Copyright [employer] 2018 Information is for End User's use only and may not be sold, redistributed or otherwise used for commercial purposes. All illustrations and images included in CareNotes® are the copyrighted property of [employer]. or [employer]      Medicare Preventive Visit Patient Instructions  Thank you for completing your Welcome to Medicare Visit or Medicare Annual Wellness Visit today. Your next wellness visit will be due in one year (1/25/2025).  The screening/preventive services that you may require over the next 5-10 years are detailed below. Some tests may not apply to you based off risk factors and/or age. Screening tests ordered at today's visit but not completed yet may show as past due. Also, please note that scanned in results may not display below.  Preventive Screenings:  Service Recommendations Previous Testing/Comments   Colorectal Cancer Screening  * Colonoscopy    * Fecal Occult Blood Test (FOBT)/Fecal Immunochemical Test (FIT)  * Fecal DNA/Cologuard Test  * Flexible Sigmoidoscopy Age: 45-75 years old   Colonoscopy: every 10 years (may be performed more frequently if at higher risk)  OR  FOBT/FIT: every 1 year  OR  Cologuard: every 3 years  OR  Sigmoidoscopy: every 5 years  Screening may be recommended earlier than age 45 if at higher risk for colorectal cancer. Also, an individualized decision between you and your healthcare provider will decide whether screening between the ages of 76-85 would be appropriate.  Colonoscopy: 07/10/2023  FOBT/FIT: 07/10/2023  Cologuard: 07/10/2023  Sigmoidoscopy: 07/10/2023    Risks and Benefits Discussed  Screening Current     Breast Cancer Screening Age: 40+ years old  Frequency: every 1-2 years  Not required if history of left and right mastectomy Mammogram: 03/21/2023    Screening Current  Risks and Benefits Discussed   Cervical Cancer Screening Between the ages of 21-29, pap smear recommended once every 3 years.   Between the ages of 30-65, can perform pap smear with HPV co-testing every 5 years.   Recommendations may differ for women with a history of total hysterectomy, cervical cancer, or abnormal pap smears in past. Pap Smear: 10/19/2023    Screening Not Indicated  Risks and Benefits Discussed  Screening Current   Hepatitis C Screening Once for adults born between 1945 and 1965  More frequently in patients at high risk for Hepatitis C Hep C Antibody: Not on file    Risks and Benefits Discussed  Patient Declines   Diabetes Screening 1-2 times per year if you're at risk for diabetes or have pre-diabetes Fasting glucose: 93 mg/dL (11/21/2022)  A1C: No results in last 5 years (No results in last 5 years)  Risks and Benefits Discussed  Screening Current   Cholesterol Screening Once every 5 years if you don't have a lipid disorder. May order more often based on risk factors. Lipid panel: 11/21/2022    Screening Current  Risks and Benefits Discussed     Other Preventive Screenings Covered by Medicare:  Abdominal Aortic Aneurysm (AAA) Screening: covered once if your at risk. You're considered to be at risk if you have a family history of AAA.  Lung Cancer Screening: covers low dose CT scan once per year if you meet all of the following conditions: (1) Age 55-77; (2) No signs or symptoms of lung cancer; (3) Current smoker or have quit smoking within the last 15 years; (4) You have a tobacco smoking history of at least 20 pack years (packs per day multiplied by number of years you smoked);  (5) You get a written order from a healthcare provider.  Glaucoma Screening: covered annually if you're considered high risk: (1) You have diabetes OR (2) Family history of glaucoma OR (3)  aged 50 and older OR (4)  American aged 65 and older  Osteoporosis Screening: covered every 2 years if you meet one of the following conditions: (1) You're estrogen deficient and at risk for osteoporosis based off medical history and other findings; (2) Have a vertebral abnormality; (3) On glucocorticoid therapy for more than 3 months; (4) Have primary hyperparathyroidism; (5) On osteoporosis medications and need to assess response to drug therapy.   Last bone density test (DXA Scan): 03/21/2023.  HIV Screening: covered annually if you're between the age of 15-65. Also covered annually if you are younger than 15 and older than 65 with risk factors for HIV infection. For pregnant patients, it is covered up to 3 times per pregnancy.    Immunizations:  Immunization Recommendations   Influenza Vaccine Annual influenza vaccination during flu season is recommended for all persons aged >= 6 months who do not have contraindications   Pneumococcal Vaccine   * Pneumococcal conjugate vaccine = PCV13 (Prevnar 13), PCV15 (Vaxneuvance), PCV20 (Prevnar 20)  * Pneumococcal polysaccharide vaccine = PPSV23 (Pneumovax) Adults 19-65 yo with certain risk factors or if 65+ yo  If never received any pneumonia vaccine: recommend Prevnar 20 (PCV20)  Give PCV20 if previously received 1 dose of PCV13 or PPSV23   Hepatitis B Vaccine 3 dose series if at intermediate or high risk (ex: diabetes, end stage renal disease, liver disease)   Respiratory syncytial virus (RSV) Vaccine - COVERED BY MEDICARE PART D  * RSVPreF3 (Arexvy) CDC recommends that adults 60 years of age and older may receive a single dose of RSV vaccine using shared clinical decision-making (SCDM)   Tetanus (Td) Vaccine - COST NOT COVERED BY MEDICARE PART B Following  completion of primary series, a booster dose should be given every 10 years to maintain immunity against tetanus. Td may also be given as tetanus wound prophylaxis.   Tdap Vaccine - COST NOT COVERED BY MEDICARE PART B Recommended at least once for all adults. For pregnant patients, recommended with each pregnancy.   Shingles Vaccine (Shingrix) - COST NOT COVERED BY MEDICARE PART B  2 shot series recommended in those 19 years and older who have or will have weakened immune systems or those 50 years and older     Health Maintenance Due:      Topic Date Due   • Hepatitis C Screening  Never done   • Colorectal Cancer Screening  12/09/2023   • Breast Cancer Screening: Mammogram  03/21/2024     Immunizations Due:      Topic Date Due   • Influenza Vaccine (1) 09/01/2023   • COVID-19 Vaccine (5 - 2023-24 season) 09/01/2023     Advance Directives   What are advance directives?  Advance directives are legal documents that state your wishes and plans for medical care. These plans are made ahead of time in case you lose your ability to make decisions for yourself. Advance directives can apply to any medical decision, such as the treatments you want, and if you want to donate organs.   What are the types of advance directives?  There are many types of advance directives, and each state has rules about how to use them. You may choose a combination of any of the following:  Living will:  This is a written record of the treatment you want. You can also choose which treatments you do not want, which to limit, and which to stop at a certain time. This includes surgery, medicine, IV fluid, and tube feedings.   Durable power of  for healthcare (DPAHC):  This is a written record that states who you want to make healthcare choices for you when you are unable to make them for yourself. This person, called a proxy, is usually a family member or a friend. You may choose more than 1 proxy.  Do not resuscitate (DNR) order:  A DNR  order is used in case your heart stops beating or you stop breathing. It is a request not to have certain forms of treatment, such as CPR. A DNR order may be included in other types of advance directives.  Medical directive:  This covers the care that you want if you are in a coma, near death, or unable to make decisions for yourself. You can list the treatments you want for each condition. Treatment may include pain medicine, surgery, blood transfusions, dialysis, IV or tube feedings, and a ventilator (breathing machine).  Values history:  This document has questions about your views, beliefs, and how you feel and think about life. This information can help others choose the care that you would choose.  Why are advance directives important?  An advance directive helps you control your care. Although spoken wishes may be used, it is better to have your wishes written down. Spoken wishes can be misunderstood, or not followed. Treatments may be given even if you do not want them. An advance directive may make it easier for your family to make difficult choices about your care.   Urinary Incontinence   Urinary incontinence (UI)  is when you lose control of your bladder. UI develops because your bladder cannot store or empty urine properly. The 3 most common types of UI are stress incontinence, urge incontinence, or both.  Medicines:   May be given to help strengthen your bladder control. Report any side effects of medication to your healthcare provider.  Do pelvic muscle exercises often:  Your pelvic muscles help you stop urinating. Squeeze these muscles tight for 5 seconds, then relax for 5 seconds. Gradually work up to squeezing for 10 seconds. Do 3 sets of 15 repetitions a day, or as directed. This will help strengthen your pelvic muscles and improve bladder control.  Train your bladder:  Go to the bathroom at set times, such as every 2 hours, even if you do not feel the urge to go. You can also try to hold your  urine when you feel the urge to go. For example, hold your urine for 5 minutes when you feel the urge to go. As that becomes easier, hold your urine for 10 minutes.   Self-care:   Keep a UI record.  Write down how often you leak urine and how much you leak. Make a note of what you were doing when you leaked urine.  Drink liquids as directed. You may need to limit the amount of liquid you drink to help control your urine leakage. Do not drink any liquid right before you go to bed. Limit or do not have drinks that contain caffeine or alcohol.   Prevent constipation.  Eat a variety of high-fiber foods. Good examples are high-fiber cereals, beans, vegetables, and whole-grain breads. Walking is the best way to trigger your intestines to have a bowel movement.  Exercise regularly and maintain a healthy weight.  Weight loss and exercise will decrease pressure on your bladder and help you control your leakage.   Use a catheter as directed  to help empty your bladder. A catheter is a tiny, plastic tube that is put into your bladder to drain your urine.   Go to behavior therapy as directed.  Behavior therapy may be used to help you learn to control your urge to urinate.     © Copyright [employer] 2018 Information is for End User's use only and may not be sold, redistributed or otherwise used for commercial purposes. All illustrations and images included in CareNotes® are the copyrighted property of [employer] or [employer]      Urinary Incontinence   AMBULATORY CARE:   Urinary incontinence (UI)  is loss of bladder control. UI develops because your bladder cannot store or empty urine properly. The 3 most common types of UI are stress incontinence, urge incontinence, or both.       Common symptoms include the following:   You feel like your bladder does not empty completely when you urinate.    You urinate often and need to urinate immediately.    You leak urine when you sleep, or you wake up with the urge to  urinate.    You leak urine when you cough, sneeze, exercise, or laugh.    Seek care immediately if:   You have severe pain.    You are confused or cannot think clearly.    You see blood in your urine.    You have pain when you urinate.    You have new or worse pain, even after treatment.    Call your doctor if:   You have a fever.    Your mouth feels dry or you have vision changes.    Your urine is cloudy or smells bad.    You have questions or concerns about your condition or care.    Treatment  may include any of the following:  Medicines  may be given to help strengthen your bladder control.    Electrical stimulation  is used to send a small amount of electrical energy to your pelvic floor muscles. This helps control your bladder function. Electrodes may be placed outside your body or in your rectum. For women, the electrodes may be placed in the vagina.    A bulking agent  may be injected into the wall of your urethra to make it thicker. This helps keep your urethra closed and decreases urine leakage.         Devices  such as a clamp, pessary, or tampon may help stop urine leaks. Ask your healthcare provider for more information about these and other devices.    Surgery  may be needed if other treatments do not work. Several types of surgery can help improve your bladder control. Ask your provider for more information about the surgery you may need.    Self-care:   Keep a UI record.  Write down how often you leak urine and how much you leak. Make a note of what you were doing when you leaked urine.    Drink liquids as directed.  Ask your healthcare provider how much liquid to drink each day and which liquids are best for you. You may need to limit the amount of liquid you drink to help control your urine leakage. Do not drink any liquid right before you go to bed. Limit or do not have drinks that contain caffeine or alcohol.    Prevent constipation.  Eat a variety of high-fiber foods. Good examples are  high-fiber cereals, beans, vegetables, and whole-grain breads. Prune juice may help make your bowel movement softer. Walking is the best way to trigger your intestines to have a bowel movement.         Exercise regularly and maintain a healthy weight.  Ask your provider how much you should weigh and about the best exercise plan for you. Weight loss and exercise will decrease pressure on your bladder and help you control your leakage. Ask your provider to help you create a weight loss plan, if needed.         Use a catheter as directed  to help empty your bladder. A catheter is a tiny, plastic tube that is put into your bladder to drain your urine. Your provider may tell you to use a catheter to prevent your bladder from getting too full and leaking urine.    Go to behavior therapy as directed.  Behavior therapy may be used to help you learn to control your urge to urinate.    Follow up with your doctor as directed:  Write down your questions so you remember to ask them during your visits.  © Copyright [employer] 2023 Information is for End User's use only and may not be sold, redistributed or otherwise used for commercial purposes.  The above information is an  only. It is not intended as medical advice for individual conditions or treatments. Talk to your doctor, nurse or pharmacist before following any medical regimen to see if it is safe and effective for you.

## 2024-01-26 ENCOUNTER — TELEPHONE (OUTPATIENT)
Dept: ADMINISTRATIVE | Facility: OTHER | Age: 69
End: 2024-01-26

## 2024-01-26 NOTE — TELEPHONE ENCOUNTER
----- Message from Grace Collier MA sent at 1/25/2024  4:15 PM EST -----  Regarding: Colon cancer screening  01/25/24 4:16 PM    Hello, our patient Kasie Kimble has had CRC: Colonoscopy completed/performed. Please assist in updating the patient chart by pulling the Care Everywhere (CE) document. The date of service is 07/10/23.     Thank you,  Grace Collier MA  [facility] [address]

## 2024-01-29 NOTE — TELEPHONE ENCOUNTER
Upon review of the In Basket request we were able to locate, review, and update the patient chart as requested for CRC: Colonoscopy. 10-1-19 and 7-10-23.    Any additional questions or concerns should be emailed to the Practice Liaisons via the appropriate education email address, please do not reply via In Basket.    Thank you  Lin Dunham

## 2024-03-06 ENCOUNTER — OFFICE VISIT (OUTPATIENT)
Dept: URGENT CARE | Facility: CLINIC | Age: 69
End: 2024-03-06
Payer: MEDICARE

## 2024-03-06 VITALS
HEIGHT: 62 IN | SYSTOLIC BLOOD PRESSURE: 110 MMHG | WEIGHT: 134.4 LBS | DIASTOLIC BLOOD PRESSURE: 66 MMHG | RESPIRATION RATE: 16 BRPM | OXYGEN SATURATION: 98 % | HEART RATE: 68 BPM | TEMPERATURE: 98.8 F | BODY MASS INDEX: 24.73 KG/M2

## 2024-03-06 DIAGNOSIS — M75.51 SUBACROMIAL BURSITIS OF RIGHT SHOULDER JOINT: Primary | ICD-10-CM

## 2024-03-06 DIAGNOSIS — S49.91XA ARM INJURY, RIGHT, INITIAL ENCOUNTER: ICD-10-CM

## 2024-03-06 PROCEDURE — G0463 HOSPITAL OUTPT CLINIC VISIT: HCPCS | Performed by: FAMILY MEDICINE

## 2024-03-06 PROCEDURE — 99213 OFFICE O/P EST LOW 20 MIN: CPT | Performed by: FAMILY MEDICINE

## 2024-03-06 NOTE — PROGRESS NOTES
[facility]        NAME: Kasie Kimble is a 68 y.o. female  : 1955    MRN: [id number]  DATE: 2024  TIME: 1:36 PM    Assessment and Plan   Subacromial bursitis of right shoulder joint [M75.51]  1. Subacromial bursitis of right shoulder joint  Ambulatory referral to Orthopedic Surgery      2. Arm injury, right, initial encounter  CANCELED: XR humerus right            Patient Instructions       Follow up with PCP in 3-5 days.  Proceed to  ER if symptoms worsen.    If tests have been performed at [facility], our office will contact you with results if changes need to be made to the care plan discussed with you at the visit.  [facility]    Chief Complaint     Chief Complaint   Patient presents with    Arm Pain     Patient reports right arm injury 1.5 months ago while lifting something heavy, has had pain ever since. Taking advil         History of Present Illness       68-year-old female presenting with right shoulder pain.  She reports beginning with pain approximately 2 months ago after moving and lifting heavy boxes.  Pain is located along the lateral aspect of her right arm states that she has difficulty with laying on her right side.  No noted relief with over-the-counter anti-inflammatories.  Denies any extremity weakness, numbness or tingling.    Arm Pain         Review of Systems   Review of Systems   Constitutional: Negative.    HENT: Negative.     Eyes: Negative.    Respiratory: Negative.     Cardiovascular: Negative.    Gastrointestinal: Negative.    Endocrine: Negative.    Genitourinary: Negative.    Musculoskeletal:  Positive for arthralgias and myalgias.   Skin: Negative.    Allergic/Immunologic: Negative.    Neurological: Negative.    Hematological: Negative.    Psychiatric/Behavioral: Negative.           Current Medications       Current Outpatient Medications:     Cholecalciferol (Vitamin D3 Ultra Strength) 125 MCG (5000 UT)  capsule, Take 5,000 Units by mouth daily, Disp: , Rfl:     cycloSPORINE (RESTASIS) 0.05 % ophthalmic emulsion, Apply 1 drop to eye every 12 (twelve) hours, Disp: , Rfl:     Multiple Vitamin (MULTIVITAMINS PO), Take 1 tablet by mouth daily, Disp: , Rfl:     Probiotic Product (PROBIOTIC ADVANCED PO), Take 1 tablet by mouth daily, Disp: , Rfl:     valACYclovir (VALTREX) 500 mg tablet, Take 1 tablet (500 mg total) by mouth daily 1 tab PO q 12 hrs x 3 days as needed, Disp: 90 tablet, Rfl: 2    Current Allergies     Allergies as of 03/06/2024 - Reviewed 03/06/2024   Allergen Reaction Noted    Fish-derived products - food allergy  08/05/2015    Iodine - food allergy Abdominal Pain and Hives 08/02/2016            The following portions of the patient's history were reviewed and updated as appropriate: allergies, current medications, past family history, past medical history, past social history, past surgical history and problem list.     Past Medical History:   Diagnosis Date    Thyroid nodule        Past Surgical History:   Procedure Laterality Date    CYST REMOVAL      hand excision of tendon cyst       Family History   Problem Relation Age of Onset    Hypertension Mother     Breast cancer Mother 93    Stroke Mother     Heart attack Father     No Known Problems Sister     No Known Problems Sister     No Known Problems Daughter     No Known Problems Maternal Grandmother     No Known Problems Maternal Grandfather     Ovarian cancer Paternal Grandmother         age unknown    No Known Problems Paternal Grandfather     Diabetes Brother     Hepatitis Brother         type C viral    Kidney disease Brother         kidney transplant    Coronary artery disease Brother     Prostate cancer Brother 74    No Known Problems Brother     Cancer Brother 60        Soft tissue sarcoma    No Known Problems Maternal Aunt     No Known Problems Maternal Aunt     No Known Problems Maternal Aunt     No Known Problems Paternal Aunt     No Known  "Problems Paternal Aunt     No Known Problems Paternal Aunt          Medications have been verified.        Objective   /66   Pulse 68   Temp 98.8 °F (37.1 °C)   Resp 16   Ht 5' 2\" (1.575 m)   Wt 61 kg (134 lb 6.4 oz)   SpO2 98%   BMI 24.58 kg/m²   No LMP recorded. Patient is postmenopausal.       Physical Exam     Physical Exam  Vitals and nursing note reviewed.   Constitutional:       Appearance: She is well-developed.   HENT:      Head: Normocephalic.      Nose: Nose normal.   Eyes:      Pupils: Pupils are equal, round, and reactive to light.   Cardiovascular:      Rate and Rhythm: Normal rate.   Pulmonary:      Effort: Pulmonary effort is normal.   Abdominal:      General: Abdomen is flat.   Musculoskeletal:      Right shoulder: Tenderness present. No swelling. Decreased range of motion.      Cervical back: Normal range of motion.   Skin:     General: Skin is warm and dry.   Neurological:      Mental Status: She is alert and oriented to person, place, and time.                   "

## 2024-03-09 ENCOUNTER — OFFICE VISIT (OUTPATIENT)
Dept: OBGYN CLINIC | Facility: CLINIC | Age: 69
End: 2024-03-09
Payer: MEDICARE

## 2024-03-09 ENCOUNTER — APPOINTMENT (OUTPATIENT)
Dept: RADIOLOGY | Age: 69
End: 2024-03-09
Payer: MEDICARE

## 2024-03-09 VITALS
BODY MASS INDEX: 24.66 KG/M2 | DIASTOLIC BLOOD PRESSURE: 84 MMHG | HEART RATE: 80 BPM | SYSTOLIC BLOOD PRESSURE: 146 MMHG | HEIGHT: 62 IN | WEIGHT: 134 LBS

## 2024-03-09 DIAGNOSIS — M25.511 ACUTE PAIN OF RIGHT SHOULDER: Primary | ICD-10-CM

## 2024-03-09 DIAGNOSIS — M25.511 ACUTE PAIN OF RIGHT SHOULDER: ICD-10-CM

## 2024-03-09 DIAGNOSIS — M75.51 SUBACROMIAL BURSITIS OF RIGHT SHOULDER JOINT: ICD-10-CM

## 2024-03-09 DIAGNOSIS — M75.81 RIGHT ROTATOR CUFF TENDINITIS: ICD-10-CM

## 2024-03-09 PROCEDURE — 73030 X-RAY EXAM OF SHOULDER: CPT

## 2024-03-09 PROCEDURE — 20610 DRAIN/INJ JOINT/BURSA W/O US: CPT | Performed by: PHYSICIAN ASSISTANT

## 2024-03-09 PROCEDURE — 99203 OFFICE O/P NEW LOW 30 MIN: CPT | Performed by: PHYSICIAN ASSISTANT

## 2024-03-09 RX ORDER — BUPIVACAINE HYDROCHLORIDE 2.5 MG/ML
2 INJECTION, SOLUTION INFILTRATION; PERINEURAL
Status: COMPLETED | OUTPATIENT
Start: 2024-03-09 | End: 2024-03-09

## 2024-03-09 RX ORDER — TRIAMCINOLONE ACETONIDE 40 MG/ML
80 INJECTION, SUSPENSION INTRA-ARTICULAR; INTRAMUSCULAR
Status: COMPLETED | OUTPATIENT
Start: 2024-03-09 | End: 2024-03-09

## 2024-03-09 RX ADMIN — TRIAMCINOLONE ACETONIDE 80 MG: 40 INJECTION, SUSPENSION INTRA-ARTICULAR; INTRAMUSCULAR at 07:00

## 2024-03-09 RX ADMIN — BUPIVACAINE HYDROCHLORIDE 2 ML: 2.5 INJECTION, SOLUTION INFILTRATION; PERINEURAL at 07:00

## 2024-03-09 NOTE — PROGRESS NOTES
Orthopaedic Surgery - Office Note  Kasie Kimble (68 y.o. female)   : 1955   MRN: [id number]  Encounter Date: 3/9/2024    Chief Complaint   Patient presents with    Right Shoulder - Pain         Assessment/Plan  Diagnoses and all orders for this visit:    Acute pain of right shoulder  -     XR shoulder 2+ vw right; Future  -     Ambulatory Referral to Physical Therapy; Future    Subacromial bursitis of right shoulder joint  -     Ambulatory referral to Orthopedic Surgery  -     Ambulatory Referral to Physical Therapy; Future    Right rotator cuff tendinitis  -     Ambulatory Referral to Physical Therapy; Future    Other orders  -     Large joint arthrocentesis    The diagnosis as well as treatment options were reviewed with the patient in the office today.  She was advised her symptoms are most consistent with rotator cuff tendinitis although full-thickness tear cannot be ruled out without an MRI.  Due to the fact that she has not had a specific trauma a full-thickness tear is felt unlikely at this time.  I would recommend icing the shoulder 20 minutes on 1 hour off 3 times a day.  I would recommend formal physical therapy for rotator cuff tendinitis program.  She may consider an oral anti-inflammatory such as Aleve 1 tablet twice daily with food stopping and calling if any stomach upset occurs.  The risks and benefits of a subacromial cortisone injection were reviewed at length.  Shared decision making was utilized and she elected to proceed with the injection and tolerated it well.  There were no complications.    All questions were answered in the office today.  If her symptoms do not improve with conservative care we would consider advanced imaging at the next visit     Return for Recheck in 4-6 weeks with myself.        History of Present Illness  This is a new patient with right shoulder pain.  She has had pain for approximately 7 weeks.  It began after doing a lot of lifting getting ready for a  "house move.  She cannot recall 1 specific injury to the shoulder.  She states it was just repeated motion and overhead lifting that resulted in pain the next day..  Pain is worse with overhead motion.  She went to urgent care on 3/6/2024.  She did not have x-rays at that time.  The pain is located in the lateral deltoid and does not radiate.  It is worse with overhead motion and reaching behind her back.  She states she does workout regularly and is going on a cross state bicycling trip starting next Monday in [place].    Patient is not diabetic.    Review of Systems  Pertinent items are noted in HPI.  All other systems were reviewed and are negative.    Physical Exam  /84 (BP Location: Left arm, Patient Position: Sitting, Cuff Size: Standard)   Pulse 80   Ht 5' 2\" (1.575 m)   Wt 60.8 kg (134 lb)   BMI 24.51 kg/m²   Cons: Appears well.  No apparent distress.  Psych: Alert. Oriented x3.  Mood and affect normal.  Right shoulder has no skin breakdown lesions or signs of infection.  She is nontender at the AC joint or anterior shoulder.  She has a positive impingement sign.  Active forward flexion is to 160 degrees internal rotation is painful to L3.  External rotation is to 60 with pain.  She has a positive drop arm test for pain and weakness.  Internal rotation and external rotation strength is 4+ out of 5 with mild pain.  She is neurovascular intact in the right upper extremity.  There is no Luther deformity.  Elbow exam is unremarkable.    X-rays performed in the office today independent review of 3 views show no acute fractures or dislocations.  Mild AC joint and glenohumeral changes are noted.  There is no calcific tendinitis.  Type II acromion predisposing to impingement is noted.  X-rays were reviewed from orthopedic standpoint will await official radiologist interpretation           Studies Reviewed  Urgent care notes from 3/6/2024 were reviewed by myself in the office today.    Large joint " "arthrocentesis: R subacromial bursa  Universal Protocol:  Consent: Verbal consent obtained.  Risks and benefits: risks, benefits and alternatives were discussed  Consent given by: patient  Time out: Immediately prior to procedure a \"time out\" was called to verify the correct patient, procedure, equipment, support staff and site/side marked as required.  Patient understanding: patient states understanding of the procedure being performed  Patient consent: the patient's understanding of the procedure matches consent given  Relevant documents: relevant documents present and verified  Test results: test results available and properly labeled  Site marked: the operative site was marked  Radiology Images displayed and confirmed. If images not available, report reviewed: imaging studies available  Patient identity confirmed: verbally with patient  Supporting Documentation  Indications: pain   Procedure Details  Location: shoulder - R subacromial bursa  Preparation: Patient was prepped and draped in the usual sterile fashion  Needle size: 22 G  Ultrasound guidance: no  Approach: posterior  Medications administered: 2 mL bupivacaine 0.25 %; 80 mg triamcinolone acetonide 40 mg/mL    Patient tolerance: patient tolerated the procedure well with no immediate complications  Dressing:  Sterile dressing applied        Medical, Surgical, Family, and Social History  The patient's medical history, family history, and social history, were reviewed and updated as appropriate.    Past Medical History:   Diagnosis Date    Thyroid nodule        Past Surgical History:   Procedure Laterality Date    CYST REMOVAL      hand excision of tendon cyst       Family History   Problem Relation Age of Onset    Hypertension Mother     Breast cancer Mother 93    Stroke Mother     Heart attack Father     No Known Problems Sister     No Known Problems Sister     No Known Problems Daughter     No Known Problems Maternal Grandmother     No Known Problems " Maternal Grandfather     Ovarian cancer Paternal Grandmother         age unknown    No Known Problems Paternal Grandfather     Diabetes Brother     Hepatitis Brother         type C viral    Kidney disease Brother         kidney transplant    Coronary artery disease Brother     Prostate cancer Brother 74    No Known Problems Brother     Cancer Brother 60        Soft tissue sarcoma    No Known Problems Maternal Aunt     No Known Problems Maternal Aunt     No Known Problems Maternal Aunt     No Known Problems Paternal Aunt     No Known Problems Paternal Aunt     No Known Problems Paternal Aunt        Social History     Occupational History     Employer: [employer]     Comment: Retired   Tobacco Use    Smoking status: Never    Smokeless tobacco: Never   Vaping Use    Vaping status: Never Used   Substance and Sexual Activity    Alcohol use: Yes     Alcohol/week: 2.0 standard drinks of alcohol     Types: 2 Glasses of wine per week     Comment: occasional - social on weekends    Drug use: No    Sexual activity: Not Currently       Allergies   Allergen Reactions    Fish-Derived Products - Food Allergy      Other reaction(s): flounder and rajni give her hives    Iodine - Food Allergy Abdominal Pain and Hives         Current Outpatient Medications:     Cholecalciferol (Vitamin D3 Ultra Strength) 125 MCG (5000 UT) capsule, Take 5,000 Units by mouth daily, Disp: , Rfl:     cycloSPORINE (RESTASIS) 0.05 % ophthalmic emulsion, Apply 1 drop to eye every 12 (twelve) hours, Disp: , Rfl:     Multiple Vitamin (MULTIVITAMINS PO), Take 1 tablet by mouth daily, Disp: , Rfl:     Probiotic Product (PROBIOTIC ADVANCED PO), Take 1 tablet by mouth daily, Disp: , Rfl:     valACYclovir (VALTREX) 500 mg tablet, Take 1 tablet (500 mg total) by mouth daily 1 tab PO q 12 hrs x 3 days as needed, Disp: 90 tablet, Rfl: 2      Marquise Srivastava PA-C

## 2024-03-15 ENCOUNTER — APPOINTMENT (OUTPATIENT)
Dept: LAB | Facility: HOSPITAL | Age: 69
End: 2024-03-15
Payer: MEDICARE

## 2024-03-15 DIAGNOSIS — E04.1 NONTOXIC UNINODULAR GOITER: ICD-10-CM

## 2024-03-15 LAB
T4 SERPL-MCNC: 9.58 UG/DL (ref 6.09–12.23)
TSH SERPL DL<=0.05 MIU/L-ACNC: 1.54 UIU/ML (ref 0.45–4.5)

## 2024-03-15 PROCEDURE — 84436 ASSAY OF TOTAL THYROXINE: CPT

## 2024-03-15 PROCEDURE — 36415 COLL VENOUS BLD VENIPUNCTURE: CPT

## 2024-03-15 PROCEDURE — 84443 ASSAY THYROID STIM HORMONE: CPT

## 2024-04-02 ENCOUNTER — EVALUATION (OUTPATIENT)
Dept: PHYSICAL THERAPY | Facility: CLINIC | Age: 69
End: 2024-04-02
Payer: MEDICARE

## 2024-04-02 DIAGNOSIS — M75.51 SUBACROMIAL BURSITIS OF RIGHT SHOULDER JOINT: ICD-10-CM

## 2024-04-02 DIAGNOSIS — M25.511 ACUTE PAIN OF RIGHT SHOULDER: Primary | ICD-10-CM

## 2024-04-02 DIAGNOSIS — M75.81 RIGHT ROTATOR CUFF TENDINITIS: ICD-10-CM

## 2024-04-02 PROCEDURE — 97110 THERAPEUTIC EXERCISES: CPT | Performed by: PHYSICAL THERAPIST

## 2024-04-02 PROCEDURE — 97162 PT EVAL MOD COMPLEX 30 MIN: CPT | Performed by: PHYSICAL THERAPIST

## 2024-04-02 NOTE — PROGRESS NOTES
PT Evaluation     Today's date: 2024  Patient name: Kasie Kimble  : 1955  MRN: [id number]  Referring provider: Marquise Srivastava PA*  Dx:   Encounter Diagnosis     ICD-10-CM    1. Acute pain of right shoulder  M25.511 Ambulatory Referral to Physical Therapy      2. Subacromial bursitis of right shoulder joint  M75.51 Ambulatory Referral to Physical Therapy      3. Right rotator cuff tendinitis  M75.81 Ambulatory Referral to Physical Therapy                     Assessment  Assessment details: Pt is a 68 y.o. year old female coming to outpatient PT with a diagnosis of R shoulder pain/ tendonitis. Pt presents with increased pain and TTP, decreased ROM, decreased strength, and overall decreased functional mobility. Pt would benefit from skilled PT services in order to address these deficits and reach maximum level of function. Thank you kindly for the referral!    Pt was issued a written HEP to be performed on a daily basis. Issued orange and green tband for home use.  Impairments: abnormal or restricted ROM, activity intolerance, impaired physical strength, lacks appropriate home exercise program and pain with function  Understanding of Dx/Px/POC: good   Prognosis: good    Goals  STG's ( 3-4 weeks)  1. Pt will be independent in HEP  2. Pt will have less pain with R shoulder AROM  LTG's ( 6- 8 weeks)  1. Improve FOTO score by 2-3 points  2. Pt will have decreased pain to 2/10 at worst  3. Pt will have improved R shoulder strength by 1/2 grade  4. Pt will have less pain when reaching behind her back    Plan  Patient would benefit from: skilled physical therapy and PT eval  Planned modality interventions: cryotherapy and low level laser therapy  Planned therapy interventions: manual therapy, joint mobilization, neuromuscular re-education, strengthening, stretching, therapeutic activities, functional ROM exercises, flexibility and home exercise program  Frequency: 2x week  Duration in weeks: 6  Plan  of Care beginning date: 2024  Plan of Care expiration date: 2024  Treatment plan discussed with: PTA and patient        Subjective Evaluation    History of Present Illness  Mechanism of injury: Pt reports R shoulder onset 2024. Pt was cleaning out her home to prepare for indoor painting. Pt needed to move a lot of heavy boxes. Pt notes her pain was not going away and she need to prop her arm on a pillow. Pt reports she received a R shoulder cortisone injection with good relief. Pt recently went on a bike trip and she had several days with some brief discomfort, but not chronic pain.   Pt has increased pain when reaching her back. Pt is currently avoiding lifting activities. Pt notes she needs to prop her R arm when she sleeps on her L side. Pt feels a heaviness in her R shoulder at times.    Work: retired  Hobbies; ride bike- [employer] [place] last week; hiking, skiing  Gait; no abnormalities  Patient Goals  Patient goals for therapy: decreased pain and increased strength  Patient goal: full ROM in my shoulder  Pain  At best pain ratin  At worst pain ratin  Location: R shoulder  Quality: dull ache    Social Support    Employment status: not working  Hand dominance: right      Diagnostic Tests  X-ray: normal  Treatments  Previous treatment: injection treatment        Objective     Neurological Testing     Sensation     Shoulder   Left Shoulder   Intact: light touch    Right Shoulder   Intact: Light touch    Reflexes   Left   Biceps (C5/C6): normal (2+)  Brachioradialis (C6): normal (2+)  Triceps (C7): normal (2+)    Right   Biceps (C5/C6): normal (2+)  Brachioradialis (C6): normal (2+)  Triceps (C7): normal (2+)    Active Range of Motion   Left Shoulder   Flexion: 175 degrees     Right Shoulder   Flexion: 165 degrees with pain  Abduction: WFL and with pain  External rotation 45°: 80 degrees   Internal rotation 45°: 85 degrees     Additional Active Range of Motion Details  (+) TTP R biceps tendon  insertion  (+) TTP R mid deltoid bursa  (+) impingement sign  (+) supraspinatus test- mild pain  (-) Hawkin's Jose test    Cervical ROM: WFL's tight at end ROM SB and rotation  Elbow ROM and strength; WFL's      Passive Range of Motion     Right Shoulder   Flexion: WFL and with pain  Abduction: WFL and with pain  External rotation 45°: 85 degrees   Internal rotation 45°: 85 degrees     Strength/Myotome Testing     Left Shoulder   Normal muscle strength    Right Shoulder     Planes of Motion   Flexion: 4+ (pain)   Abduction: 4+ (mild pain)   External rotation at 0°: 5 (pain)   Internal rotation at 0°: 5       Dx: R shoulder pain/ RTC tendonitis  EPOC: 5/14/24  CO-MORBIDITIES:   PERSONAL FACTORS:   Precautions: none      Manuals 4/2            R Shoulder PROM/ MFR             R GHJ mobs             R cold laser -biceps tendon             K tape V down                          Neuro Re-Ed             TB LPD             TB row             TB IR             Bilat TB ER             Prone squeeze and hold             Prone row             Prone W             Prone T                          Ther Ex             BW UBE for postural strengthening             S/l sld ER AROM             S/l shld flexion             HEP instruct 8' gr and orange tb            Body blade IR/ER                                                                               Ther Activity                                       Gait Training                                       Modalities

## 2024-04-04 ENCOUNTER — APPOINTMENT (OUTPATIENT)
Dept: LAB | Facility: HOSPITAL | Age: 69
End: 2024-04-04
Payer: MEDICARE

## 2024-04-04 ENCOUNTER — HOSPITAL ENCOUNTER (OUTPATIENT)
Dept: MAMMOGRAPHY | Facility: IMAGING CENTER | Age: 69
Discharge: HOME | End: 2024-04-04
Payer: MEDICARE

## 2024-04-04 VITALS — BODY MASS INDEX: 23.55 KG/M2 | WEIGHT: 128 LBS | HEIGHT: 62 IN

## 2024-04-04 DIAGNOSIS — Z12.31 ENCOUNTER FOR SCREENING MAMMOGRAM FOR MALIGNANT NEOPLASM OF BREAST: ICD-10-CM

## 2024-04-04 DIAGNOSIS — R19.7 DIARRHEA, UNSPECIFIED TYPE: ICD-10-CM

## 2024-04-04 LAB
ALBUMIN SERPL BCP-MCNC: 3.9 G/DL (ref 3.5–5)
ALP SERPL-CCNC: 76 U/L (ref 34–104)
ALT SERPL W P-5'-P-CCNC: 18 U/L (ref 7–52)
ANION GAP SERPL CALCULATED.3IONS-SCNC: 5 MMOL/L (ref 4–13)
AST SERPL W P-5'-P-CCNC: 17 U/L (ref 13–39)
BASOPHILS # BLD AUTO: 0.03 THOUSANDS/ÂΜL (ref 0–0.1)
BASOPHILS NFR BLD AUTO: 1 % (ref 0–1)
BILIRUB SERPL-MCNC: 1.2 MG/DL (ref 0.2–1)
BUN SERPL-MCNC: 16 MG/DL (ref 5–25)
CALCIUM SERPL-MCNC: 9 MG/DL (ref 8.4–10.2)
CHLORIDE SERPL-SCNC: 105 MMOL/L (ref 96–108)
CO2 SERPL-SCNC: 29 MMOL/L (ref 21–32)
CREAT SERPL-MCNC: 0.72 MG/DL (ref 0.6–1.3)
EOSINOPHIL # BLD AUTO: 0.26 THOUSAND/ÂΜL (ref 0–0.61)
EOSINOPHIL NFR BLD AUTO: 4 % (ref 0–6)
ERYTHROCYTE [DISTWIDTH] IN BLOOD BY AUTOMATED COUNT: 13.1 % (ref 11.6–15.1)
GFR SERPL CREATININE-BSD FRML MDRD: 87 ML/MIN/1.73SQ M
GLUCOSE P FAST SERPL-MCNC: 92 MG/DL (ref 65–99)
HCT VFR BLD AUTO: 43.7 % (ref 34.8–46.1)
HGB BLD-MCNC: 13.7 G/DL (ref 11.5–15.4)
IMM GRANULOCYTES # BLD AUTO: 0.04 THOUSAND/UL (ref 0–0.2)
IMM GRANULOCYTES NFR BLD AUTO: 1 % (ref 0–2)
LYMPHOCYTES # BLD AUTO: 1.7 THOUSANDS/ÂΜL (ref 0.6–4.47)
LYMPHOCYTES NFR BLD AUTO: 28 % (ref 14–44)
MCH RBC QN AUTO: 29.8 PG (ref 26.8–34.3)
MCHC RBC AUTO-ENTMCNC: 31.4 G/DL (ref 31.4–37.4)
MCV RBC AUTO: 95 FL (ref 82–98)
MONOCYTES # BLD AUTO: 0.46 THOUSAND/ÂΜL (ref 0.17–1.22)
MONOCYTES NFR BLD AUTO: 8 % (ref 4–12)
NEUTROPHILS # BLD AUTO: 3.58 THOUSANDS/ÂΜL (ref 1.85–7.62)
NEUTS SEG NFR BLD AUTO: 58 % (ref 43–75)
NRBC BLD AUTO-RTO: 0 /100 WBCS
PLATELET # BLD AUTO: 263 THOUSANDS/UL (ref 149–390)
PMV BLD AUTO: 9 FL (ref 8.9–12.7)
POTASSIUM SERPL-SCNC: 4.1 MMOL/L (ref 3.5–5.3)
PROT SERPL-MCNC: 6.8 G/DL (ref 6.4–8.4)
RBC # BLD AUTO: 4.59 MILLION/UL (ref 3.81–5.12)
SODIUM SERPL-SCNC: 139 MMOL/L (ref 135–147)
WBC # BLD AUTO: 6.07 THOUSAND/UL (ref 4.31–10.16)

## 2024-04-04 PROCEDURE — 80053 COMPREHEN METABOLIC PANEL: CPT

## 2024-04-04 PROCEDURE — 77063 BREAST TOMOSYNTHESIS BI: CPT

## 2024-04-04 PROCEDURE — 36415 COLL VENOUS BLD VENIPUNCTURE: CPT

## 2024-04-04 PROCEDURE — 77067 SCR MAMMO BI INCL CAD: CPT

## 2024-04-04 PROCEDURE — 85025 COMPLETE CBC W/AUTO DIFF WBC: CPT

## 2024-04-05 ENCOUNTER — APPOINTMENT (OUTPATIENT)
Dept: LAB | Facility: HOSPITAL | Age: 69
End: 2024-04-05
Payer: MEDICARE

## 2024-04-05 ENCOUNTER — OFFICE VISIT (OUTPATIENT)
Dept: PHYSICAL THERAPY | Facility: CLINIC | Age: 69
End: 2024-04-05
Payer: MEDICARE

## 2024-04-05 DIAGNOSIS — R19.7 DIARRHEA, UNSPECIFIED TYPE: ICD-10-CM

## 2024-04-05 DIAGNOSIS — M75.51 SUBACROMIAL BURSITIS OF RIGHT SHOULDER JOINT: ICD-10-CM

## 2024-04-05 DIAGNOSIS — M25.511 ACUTE PAIN OF RIGHT SHOULDER: Primary | ICD-10-CM

## 2024-04-05 DIAGNOSIS — M75.81 RIGHT ROTATOR CUFF TENDINITIS: ICD-10-CM

## 2024-04-05 PROCEDURE — 97140 MANUAL THERAPY 1/> REGIONS: CPT

## 2024-04-05 PROCEDURE — 97112 NEUROMUSCULAR REEDUCATION: CPT

## 2024-04-05 PROCEDURE — 97110 THERAPEUTIC EXERCISES: CPT

## 2024-04-05 PROCEDURE — 87505 NFCT AGENT DETECTION GI: CPT

## 2024-04-05 NOTE — PROGRESS NOTES
"Daily Note     Today's date: 2024  Patient name: Kasie Kimble  : 1955  MRN: [id number]  Referring provider: Marquise Srivastava PA*  Dx:   Encounter Diagnosis     ICD-10-CM    1. Acute pain of right shoulder  M25.511       2. Right rotator cuff tendinitis  M75.81       3. Subacromial bursitis of right shoulder joint  M75.51                      Subjective: Pt reports she is a little sore in the mid delt region.      Objective: See treatment diary below      Assessment: Tolerated treatment well w/ initial instruction of TE's per flow sheet. Pt w/ crepitus w/ scap PROM.  Patient demonstrated fatigue post treatment, exhibited good technique with therapeutic exercises, and would benefit from continued PT for con'td shld postural strengthening.  Pt tends to let her shld fall into ant position.      Plan: Continue per plan of care.  Progress treatment as tolerated.       Dx: R shoulder pain/ RTC tendonitis  EPOC: 24  CO-MORBIDITIES:   PERSONAL FACTORS:   Precautions: none      Manuals / 4/5           R Shoulder PROM/ MFR  JK 10'           R GHJ mobs             R cold laser -biceps tendon             K tape V down   2 'Jk + bicep inhib                        Neuro Re-Ed             TB LPD  GTB 15           TB row  GTB 15           TB IR  GTB 15           Bilat TB ER  OTB 15           Prone squeeze and hold  10x5\"           Prone row  10           Prone W  10           Prone T                          Ther Ex             BW UBE for postural strengthening  2'/2'           S/l sld ER AROM  1# 15           S/l shld flexion  10           HEP instruct 8' gr and orange tb            Body blade IR/ER                                                                               Ther Activity                                       Gait Training                                       Modalities                                              " no

## 2024-04-06 LAB
C COLI+JEJUNI TUF STL QL NAA+PROBE: NEGATIVE
EC STX1+STX2 GENES STL QL NAA+PROBE: NEGATIVE
SALMONELLA SP SPAO STL QL NAA+PROBE: NEGATIVE
SHIGELLA SP+EIEC IPAH STL QL NAA+PROBE: NEGATIVE

## 2024-04-09 ENCOUNTER — OFFICE VISIT (OUTPATIENT)
Dept: PHYSICAL THERAPY | Facility: CLINIC | Age: 69
End: 2024-04-09
Payer: MEDICARE

## 2024-04-09 DIAGNOSIS — M25.511 ACUTE PAIN OF RIGHT SHOULDER: Primary | ICD-10-CM

## 2024-04-09 DIAGNOSIS — M75.81 RIGHT ROTATOR CUFF TENDINITIS: ICD-10-CM

## 2024-04-09 DIAGNOSIS — M75.51 SUBACROMIAL BURSITIS OF RIGHT SHOULDER JOINT: ICD-10-CM

## 2024-04-09 PROCEDURE — 97110 THERAPEUTIC EXERCISES: CPT

## 2024-04-09 PROCEDURE — 97140 MANUAL THERAPY 1/> REGIONS: CPT

## 2024-04-09 PROCEDURE — 97112 NEUROMUSCULAR REEDUCATION: CPT

## 2024-04-09 NOTE — PROGRESS NOTES
"Daily Note     Today's date: 2024  Patient name: Kasie Kimble  : 1955  MRN: [id number]  Referring provider: Marquise Srivastava PA*  Dx:   Encounter Diagnosis     ICD-10-CM    1. Acute pain of right shoulder  M25.511       2. Right rotator cuff tendinitis  M75.81       3. Subacromial bursitis of right shoulder joint  M75.51           Start Time: 0800  Stop Time: 0845  Total time in clinic (min): 45 minutes    Subjective: Pt reports no new issues since last session. Continues to have a dull ache after use however it is not constant. Patient states she took tape off after 3 days- not sure it if was helping. Enjoys being retired and biking.      Objective: See treatment diary below      Assessment: Tolerated treatment well. Initiated PT POC as outlined below. TTP noted to mid biceps region during PROM/STM with relief noted post treatment. Patient able to progress and complete program without increase to symptom.  Patient benefits from verbal cues for upright posture and to relax shoulder during TB's with good response to improve outcomes. Applied K tape V down, assess for nv. Patient would benefit from further PT to address deficits and restore PLOF.       .      Plan: Continue per plan of care.  Progress treatment as tolerated.       Dx: R shoulder pain/ RTC tendonitis  EPOC: 24  CO-MORBIDITIES:   PERSONAL FACTORS:   Precautions: none      Manuals 4/2 4/          R Shoulder PROM/ MFR  JK 10' JM 10'          R GHJ mobs             R cold laser -biceps tendon             K tape V down   2 'Jk + bicep inhib 2' JM    + bicep inhib                          Neuro Re-Ed             TB LPD  GTB 15 GTB 15          TB row  GTB 15 GTB 15          TB IR  GTB 15 GTB 15          Bilat TB ER  OTB 15 OTB 15          Prone squeeze and hold  10x5\" 15x5\"          Prone row  10 1# x10          Prone W  10 1# x10          Prone T                          Ther Ex             BW UBE for postural strengthening  " 2'/2' 2'/2'          S/l sld ER AROM  1# 15 1# 20          S/l shld flexion  10 15          HEP instruct 8' gr and orange tb            Body blade IR/ER                                                                               Ther Activity                                       Gait Training                                       Modalities

## 2024-04-16 ENCOUNTER — OFFICE VISIT (OUTPATIENT)
Dept: PHYSICAL THERAPY | Facility: CLINIC | Age: 69
End: 2024-04-16
Payer: MEDICARE

## 2024-04-16 DIAGNOSIS — M25.511 ACUTE PAIN OF RIGHT SHOULDER: Primary | ICD-10-CM

## 2024-04-16 DIAGNOSIS — M75.81 RIGHT ROTATOR CUFF TENDINITIS: ICD-10-CM

## 2024-04-16 DIAGNOSIS — M75.51 SUBACROMIAL BURSITIS OF RIGHT SHOULDER JOINT: ICD-10-CM

## 2024-04-16 PROCEDURE — 97110 THERAPEUTIC EXERCISES: CPT

## 2024-04-16 PROCEDURE — 97140 MANUAL THERAPY 1/> REGIONS: CPT

## 2024-04-16 PROCEDURE — 97112 NEUROMUSCULAR REEDUCATION: CPT

## 2024-04-18 ENCOUNTER — OFFICE VISIT (OUTPATIENT)
Dept: PHYSICAL THERAPY | Facility: CLINIC | Age: 69
End: 2024-04-18
Payer: MEDICARE

## 2024-04-18 DIAGNOSIS — M75.81 RIGHT ROTATOR CUFF TENDINITIS: ICD-10-CM

## 2024-04-18 DIAGNOSIS — M75.51 SUBACROMIAL BURSITIS OF RIGHT SHOULDER JOINT: ICD-10-CM

## 2024-04-18 DIAGNOSIS — M25.511 ACUTE PAIN OF RIGHT SHOULDER: Primary | ICD-10-CM

## 2024-04-18 PROCEDURE — 97110 THERAPEUTIC EXERCISES: CPT | Performed by: PHYSICAL THERAPIST

## 2024-04-18 PROCEDURE — 97112 NEUROMUSCULAR REEDUCATION: CPT | Performed by: PHYSICAL THERAPIST

## 2024-04-18 PROCEDURE — 97140 MANUAL THERAPY 1/> REGIONS: CPT | Performed by: PHYSICAL THERAPIST

## 2024-04-18 NOTE — PROGRESS NOTES
"Daily Note     Today's date: 2024  Patient name: Kasie Kimble  : 1955  MRN: [id number]  Referring provider: Marquise Srivastava PA*  Dx:   Encounter Diagnosis     ICD-10-CM    1. Acute pain of right shoulder  M25.511       2. Right rotator cuff tendinitis  M75.81       3. Subacromial bursitis of right shoulder joint  M75.51                      Subjective: Pt reports her shoulder is doing well. Pt did not have any shoulder soreness after last visit. Pt denies pain currently.      Objective: See treatment diary below      Assessment: Tolerated treatment well. Patient exhibited good technique with therapeutic exercises. Pt tolerated increased resistance and overhead activities well.      Plan: Continue per plan of care. Focus on progressing strengthening.     Dx: R shoulder pain/ RTC tendonitis  EPOC: 24  CO-MORBIDITIES:   PERSONAL FACTORS:   Precautions: none      Manuals         R Shoulder PROM/ MFR  JK 10' JM 10' JK 5'                     Scap PROM/ MFR; UT MFR    Jk 5'        R cold laser -biceps tendon             K tape V down   2 'Jk + bicep inhib 2' JM    + bicep inhib    defer              10'        Neuro Re-Ed             TB LPD  GTB 15 GTB 15 GTB 20 GTB x20        TB row  GTB 15 GTB 15 GTB 20 GTB x20        TB IR  GTB 15 GTB 15 GTB 20 GTB x20        Bilat TB ER  OTB 15 OTB 15 GTB 20 gTB x20        Prone squeeze and hold  10x5\" 15x5\" SL 20x5\" SL 10x5\"        Prone row  10 1# x10 1# 15x 1# 15        Prone W  10 1# x10 1# 15x 1# x15        Prone T                          Ther Ex             BW UBE for postural strengthening  2'/2' 2'/2' 2'/2' 2'/2'        S/l sld ER AROM  1# 15 1# 20 1# 20x 2#x15        S/l shld flexion  10 15 20 1# x10        HEP instruct 8' gr and orange tb            Body blade IR/ER              Shld flexion trio 3 dir    1 lb 10x ea 1# x10 ea        Posterior capsule stretch     3x20\"        Wall push ups     x15        Flex bar statue of " "oren     Red 10\"x3                     Ther Activity             Overhead cone stacking     5 cones x2        Walking with # overhead     2# x1 lap        Gait Training                                       Modalities                                                  "

## 2024-04-22 ENCOUNTER — OFFICE VISIT (OUTPATIENT)
Dept: PHYSICAL THERAPY | Facility: CLINIC | Age: 69
End: 2024-04-22
Payer: MEDICARE

## 2024-04-22 DIAGNOSIS — M75.81 RIGHT ROTATOR CUFF TENDINITIS: ICD-10-CM

## 2024-04-22 DIAGNOSIS — M75.51 SUBACROMIAL BURSITIS OF RIGHT SHOULDER JOINT: ICD-10-CM

## 2024-04-22 DIAGNOSIS — M25.511 ACUTE PAIN OF RIGHT SHOULDER: Primary | ICD-10-CM

## 2024-04-22 PROCEDURE — 97140 MANUAL THERAPY 1/> REGIONS: CPT

## 2024-04-22 PROCEDURE — 97110 THERAPEUTIC EXERCISES: CPT

## 2024-04-22 NOTE — PROGRESS NOTES
"Daily Note     Today's date: 2024  Patient name: Kasie Kimble  : 1955  MRN: [id number]  Referring provider: Marquise Srivastava PA*  Dx:   Encounter Diagnosis     ICD-10-CM    1. Acute pain of right shoulder  M25.511       2. Right rotator cuff tendinitis  M75.81       3. Subacromial bursitis of right shoulder joint  M75.51                      Subjective: Pt reports her shoulder is doing well.    Objective: See treatment diary below      Assessment: Tolerated treatment well. Patient exhibited good technique with therapeutic exercises. Pt tolerated increased resistance and overhead activities well.      Plan: Continue per plan of care. Focus on progressing strengthening.     Dx: R shoulder pain/ RTC tendonitis  EPOC: 24  CO-MORBIDITIES:   PERSONAL FACTORS:   Precautions: none      Manuals        R Shoulder PROM/ MFR JM 10' JK 5' Wdc 5       Scap PROM/ MFR; UT MFR  Jk 5' Wdc 5       R cold laser -biceps tendon           K tape V down 2' JM    + bicep inhib    defer                    Total Time   10' 10'                  Neuro Re-Ed           TB LPD GTB 15 GTB 20 GTB x20 GTB 20       TB row GTB 15 GTB 20 GTB x20 GTB 20       TB IR GTB 15 GTB 20 GTB x20 GTB 20       Bilat TB ER OTB 15 GTB 20 gTB x20 GTB 20       Prone squeeze and hold 15x5\" SL 20x5\" SL 10x5\" 20       Prone row 1# x10 1# 15x 1# 15 20       Prone W 1# x10 1# 15x 1# x15 20       Prone T                      Ther Ex           BW UBE for postural strengthening 2'/2' 2'/2' 2'/2' 2'/2'       S/l sld ER AROM 1# 20 1# 20x 2#x15        S/l shld flexion 15 20 1# x10        HEP instruct           Body blade IR/ER            Shld flexion trio 3 dir  1 lb 10x ea 1# x10 ea        Posterior capsule stretch   3x20\"        Supine punches    20       Supine flexion    20       Wall push ups   x15 15x       Flex bar statue of liberty   Red 10\"x3 Red 10\" 3                  Ther Activity           Overhead cone stacking   5 " cones x2 5 cones x3       Walking with # overhead   2# x1 lap nv       Gait Training                                 Modalities

## 2024-04-23 ENCOUNTER — APPOINTMENT (OUTPATIENT)
Dept: PHYSICAL THERAPY | Facility: CLINIC | Age: 69
End: 2024-04-23
Payer: MEDICARE

## 2024-04-30 ENCOUNTER — EVALUATION (OUTPATIENT)
Dept: PHYSICAL THERAPY | Facility: CLINIC | Age: 69
End: 2024-04-30
Payer: MEDICARE

## 2024-04-30 DIAGNOSIS — M75.81 RIGHT ROTATOR CUFF TENDINITIS: ICD-10-CM

## 2024-04-30 DIAGNOSIS — M75.51 SUBACROMIAL BURSITIS OF RIGHT SHOULDER JOINT: ICD-10-CM

## 2024-04-30 DIAGNOSIS — M25.511 ACUTE PAIN OF RIGHT SHOULDER: Primary | ICD-10-CM

## 2024-04-30 PROCEDURE — 97110 THERAPEUTIC EXERCISES: CPT | Performed by: PHYSICAL THERAPIST

## 2024-04-30 PROCEDURE — 97140 MANUAL THERAPY 1/> REGIONS: CPT | Performed by: PHYSICAL THERAPIST

## 2024-04-30 PROCEDURE — 97112 NEUROMUSCULAR REEDUCATION: CPT | Performed by: PHYSICAL THERAPIST

## 2024-04-30 NOTE — PROGRESS NOTES
"Daily Note     Today's date: 2024  Patient name: Kasie Kimble  : 1955  MRN: [id number]  Referring provider: Marquise Srivastava PA*  Dx:   Encounter Diagnosis     ICD-10-CM    1. Acute pain of right shoulder  M25.511       2. Right rotator cuff tendinitis  M75.81       3. Subacromial bursitis of right shoulder joint  M75.51                      Subjective:  Pt reports her shoulder is re aggravated possibly by reaching into the back seat of her car and lifting up her purse. Pt also biked 50 miles. 7-10 throbbing pain currently.Pt was lifting weights at the gym and did not feel increase in pain on Thursday.      Objective: See treatment diary below      Assessment: Tolerated treatment fair. Patient exhibited good technique with therapeutic exercises. Pt had pain with resisted ER and when lowering a weight down. Trial of cold laser to decrease pain in mid deltoid region.      Plan: Progress note during next visit.      Dx: R shoulder pain/ RTC tendonitis  EPOC: 24  CO-MORBIDITIES:   PERSONAL FACTORS:   Precautions: none      Manuals       R Shoulder PROM/ MFR JM 10' JK 5' Wdc 5 10'      Scap PROM/ MFR; UT MFR  Jk 5' Wdc 5       R cold laser -biceps tendon     5'      K tape V down 2' JM    + bicep inhib    defer   1'                 Total Time   10' 10' 16'                 Neuro Re-Ed           TB LPD GTB 15 GTB 20 GTB x20 GTB 20 GTB x20      TB row GTB 15 GTB 20 GTB x20 GTB 20 GTB x20      TB IR GTB 15 GTB 20 GTB x20 GTB 20 GTB X20      Bilat TB ER OTB 15 GTB 20 gTB x20 GTB 20 GTB x20      Prone squeeze and hold 15x5\" SL 20x5\" SL 10x5\" 20       Prone row 1# x10 1# 15x 1# 15 20       Prone W 1# x10 1# 15x 1# x15 20       Prone T                      Ther Ex           BW UBE for postural strengthening 2'/2' 2'/2' 2'/2' 2'/2' 2'/2'      S/l sld ER AROM 1# 20 1# 20x 2#x15  2# x15      S/l shld flexion 15 20 1# x10  1# x10      HEP instruct           Body blade IR/ER      " "10\"x3      Shld flexion trio 3 dir  1 lb 10x ea 1# x10 ea        Posterior capsule stretch   3x20\"  3x20\"      Supine punches    20 2#x10      Supine flexion    20 2# x10      Wall push ups   x15 15x x15      Flex bar statue of liberty   Red 10\"x3 Red 10\" 3 Red 10\"x3                 Ther Activity           Overhead cone stacking   5 cones x2 5 cones x3       Walking with # overhead   2# x1 lap nv 2# 1 lap      Gait Training                                 Modalities                                              "

## 2024-05-02 ENCOUNTER — EVALUATION (OUTPATIENT)
Dept: PHYSICAL THERAPY | Facility: CLINIC | Age: 69
End: 2024-05-02
Payer: MEDICARE

## 2024-05-02 DIAGNOSIS — M25.511 ACUTE PAIN OF RIGHT SHOULDER: Primary | ICD-10-CM

## 2024-05-02 DIAGNOSIS — M75.51 SUBACROMIAL BURSITIS OF RIGHT SHOULDER JOINT: ICD-10-CM

## 2024-05-02 DIAGNOSIS — M75.81 RIGHT ROTATOR CUFF TENDINITIS: ICD-10-CM

## 2024-05-02 PROCEDURE — 97140 MANUAL THERAPY 1/> REGIONS: CPT | Performed by: PHYSICAL THERAPIST

## 2024-05-02 PROCEDURE — 97110 THERAPEUTIC EXERCISES: CPT | Performed by: PHYSICAL THERAPIST

## 2024-05-02 NOTE — PROGRESS NOTES
PT Re-Evaluation     Today's date: 2024  Patient name: Kasie Kimble  : 1955  MRN: [id number]  Referring provider: Marquise Srivastava PA*  Dx:   Encounter Diagnosis     ICD-10-CM    1. Acute pain of right shoulder  M25.511       2. Right rotator cuff tendinitis  M75.81       3. Subacromial bursitis of right shoulder joint  M75.51                      Assessment  Assessment details: Since starting skilled PT, pain levels are decreasing, R shoulder ROM and strength are improving with gradual functional progress. Recommend pt continue skilled PT for 4-6  more weeks.   Impairments: activity intolerance, impaired physical strength and pain with function  Understanding of Dx/Px/POC: good   Prognosis: good    Goals  STG's ( 3-4 weeks)  1. Pt will be independent in HEP- met  2. Pt will have less pain with R shoulder AROM- met  LTG's ( 6- 8 weeks)  1. Improve FOTO score by 2-3 points- NT  2. Pt will have decreased pain to 2/10 at worst- not met  3. Pt will have improved R shoulder strength by 1/2 grade- met  4. Pt will have less pain when reaching behind her back- met    Plan  Patient would benefit from: skilled physical therapy  Planned modality interventions: cryotherapy and low level laser therapy  Planned therapy interventions: manual therapy, joint mobilization, neuromuscular re-education, strengthening, stretching, therapeutic activities, functional ROM exercises, flexibility and home exercise program  Frequency: 2x week  Duration in weeks: 6  Plan of Care beginning date: 2024  Plan of Care expiration date: 2024  Treatment plan discussed with: PTA and patient        Subjective Evaluation    History of Present Illness  Mechanism of injury: I.E: Pt reports R shoulder onset 2024. Pt was cleaning out her home to prepare for indoor painting. Pt needed to move a lot of heavy boxes. Pt notes her pain was not going away and she need to prop her arm on a pillow. Pt reports she received a R  shoulder cortisone injection with good relief. Pt recently went on a bike trip and she had several days with some brief discomfort, but not chronic pain.   Pt has increased pain when reaching her back. Pt is currently avoiding lifting activities. Pt notes she needs to prop her R arm when she sleeps on her L side. Pt feels a heaviness in her R shoulder at times.    24: Pt is compliant in HEP. Pt is able to reach behind her back with no pain. Pt performing lifting with grocery bags, suitcases, and her mother's wheelchair. Pt continues to feel interment heaviness in her R shoulder.    Work: retired  Hobbies; ride bike- [facility] last week; hiking, skiing  Gait; no abnormalities  Patient Goals  Patient goals for therapy: decreased pain and increased strength  Patient goal: full ROM in my shoulder  Pain  At best pain ratin  At worst pain ratin  Location: R shoulder  Quality: dull ache    Social Support    Employment status: not working  Hand dominance: right      Diagnostic Tests  X-ray: normal  Treatments  Previous treatment: injection treatment        Objective     Neurological Testing     Sensation     Shoulder   Left Shoulder   Intact: light touch    Right Shoulder   Intact: Light touch    Reflexes   Left   Biceps (C5/C6): normal (2+)  Brachioradialis (C6): normal (2+)  Triceps (C7): normal (2+)    Right   Biceps (C5/C6): normal (2+)  Brachioradialis (C6): normal (2+)  Triceps (C7): normal (2+)    Active Range of Motion   Left Shoulder   Flexion: 175 degrees     Right Shoulder   Flexion: 172 degrees with pain  Abduction: WFL and with pain  External rotation 45°: 80 degrees   Internal rotation 45°: 85 degrees     Additional Active Range of Motion Details  (+) TTP R biceps tendon insertion  (+) TTP R mid deltoid bursa  (+) impingement sign  (+) supraspinatus test- mild pain  (-) Hawkin's Jose test    Cervical ROM: WFL's tight at end ROM SB and rotation  Elbow ROM and strength; WFL's      Passive  "Range of Motion     Right Shoulder   Flexion: WFL and with pain  Abduction: WFL  External rotation 45°: 90 degrees   Internal rotation 45°: 90 degrees     Strength/Myotome Testing     Left Shoulder   Normal muscle strength    Right Shoulder     Planes of Motion   Flexion: 5 (pain)   Abduction: 5   External rotation at 0°: 5   Internal rotation at 0°: 5       Dx: R shoulder pain/ RTC tendonitis  EPOC: 5/14/24  CO-MORBIDITIES:   PERSONAL FACTORS:   Precautions: none      Manuals 4/9 4/16 4/18 4/22 4/30 5/2     R Shoulder PROM/ MFR JM 10' JK 5' Wdc 5 10' 10'     Scap PROM/ MFR; UT MFR  Jk 5' Wdc 5       R cold laser -biceps tendon     5' 5'     K tape V down 2' JM    + bicep inhib    defer   1' 1'     Progress note      8'     Total Time   10' 10' 16' 24'                Neuro Re-Ed           TB LPD GTB 15 GTB 20 GTB x20 GTB 20 GTB x20 GTB x20     TB row GTB 15 GTB 20 GTB x20 GTB 20 GTB x20 GTB x20     TB IR GTB 15 GTB 20 GTB x20 GTB 20 GTB X20 GTB x20     Bilat TB ER OTB 15 GTB 20 gTB x20 GTB 20 GTB x20 GTB x20     Prone squeeze and hold 15x5\" SL 20x5\" SL 10x5\" 20       Prone row 1# x10 1# 15x 1# 15 20       Prone W 1# x10 1# 15x 1# x15 20       Prone T                      Ther Ex           BW UBE for postural strengthening 2'/2' 2'/2' 2'/2' 2'/2' 2'/2' 2'/2'     S/l sld ER AROM 1# 20 1# 20x 2#x15  2# x15 2# x15     S/l shld flexion 15 20 1# x10  1# x10 2# x10     HEP instruct           Body blade IR/ER      10\"x3      Shld flexion trio 3 dir  1 lb 10x ea 1# x10 ea        Posterior capsule stretch   3x20\"  3x20\"      Supine punches    20 2#x10      Supine flexion    20 2# x10      Wall push ups   x15 15x x15      Flex bar statue of liberty   Red 10\"x3 Red 10\" 3 Red 10\"x3                 Ther Activity           Overhead cone stacking   5 cones x2 5 cones x3       Walking with # overhead   2# x1 lap nv 2# 1 lap 2  # 1 lap     Gait Training                                 Modalities                                   "

## 2024-05-06 ENCOUNTER — EVALUATION (OUTPATIENT)
Dept: PHYSICAL THERAPY | Facility: CLINIC | Age: 69
End: 2024-05-06
Payer: MEDICARE

## 2024-05-06 DIAGNOSIS — M25.511 ACUTE PAIN OF RIGHT SHOULDER: Primary | ICD-10-CM

## 2024-05-06 DIAGNOSIS — M75.81 RIGHT ROTATOR CUFF TENDINITIS: ICD-10-CM

## 2024-05-06 DIAGNOSIS — M75.51 SUBACROMIAL BURSITIS OF RIGHT SHOULDER JOINT: ICD-10-CM

## 2024-05-06 PROCEDURE — 97110 THERAPEUTIC EXERCISES: CPT | Performed by: PHYSICAL THERAPIST

## 2024-05-06 PROCEDURE — 97140 MANUAL THERAPY 1/> REGIONS: CPT | Performed by: PHYSICAL THERAPIST

## 2024-05-06 NOTE — PROGRESS NOTES
"Daily Note     Today's date: 2024  Patient name: Kasie Kimble  : 1955  MRN: [id number]  Referring provider: Marquise Srivastava PA*  Dx:   Encounter Diagnosis     ICD-10-CM    1. Acute pain of right shoulder  M25.511       2. Right rotator cuff tendinitis  M75.81       3. Subacromial bursitis of right shoulder joint  M75.51                      Subjective:  Pt reports she was able to do a full work out at the gym without aggravation of symptoms. Pt felt sore in her muscles, but not sore in her shoulder joint.      Objective: See treatment diary below      Assessment: Tolerated treatment well. Patient exhibited good technique with therapeutic exercises. Pt feels relief with cold laser and KT tape. Soreness continues with manual therapy at mid deltoid bursae. Pt has pain with eccentric lowering of weight.      Plan: Continue per plan of care. Focus on strengthening and decreasing pain.         Dx: R shoulder pain/ RTC tendonitis  EPOC: 24  CO-MORBIDITIES:   PERSONAL FACTORS:   Precautions: none      Manuals  5/6    R Shoulder PROM/ MFR JM 10' JK 5' Wdc 5 10' 10' 10'    Scap PROM/ MFR; UT MFR  Jk 5' Wdc 5       R cold laser -biceps tendon     5' 5' 5    K tape V down 2' JM    + bicep inhib    defer   1' 1' 1'    Progress note      8'     Total Time   10' 10' 16' 24' 16'               Neuro Re-Ed           TB LPD GTB 15 GTB 20 GTB x20 GTB 20 GTB x20 GTB x20 GTB x20    TB row GTB 15 GTB 20 GTB x20 GTB 20 GTB x20 GTB x20 GTB x20    TB IR GTB 15 GTB 20 GTB x20 GTB 20 GTB X20 GTB x20 GTB x20    Bilat TB ER OTB 15 GTB 20 gTB x20 GTB 20 GTB x20 GTB x20 TB x20    Prone squeeze and hold 15x5\" SL 20x5\" SL 10x5\" 20       Prone row 1# x10 1# 15x 1# 15 20       Prone W 1# x10 1# 15x 1# x15 20       Prone T                      Ther Ex           BW UBE for postural strengthening 2'/2' 2'/2' 2'/2' 2'/2' 2'/2' 2'/2' 2'/2'    S/l sld ER AROM 1# 20 1# 20x 2#x15  2# x15 2# x15     S/l " "shld flexion 15 20 1# x10  1# x10 2# x10     HEP instruct           Body blade IR/ER      10\"x3      Shld flexion trio 3 dir  1 lb 10x ea 1# x10 ea        Posterior capsule stretch   3x20\"  3x20\"  3x20\"    Supine punches    20 2#x10      Supine flexion    20 2# x10      Wall push ups   x15 15x x15      Flex bar statue of liberty   Red 10\"x3 Red 10\" 3 Red 10\"x3  Red 10\"x3               Ther Activity           Overhead cone stacking   5 cones x2 5 cones x3       Walking with # overhead   2# x1 lap nv 2# 1 lap 2  # 1 lap 2#x1 lap    Gait Training                                 Modalities                                               "

## 2024-05-08 ENCOUNTER — EVALUATION (OUTPATIENT)
Dept: PHYSICAL THERAPY | Facility: CLINIC | Age: 69
End: 2024-05-08
Payer: MEDICARE

## 2024-05-08 DIAGNOSIS — M25.511 ACUTE PAIN OF RIGHT SHOULDER: Primary | ICD-10-CM

## 2024-05-08 DIAGNOSIS — M75.51 SUBACROMIAL BURSITIS OF RIGHT SHOULDER JOINT: ICD-10-CM

## 2024-05-08 DIAGNOSIS — M75.81 RIGHT ROTATOR CUFF TENDINITIS: ICD-10-CM

## 2024-05-08 PROCEDURE — 97110 THERAPEUTIC EXERCISES: CPT | Performed by: PHYSICAL THERAPIST

## 2024-05-08 PROCEDURE — 97140 MANUAL THERAPY 1/> REGIONS: CPT | Performed by: PHYSICAL THERAPIST

## 2024-05-08 PROCEDURE — 97112 NEUROMUSCULAR REEDUCATION: CPT | Performed by: PHYSICAL THERAPIST

## 2024-05-08 NOTE — PROGRESS NOTES
"Daily Note     Today's date: 2024  Patient name: Kasie Kimble  : 1955  MRN: [id number]  Referring provider: Marquise Srivastava PA*  Dx:   Encounter Diagnosis     ICD-10-CM    1. Acute pain of right shoulder  M25.511       2. Right rotator cuff tendinitis  M75.81       3. Subacromial bursitis of right shoulder joint  M75.51                      Subjective:  Pt reports her shoulder is feeling good currently. Pt did a lot of activity in preparation for traveling and had some soreness afterwards, but then it resolved.      Objective: See treatment diary below      Assessment: Tolerated treatment well. Patient exhibited good technique with therapeutic exercises. Pt tolerated increased strengthening ex well today.       Plan: Continue per plan of care. Focus on decreasing pain and improving strength.     Dx: R shoulder pain/ RTC tendonitis  EPOC: 24  CO-MORBIDITIES:   PERSONAL FACTORS:   Precautions: none      Manuals  5   R Shoulder PROM/ MFR JM 10' JK 5' Wdc 5 10' 10' 10' 8'   Scap PROM/ MFR; UT MFR  Jk 5' Wdc 5       R cold laser -biceps tendon     5' 5' 5 5'   K tape V down 2' JM    + bicep inhib    defer   1' 1' 1' 1'   Progress note      8'     Total Time   10' 10' 16' 24' 16' 14'              Neuro Re-Ed           TB LPD GTB 15 GTB 20 GTB x20 GTB 20 GTB x20 GTB x20 GTB x20 CC 44# x10   TB row GTB 15 GTB 20 GTB x20 GTB 20 GTB x20 GTB x20 GTB x20 CC 44# x10   TB IR GTB 15 GTB 20 GTB x20 GTB 20 GTB X20 GTB x20 GTB x20 GTB x20   Bilat TB ER OTB 15 GTB 20 gTB x20 GTB 20 GTB x20 GTB x20 TB x20 GTB x20   Prone squeeze and hold 15x5\" SL 20x5\" SL 10x5\" 20       Prone row 1# x10 1# 15x 1# 15 20    2# x10   Prone W 1# x10 1# 15x 1# x15 20    2# x10   Prone T                      Ther Ex           BW UBE for postural strengthening 2'/2' 2'/2' 2'/2' 2'/2' 2'/2' 2'/2' 2'/2' 2'/2'   S/l sld ER AROM 1# 20 1# 20x 2#x15  2# x15 2# x15  2# x15   S/l shld flexion 15 20 1# x10 " " 1# x10 2# x10  2# x10   HEP instruct           Body blade IR/ER      10\"x3      Shld flexion trio 3 dir  1 lb 10x ea 1# x10 ea     1# x10 ea   Posterior capsule stretch   3x20\"  3x20\"  3x20\" 3x20\"   Supine punches    20 2#x10   2# x20   Supine flexion    20 2# x10      Wall push ups   x15 15x x15   x15   Flex bar statue of liberty   Red 10\"x3 Red 10\" 3 Red 10\"x3  Red 10\"x3 Red 10\"x3              Ther Activity           Overhead cone stacking   5 cones x2 5 cones x3       Walking with # overhead   2# x1 lap nv 2# 1 lap 2  # 1 lap 2#x1 lap 2# 1 lap   Gait Training                                 Modalities                                                 "

## 2024-05-09 ENCOUNTER — APPOINTMENT (OUTPATIENT)
Dept: PHYSICAL THERAPY | Facility: CLINIC | Age: 69
End: 2024-05-09
Payer: MEDICARE

## 2024-05-21 ENCOUNTER — EVALUATION (OUTPATIENT)
Dept: PHYSICAL THERAPY | Facility: CLINIC | Age: 69
End: 2024-05-21
Payer: MEDICARE

## 2024-05-21 DIAGNOSIS — M75.81 RIGHT ROTATOR CUFF TENDINITIS: ICD-10-CM

## 2024-05-21 DIAGNOSIS — M25.511 ACUTE PAIN OF RIGHT SHOULDER: Primary | ICD-10-CM

## 2024-05-21 DIAGNOSIS — M75.51 SUBACROMIAL BURSITIS OF RIGHT SHOULDER JOINT: ICD-10-CM

## 2024-05-21 PROCEDURE — 97140 MANUAL THERAPY 1/> REGIONS: CPT | Performed by: PHYSICAL THERAPIST

## 2024-05-21 PROCEDURE — 97110 THERAPEUTIC EXERCISES: CPT | Performed by: PHYSICAL THERAPIST

## 2024-05-21 NOTE — PROGRESS NOTES
"Daily Note     Today's date: 2024  Patient name: Kasie Kimble  : 1955  MRN: [id number]  Referring provider: Marquise Srivastava PA*  Dx:   Encounter Diagnosis     ICD-10-CM    1. Acute pain of right shoulder  M25.511       2. Right rotator cuff tendinitis  M75.81       3. Subacromial bursitis of right shoulder joint  M75.51                      Subjective:  Pt reports her shoulder is feeling good. Sometimes her shoulder gets sore, but then the pain goes away. Pt has just returned from a vigorous vacation with walking, hiking and snorkeling.      Objective: See treatment diary below      Assessment: Tolerated treatment well. Patient exhibited good technique with therapeutic exercises. R shoulder ROM and strength is WFL's. No TTP R shoulder. Pt has increased tightness in R upper trap and rhomboid region. Discussed stretches to be completed at the gym.      Plan:  D/c to HEP.      Dx: R shoulder pain/ RTC tendonitis  EPOC: 24  CO-MORBIDITIES:   PERSONAL FACTORS:   Precautions: none      Manuals    R Shoulder PROM/ MFR JM 10' JK 5' Wdc 5 10' 10' 10' 8' 5'   Scap PROM/ MFR; UT MFR  Jk 5' Wdc 5     10'   R cold laser -biceps tendon     5' 5' 5 5' np   K tape V down 2' JM    + bicep inhib    defer   1' 1' 1' 1' np   Progress note      8'      Total Time   10' 10' 16' 24' 16' 14' 15'               Neuro Re-Ed            TB LPD GTB 15 GTB 20 GTB x20 GTB 20 GTB x20 GTB x20 GTB x20 CC 44# x10 CC 44# x15   TB row GTB 15 GTB 20 GTB x20 GTB 20 GTB x20 GTB x20 GTB x20 CC 44# x10 CC 44# x15   TB IR GTB 15 GTB 20 GTB x20 GTB 20 GTB X20 GTB x20 GTB x20 GTB x20 GTB x20   Bilat TB ER OTB 15 GTB 20 gTB x20 GTB 20 GTB x20 GTB x20 TB x20 GTB x20 GTB x20   Prone squeeze and hold 15x5\" SL 20x5\" SL 10x5\" 20        Prone row 1# x10 1# 15x 1# 15 20    2# x10 2# x10   Prone W 1# x10 1# 15x 1# x15 20    2# x10 2# x10   Prone T                        Ther Ex            BW UBE for " "postural strengthening 2'/2' 2'/2' 2'/2' 2'/2' 2'/2' 2'/2' 2'/2' 2'/2' 2'/2'   S/l sld ER AROM 1# 20 1# 20x 2#x15  2# x15 2# x15  2# x15 2# x20   S/l shld flexion 15 20 1# x10  1# x10 2# x10  2# x10 2# x10   HEP instruct         Reviewed stretching   Body blade IR/ER      10\"x3       Shld flexion trio 3 dir  1 lb 10x ea 1# x10 ea     1# x10 ea    Posterior capsule stretch   3x20\"  3x20\"  3x20\" 3x20\" 3x20\"   Supine punches    20 2#x10   2# x20    Supine flexion    20 2# x10       Wall push ups   x15 15x x15   x15 x15   Flex bar statue of liberty   Red 10\"x3 Red 10\" 3 Red 10\"x3  Red 10\"x3 Red 10\"x3 Red 10\"x3               Ther Activity            Overhead cone stacking   5 cones x2 5 cones x3        Walking with # overhead   2# x1 lap nv 2# 1 lap 2  # 1 lap 2#x1 lap 2# 1 lap 4# 1 lap   Gait Training                                    Modalities                                                      "

## 2024-05-23 ENCOUNTER — APPOINTMENT (OUTPATIENT)
Dept: PHYSICAL THERAPY | Facility: CLINIC | Age: 69
End: 2024-05-23
Payer: MEDICARE

## 2024-08-01 ENCOUNTER — TELEPHONE (OUTPATIENT)
Dept: OBGYN CLINIC | Facility: HOSPITAL | Age: 69
End: 2024-08-01

## 2024-08-01 DIAGNOSIS — M25.511 ACUTE PAIN OF RIGHT SHOULDER: Primary | ICD-10-CM

## 2024-08-01 DIAGNOSIS — M75.81 RIGHT ROTATOR CUFF TENDINITIS: ICD-10-CM

## 2024-08-01 DIAGNOSIS — M75.51 SUBACROMIAL BURSITIS OF RIGHT SHOULDER JOINT: ICD-10-CM

## 2024-08-01 DIAGNOSIS — M19.011 OSTEOARTHRITIS OF RIGHT AC (ACROMIOCLAVICULAR) JOINT: ICD-10-CM

## 2024-08-01 NOTE — PROGRESS NOTES
Caller: Patient     Doctor: Atif     Reason for call: Patient is scheduled to come in today at 5:00 but she saw chiropractic yesterday and they advised against getting a shot. She would like to know if you could put in a script for an MRI for her and cancel today's appt. Please call patient. Thank you.     Call back#: [phone]    Patient does not want to come into office visit and is requesting MRI.  Last seen in March.  Order will be placed, she should follow-up with orthopedic surgeon to discuss MRI results.  Patient to be made aware of the results will not be reviewed over the phone

## 2024-08-01 NOTE — TELEPHONE ENCOUNTER
Caller: Patient    Doctor: Atif    Reason for call: Patient is scheduled to come in today at 5:00 but she saw chiropractic yesterday and they advised against getting a shot. She would like to know if you could put in a script for an MRI for her and cancel today's appt. Please call patient. Thank you.    Call back#: [phone]

## 2024-08-04 ENCOUNTER — HOSPITAL ENCOUNTER (OUTPATIENT)
Dept: RADIOLOGY | Facility: HOSPITAL | Age: 69
Discharge: HOME | End: 2024-08-04
Payer: MEDICARE

## 2024-08-04 DIAGNOSIS — M19.011 OSTEOARTHRITIS OF RIGHT AC (ACROMIOCLAVICULAR) JOINT: ICD-10-CM

## 2024-08-04 DIAGNOSIS — M75.51 SUBACROMIAL BURSITIS OF RIGHT SHOULDER JOINT: ICD-10-CM

## 2024-08-04 DIAGNOSIS — M75.81 RIGHT ROTATOR CUFF TENDINITIS: ICD-10-CM

## 2024-08-04 PROCEDURE — 73221 MRI JOINT UPR EXTREM W/O DYE: CPT

## 2024-08-07 ENCOUNTER — TELEPHONE (OUTPATIENT)
Age: 69
End: 2024-08-07

## 2024-08-07 NOTE — TELEPHONE ENCOUNTER
Called and spoke w/pt and relayed PAULA Avendaño's msg to pt in detail.  Pt asked where tylenol/NSAIDs OTC.  Informed that she could take Tylenol ES 500mg 2 tabs every 8 hours as needed not to exceed 3000mg in 24 hours unless contraindicated (liver) and/or NSAIDs such as Ibuprofen,Motrin, Advil 200mg 2tabs every 6 hours as needed or Aleve 1 tablet every 12 hours as needed  unless contraindicated (kidney, blood thinners) or need to check w/PCP.   No further questions for concerns.

## 2024-08-07 NOTE — TELEPHONE ENCOUNTER
Caller: Patient     Doctor: Marquise Srivastava PA-C    Reason for call: Kasie is scheduled with Dr. Grace on 9/5/24, NP appt. She saw the results of her MRI stating she has a tear. She is scheduled to go on a 9 day bike trip traveling 40 to 60 miles a day and is asking if you think this will do more damage to her shoulder. She stated she is having pain and doesn't want to aggravate the shoulder. She is asking for your opinion.    Call back#: [phone]

## 2024-08-07 NOTE — TELEPHONE ENCOUNTER
Marquise is out today and I am covering for him.    She does have a small tear in the rotator cuff. I do not believe doing the bike trip will cause any additional damage to the shoulder but it may become somewhat aggravated from riding.    She can still do the trip if she feels up for it and take tylenol/NSAIDs for pain

## 2024-08-26 ENCOUNTER — OFFICE VISIT (OUTPATIENT)
Dept: OBGYN CLINIC | Facility: OTHER | Age: 69
End: 2024-08-26
Payer: MEDICARE

## 2024-08-26 ENCOUNTER — TELEPHONE (OUTPATIENT)
Dept: OBGYN CLINIC | Facility: CLINIC | Age: 69
End: 2024-08-26

## 2024-08-26 VITALS
DIASTOLIC BLOOD PRESSURE: 70 MMHG | HEART RATE: 66 BPM | HEIGHT: 62 IN | BODY MASS INDEX: 23.19 KG/M2 | WEIGHT: 126 LBS | SYSTOLIC BLOOD PRESSURE: 121 MMHG

## 2024-08-26 DIAGNOSIS — N30.90 CYSTITIS: Primary | ICD-10-CM

## 2024-08-26 DIAGNOSIS — M75.101 TEAR OF RIGHT SUPRASPINATUS TENDON: Primary | ICD-10-CM

## 2024-08-26 PROCEDURE — 99214 OFFICE O/P EST MOD 30 MIN: CPT | Performed by: ORTHOPAEDIC SURGERY

## 2024-08-26 RX ORDER — NITROFURANTOIN MACROCRYSTALS 50 MG/1
CAPSULE ORAL
Qty: 20 CAPSULE | Refills: 3 | Status: SHIPPED | OUTPATIENT
Start: 2024-08-26

## 2024-08-26 RX ORDER — CHLORHEXIDINE GLUCONATE ORAL RINSE 1.2 MG/ML
15 SOLUTION DENTAL ONCE
OUTPATIENT
Start: 2024-08-26 | End: 2024-08-26

## 2024-08-26 NOTE — PROGRESS NOTES
Assessment  Diagnoses and all orders for this visit:    Tear of right supraspinatus tendon    Discussion and Plan:    Explained to the patient that her MRI of the right shoulder does show a small, full thickness tear of her supraspinatus tendon. She has tried PT/HEP and a cortisone injection with only transient benefit as well as worsening symptoms/pain over the past month or so. She is a great candidate for an arthroscopic procedure to address this rotator cuff tear. She understood and all questions were answered. She wished to proceed forward with this procedure  A thorough discussion was performed with the patient regarding the risks and benefit of operative and nonoperative treatment of their rotator cuff tear.  Risks discussed include but were not limited to infection, neurovascular injury, recurrent tear, nonhealing of the repair, need for further surgery, need for biceps tenodesis or tenotomy, stiffness, need for prolonged rehabilitation, as well as the risk of anesthesia.  After this discussion all questions were answered and informed consent was obtained in the office for arthroscopic rotator cuff repair of the right shoulder.  The patient will be scheduled for this procedure accordingly.  Follow up post operatively    Subjective:   Patient ID: Kasie Kimble is a 68 y.o. female presents with a chief complaint of right shoulder pain. The pain began 5 month(s) ago and is not associated with an acute injury.  Patient reports back in March she was moving and doing repetitive lifting. The patient describes the pain as aching and dull in intensity,  intermittent, occurring with increasing frequency, awakening patient from sleep in timing, and localizes the pain to the  right subacromial joint, deltoid.  The pain is worse with overhead work, overuse, and raising arm over head and relieved by rest, ice, a previous steriod injection.  The pain is not associated with numbness and tingling.  The pain is not  "associated with constitutional symptoms. The patient is awoken at night by the pain.    The patient has had treatment in the form of PT/HEP for 2 months from March to May as well as a cortisone injection on 3/9/24 with Marquise Srivastava PA-C with benefit but her symptoms have returned and are gradually worsening    The following portions of the patient's history were reviewed and updated as appropriate: allergies, current medications, past family history, past medical history, past social history, past surgical history and problem list.    Objective:  /70 (BP Location: Right arm, Patient Position: Sitting, Cuff Size: Adult)   Pulse 66   Ht 5' 2\" (1.575 m)   Wt 57.2 kg (126 lb)   BMI 23.05 kg/m²       Right Shoulder Exam     Range of Motion   External rotation:  60   Forward flexion:  160   Internal rotation 0 degrees:  Lumbar     Muscle Strength   Abduction: 4/5     Tests   Drop arm: positive    Other   Erythema: absent  Sensation: normal  Pulse: present            Physical Exam  Vitals and nursing note reviewed.   Constitutional:       Appearance: She is well-developed.   HENT:      Head: Normocephalic and atraumatic.   Eyes:      Pupils: Pupils are equal, round, and reactive to light.   Cardiovascular:      Rate and Rhythm: Normal rate and regular rhythm.      Pulses: Normal pulses.      Heart sounds: Normal heart sounds.   Pulmonary:      Effort: Pulmonary effort is normal. No respiratory distress.      Breath sounds: Normal breath sounds.   Abdominal:      General: There is no distension.      Palpations: Abdomen is soft.   Musculoskeletal:      Cervical back: Normal range of motion and neck supple.   Skin:     General: Skin is warm and dry.   Neurological:      Mental Status: She is alert and oriented to person, place, and time.   Psychiatric:         Mood and Affect: Mood normal.         Behavior: Behavior normal.         Thought Content: Thought content normal.         Judgment: Judgment normal.       I " have personally reviewed pertinent films in PACS and my interpretation is as follows.    MRI Right Shoulder 8/4/24: Full thickness tear of the anterior distal supraspinatus tendon tear. No muscle atrophy.     X Ray Right Shoulder 3/9/24: No acute osseous abnormalities or degenerative changes.     Scribe Attestation      I,:  Neftaly Wilson am acting as a scribe while in the presence of the attending physician.:       I,:  Mitchell Grace MD personally performed the services described in this documentation    as scribed in my presence.:

## 2024-08-26 NOTE — PATIENT INSTRUCTIONS
You are being scheduled for a shoulder arthroscopy to treat your symptoms.  Below are some instructions and information on what to expect before and after your surgery.        Pre-Surgical Preparation for Arthroscopic Shoulder Surgery:     You will be contacted the evening prior to your surgery to confirm the scheduled time of the procedure and when to arrive at the hospital.   Do not eat or drink anything after midnight the night before your surgery.  Since you are having out-patient surgery, make sure that you have someone who can drive you home later in the day.  Also, prepare that person for a long day, as the process of safely preparing for and recovering from the procedure is more time consuming than the actual procedure!      As you will be in a sling after surgery, please wear or bring a loose fitting button-down shirt so that you can easily place this over the sling when you leave the surgical suite.  This avoids having to place the operative arm in a sleeve.  Most patients find that this is the easiest outfit to wear for the first week or so after surgery so you may want to plan accordingly.    Most patients find that lying down in bed after shoulder surgery accentuates their discomfort.  This is likely related to the effect of gravity on the swelling in the shoulder.  As a result, most patients sleep better in a recliner or in bed with pillows propped up behind their back for the first few days or weeks after surgery.  It is a good idea to plan for this ahead of time so there will be less hassle getting things set up the night after surgery.       What to Expect After Arthroscopic Shoulder Surgery:    It is normal to have swelling and discomfort in the shoulder for several days or a week after surgery.  It is also normal to have a small amount of drainage from the surgical wounds (especially the first few days after surgery), as we put fluid into the shoulder to visualize the structures during surgery.   "It is NOT normal to have foul smelling, purulent drainage and if this is noted, please contact the office immediately or proceed to the emergency room for evaluation as this may indicate an infection.     Applying ice bags to the shoulder may help with pain that is not controlled by the regional block.  Ice should be applied 20-30 minutes at a time, every hour or two.  Make sure to put a thin towel or T-shirt next to your skin to avoid direct contact of the ice with the skin. Icing is most helpful in the first 48 hours, although many people find that continuing past this time frame lessens their postoperative pain.  Please note that your post-operative dressing is not conductive to ice, so if you need to, it is okay to remove that dressing even the night after surgery and place band-aids over the wounds in order for the ice to take effect.     Pain Control    Most patients will receive a nerve block, the local anesthetic may keep your whole arm numb for up to 4 days.  You will be given a prescription for narcotic pain medication when you are discharged from the hospital.  With the newer nerve block that is being utilized, patients are rarely requiring the use of this narcotic pain medication.  If you find you do not tolerate that type of pain medicine well, call our office and we will try another one.     In addition to the narcotic pain medication, it is safe to use an anti-inflammatory (unless the patient has a medical condition that would not allow safe use of this mediation).  This includes the Advil, Motrin, Ibuprofen and Alleve category of medications.  Simply follow the over the counter dosing on the package and use as indicated as another adjunct.  Importantly since these medications are all very similar, use only one of them.  Tylenol is a separate medication that can be utilized as well and can be taken at the same time as the other medication or given in a \"staggered\" manner.  Just make sure that you " follow the dosing on the over the counter bottle instructions.  Also make sure that the pain medication prescribed by Dr Grace's team does not contain acetaminophen (this is found in Percocet and Vicodin).   Typically we do not prescribe those types of pain medications but if for some reason that has been prescribed DO NOT add more Tylenol (acetaminophen) as you could end up taking too much of that medication.    As mentioned above, most patients find that lying down accentuates their discomfort. You might sleep better in a recliner, or propped up in bed.     Dr. Grace encourages patients to safely ambulate around the house as much as possible in the first few days after the procedure as this can help with blood circulation in the legs. While the incidence of blood clots is very rare following shoulder surgery, early ambulation is a great way to help decrease the already low rate.    24 hours after the surgery you may remove the bandage and cover incisions with Band-Aids if needed. At that time you may shower, the wounds will have a surgical glue that will protect them from shower water but do not submerge your incisions directly (bathing or swimming) until at least 2 weeks post-operatively.  It is safe to let the arm hang at the side and take a shower and put on a shirt without the sling on.  Just make sure that you do not use the operative are to reach out and grab anything as that may damage the repair.  When you are done showering and getting dressed please return the operative arm to the sling.    Unless noted otherwise in your discharge paperwork, Dr. Grace uses absorbable sutures so they do not need to be removed.    Dr. Grace’s physician assistant (PA) will see you in the office a few days after the procedure to review the intra-operative findings and to initiate physical therapy if appropriate.  A post-operative appointment should have been scheduled for you already, but if for some reason this did  not happen, please call the office to make one.     Physical therapy is important after nearly all shoulder surgeries and a detailed rehabilitation plan based on the specific intra-operative findings and procedures will be provided to your therapist at the first post-operative office visit.  Most patients have post-operative therapy appointments scheduled pre-operatively, but if you do not, that will be handled at the first post-operative office visit.  Unless expressly directed otherwise it is safe to remove the sling even the first day after the surgery and let the arm hang by the side.  This allows patients to shower and even put a shirt on (bad arm in the sleeve first).  It is also safe to flex and extend their wrist, hand and fingers as much as possible when the block wears off.  These simple motions can serve to pump fluid out of the forearm and decrease swelling in the arm.

## 2024-09-11 ENCOUNTER — ANESTHESIA EVENT (OUTPATIENT)
Dept: PERIOP | Facility: AMBULARY SURGERY CENTER | Age: 69
End: 2024-09-11
Payer: MEDICARE

## 2024-09-16 NOTE — PRE-PROCEDURE INSTRUCTIONS
Pre-Surgery Instructions:   Medication Instructions    Cholecalciferol (Vitamin D3 Ultra Strength) 125 MCG (5000 UT) capsule Avoid 1 week prior to surgery      cycloSPORINE (RESTASIS) 0.05 % ophthalmic emulsion Take Day of Surgery; unless usually taken at night     Multiple Vitamin (MULTIVITAMINS PO) Avoid 1 week prior to surgery      nitrofurantoin (MACRODANTIN) 50 mg capsule Do not take day of surgery; continue as prescribed excluding DOS -PRN    Probiotic Product (PROBIOTIC ADVANCED PO) Avoid 1 week prior to surgery      valACYclovir (VALTREX) 500 mg tablet Do not take day of surgery; continue as prescribed excluding DOS -PRN    Medication instructions for day surgery reviewed. Please use only a sip of water to take your instructed medications. Avoid all over the counter vitamins, supplements and NSAIDS for one week prior to surgery per anesthesia guidelines. Tylenol is ok to take as needed.     You will receive a call one business day prior to surgery with an arrival time and hospital directions. If your surgery is scheduled on a Monday, the hospital will be calling you on the Friday prior to your surgery. If you have not heard from anyone by 8pm, please call the hospital supervisor through the hospital  at [phone]. ([place] [phone] or [place] [phone]).    Do not eat or drink anything after midnight the night before your surgery, including candy, mints, lifesavers, or chewing gum. Do not drink alcohol 24hrs before your surgery. Try not to smoke at least 24hrs before your surgery.       Follow the pre surgery showering instructions as listed in the “My Surgical Experience Booklet” or otherwise provided by your surgeon's office. Do not use a blade to shave the surgical area 1 week before surgery. It is okay to use a clean electric clippers up to 24 hours before surgery. Do not apply any lotions, creams, including makeup, cologne, deodorant, or perfumes after showering on the day of your  surgery. Do not use dry shampoo, hair spray, hair gel, or any type of hair products.     No contact lenses, eye make-up, or artificial eyelashes. Remove nail polish, including gel polish, and any artificial, gel, or acrylic nails if possible. Remove all jewelry including rings and body piercing jewelry.     Wear causal clothing that is easy to take on and off. Consider your type of surgery.    Keep any valuables, jewelry, piercings at home. Please bring any specially ordered equipment (sling, braces) if indicated.    Arrange for a responsible person to drive you to and from the hospital on the day of your surgery. Please confirm the visitor policy for the day of your procedure when you receive your phone call with an arrival time.     Call the surgeon's office with any new illnesses, exposures, or additional questions prior to surgery.    Please reference your “My Surgical Experience Booklet” for additional information to prepare for your upcoming surgery.

## 2024-09-24 NOTE — ANESTHESIA PREPROCEDURE EVALUATION
"Procedure:  REPAIR ROTATOR CUFF  ARTHROSCOPIC (Right: Shoulder)    Relevant Problems   ANESTHESIA (within normal limits)      CARDIO (within normal limits)      ENDO (within normal limits)      GI/HEPATIC (within normal limits)      /RENAL (within normal limits)      HEMATOLOGY (within normal limits)      NEURO/PSYCH (within normal limits)      PULMONARY (within normal limits)      Endocrine   (+) Thyroid nodule      Rheumatology   (+) Tear of right supraspinatus tendon      Other   (+) Diverticulosis of large intestine without hemorrhage      TTE 11/2021:    Left Ventricle: Left ventricular cavity size is normal. The left ventricular ejection fraction is 50%. Systolic function is low normal. Wall motion is normal. Diastolic function is mildly abnormal, consistent with grade I (abnormal) relaxation. Wall thickness is normal.    Right Ventricle: Right ventricular cavity size is normal. Systolic function is normal.    Mitral Valve: There is trace regurgitation.    Tricuspid Valve: There is trace regurgitation.    Lab Results   Component Value Date    WBC 6.07 04/04/2024    HGB 13.7 04/04/2024    HCT 43.7 04/04/2024    MCV 95 04/04/2024     04/04/2024     Lab Results   Component Value Date    SODIUM 139 04/04/2024    K 4.1 04/04/2024     04/04/2024    CO2 29 04/04/2024    BUN 16 04/04/2024    CREATININE 0.72 04/04/2024    CALCIUM 9.0 04/04/2024     No results found for: \"INR\", \"PROTIME\"  No results found for: \"HGBA1C\"       Physical Exam    Airway    Mallampati score: II  TM Distance: >3 FB  Neck ROM: full     Dental   No notable dental hx     Cardiovascular  Cardiovascular exam normal    Pulmonary  Pulmonary exam normal     Other Findings  post-pubertal.      Anesthesia Plan  ASA Score- 2     Anesthesia Type- general with ASA Monitors.         Additional Monitors:     Airway Plan: LMA.    Comment: Interscalene block for postoperative pain control.       Plan Factors-    Chart reviewed.   Existing labs " reviewed. Patient summary reviewed.                  Induction- intravenous.    Postoperative Plan-         Informed Consent- Anesthetic plan and risks discussed with patient.  I personally reviewed this patient with the CRNA. Discussed and agreed on the Anesthesia Plan with the CRNA..

## 2024-09-25 ENCOUNTER — ANESTHESIA (OUTPATIENT)
Dept: PERIOP | Facility: AMBULARY SURGERY CENTER | Age: 69
End: 2024-09-25
Payer: MEDICARE

## 2024-09-25 ENCOUNTER — HOSPITAL ENCOUNTER (OUTPATIENT)
Facility: AMBULARY SURGERY CENTER | Age: 69
Setting detail: OUTPATIENT SURGERY
Discharge: HOME | End: 2024-09-25
Attending: ORTHOPAEDIC SURGERY | Admitting: ORTHOPAEDIC SURGERY
Payer: MEDICARE

## 2024-09-25 VITALS
SYSTOLIC BLOOD PRESSURE: 136 MMHG | OXYGEN SATURATION: 94 % | HEIGHT: 62 IN | BODY MASS INDEX: 23.19 KG/M2 | RESPIRATION RATE: 16 BRPM | DIASTOLIC BLOOD PRESSURE: 65 MMHG | WEIGHT: 126 LBS | HEART RATE: 77 BPM | TEMPERATURE: 97.4 F

## 2024-09-25 DIAGNOSIS — M75.101 TEAR OF RIGHT SUPRASPINATUS TENDON: Primary | ICD-10-CM

## 2024-09-25 PROCEDURE — C1713 ANCHOR/SCREW BN/BN,TIS/BN: HCPCS | Performed by: ORTHOPAEDIC SURGERY

## 2024-09-25 PROCEDURE — 29826 SHO ARTHRS SRG DECOMPRESSION: CPT | Performed by: ORTHOPAEDIC SURGERY

## 2024-09-25 PROCEDURE — 29828 SHO ARTHRS SRG BICP TENODSIS: CPT | Performed by: ORTHOPAEDIC SURGERY

## 2024-09-25 PROCEDURE — NC001 PR NO CHARGE: Performed by: ORTHOPAEDIC SURGERY

## 2024-09-25 PROCEDURE — 29827 SHO ARTHRS SRG RT8TR CUF RPR: CPT | Performed by: ORTHOPAEDIC SURGERY

## 2024-09-25 PROCEDURE — 29826 SHO ARTHRS SRG DECOMPRESSION: CPT | Performed by: PHYSICIAN ASSISTANT

## 2024-09-25 PROCEDURE — C9290 INJ, BUPIVACAINE LIPOSOME: HCPCS | Performed by: STUDENT IN AN ORGANIZED HEALTH CARE EDUCATION/TRAINING PROGRAM

## 2024-09-25 PROCEDURE — 29827 SHO ARTHRS SRG RT8TR CUF RPR: CPT | Performed by: PHYSICIAN ASSISTANT

## 2024-09-25 PROCEDURE — 29828 SHO ARTHRS SRG BICP TENODSIS: CPT | Performed by: PHYSICIAN ASSISTANT

## 2024-09-25 DEVICE — SP FIBERTAK RC, DBLOAD TAPE BL/W, BLK/W
Type: IMPLANTABLE DEVICE | Site: SHOULDER | Status: FUNCTIONAL
Brand: ARTHREX®

## 2024-09-25 DEVICE — IMPLANTABLE DEVICE: Type: IMPLANTABLE DEVICE | Site: SHOULDER | Status: FUNCTIONAL

## 2024-09-25 RX ORDER — SODIUM CHLORIDE, SODIUM LACTATE, POTASSIUM CHLORIDE, CALCIUM CHLORIDE 600; 310; 30; 20 MG/100ML; MG/100ML; MG/100ML; MG/100ML
INJECTION, SOLUTION INTRAVENOUS CONTINUOUS PRN
Status: DISCONTINUED | OUTPATIENT
Start: 2024-09-25 | End: 2024-09-25

## 2024-09-25 RX ORDER — BUPIVACAINE HYDROCHLORIDE 5 MG/ML
INJECTION, SOLUTION EPIDURAL; INTRACAUDAL
Status: DISCONTINUED | OUTPATIENT
Start: 2024-09-25 | End: 2024-09-25

## 2024-09-25 RX ORDER — PROPOFOL 10 MG/ML
INJECTION, EMULSION INTRAVENOUS AS NEEDED
Status: DISCONTINUED | OUTPATIENT
Start: 2024-09-25 | End: 2024-09-25

## 2024-09-25 RX ORDER — FENTANYL CITRATE/PF 50 MCG/ML
25 SYRINGE (ML) INJECTION ONCE AS NEEDED
Status: DISCONTINUED | OUTPATIENT
Start: 2024-09-25 | End: 2024-09-25 | Stop reason: HOSPADM

## 2024-09-25 RX ORDER — GLYCOPYRROLATE 0.2 MG/ML
INJECTION INTRAMUSCULAR; INTRAVENOUS AS NEEDED
Status: DISCONTINUED | OUTPATIENT
Start: 2024-09-25 | End: 2024-09-25

## 2024-09-25 RX ORDER — ONDANSETRON 2 MG/ML
INJECTION INTRAMUSCULAR; INTRAVENOUS AS NEEDED
Status: DISCONTINUED | OUTPATIENT
Start: 2024-09-25 | End: 2024-09-25

## 2024-09-25 RX ORDER — EPHEDRINE SULFATE 50 MG/ML
INJECTION INTRAVENOUS AS NEEDED
Status: DISCONTINUED | OUTPATIENT
Start: 2024-09-25 | End: 2024-09-25

## 2024-09-25 RX ORDER — ONDANSETRON 2 MG/ML
4 INJECTION INTRAMUSCULAR; INTRAVENOUS ONCE AS NEEDED
Status: DISCONTINUED | OUTPATIENT
Start: 2024-09-25 | End: 2024-09-25 | Stop reason: HOSPADM

## 2024-09-25 RX ORDER — PHENYLEPHRINE HCL IN 0.9% NACL 1 MG/10 ML
SYRINGE (ML) INTRAVENOUS AS NEEDED
Status: DISCONTINUED | OUTPATIENT
Start: 2024-09-25 | End: 2024-09-25

## 2024-09-25 RX ORDER — OXYCODONE HYDROCHLORIDE 5 MG/1
5 TABLET ORAL EVERY 4 HOURS PRN
Status: DISCONTINUED | OUTPATIENT
Start: 2024-09-25 | End: 2024-09-25 | Stop reason: HOSPADM

## 2024-09-25 RX ORDER — OXYCODONE HYDROCHLORIDE 5 MG/1
5 TABLET ORAL EVERY 6 HOURS PRN
Qty: 13 TABLET | Refills: 0 | Status: SHIPPED | OUTPATIENT
Start: 2024-09-25

## 2024-09-25 RX ORDER — BUPIVACAINE HYDROCHLORIDE 2.5 MG/ML
INJECTION, SOLUTION EPIDURAL; INFILTRATION; INTRACAUDAL
Status: COMPLETED | OUTPATIENT
Start: 2024-09-25 | End: 2024-09-25

## 2024-09-25 RX ORDER — ONDANSETRON 2 MG/ML
4 INJECTION INTRAMUSCULAR; INTRAVENOUS EVERY 6 HOURS PRN
Status: CANCELLED | OUTPATIENT
Start: 2024-09-25

## 2024-09-25 RX ORDER — FENTANYL CITRATE 50 UG/ML
INJECTION, SOLUTION INTRAMUSCULAR; INTRAVENOUS
Status: COMPLETED | OUTPATIENT
Start: 2024-09-25 | End: 2024-09-25

## 2024-09-25 RX ORDER — DEXAMETHASONE SODIUM PHOSPHATE 10 MG/ML
INJECTION, SOLUTION INTRAMUSCULAR; INTRAVENOUS AS NEEDED
Status: DISCONTINUED | OUTPATIENT
Start: 2024-09-25 | End: 2024-09-25

## 2024-09-25 RX ORDER — CEFAZOLIN SODIUM 1 G/50ML
SOLUTION INTRAVENOUS AS NEEDED
Status: DISCONTINUED | OUTPATIENT
Start: 2024-09-25 | End: 2024-09-25

## 2024-09-25 RX ORDER — CHLORHEXIDINE GLUCONATE ORAL RINSE 1.2 MG/ML
15 SOLUTION DENTAL ONCE
Status: COMPLETED | OUTPATIENT
Start: 2024-09-25 | End: 2024-09-25

## 2024-09-25 RX ORDER — DIPHENHYDRAMINE HYDROCHLORIDE 50 MG/ML
12.5 INJECTION INTRAMUSCULAR; INTRAVENOUS ONCE AS NEEDED
Status: DISCONTINUED | OUTPATIENT
Start: 2024-09-25 | End: 2024-09-25 | Stop reason: HOSPADM

## 2024-09-25 RX ORDER — ACETAMINOPHEN 325 MG/1
650 TABLET ORAL EVERY 6 HOURS PRN
Status: CANCELLED | OUTPATIENT
Start: 2024-09-25

## 2024-09-25 RX ORDER — MIDAZOLAM HYDROCHLORIDE 2 MG/2ML
INJECTION, SOLUTION INTRAMUSCULAR; INTRAVENOUS
Status: COMPLETED | OUTPATIENT
Start: 2024-09-25 | End: 2024-09-25

## 2024-09-25 RX ORDER — LIDOCAINE HYDROCHLORIDE 10 MG/ML
INJECTION, SOLUTION EPIDURAL; INFILTRATION; INTRACAUDAL; PERINEURAL AS NEEDED
Status: DISCONTINUED | OUTPATIENT
Start: 2024-09-25 | End: 2024-09-25

## 2024-09-25 RX ORDER — SODIUM CHLORIDE, SODIUM LACTATE, POTASSIUM CHLORIDE, CALCIUM CHLORIDE 600; 310; 30; 20 MG/100ML; MG/100ML; MG/100ML; MG/100ML
125 INJECTION, SOLUTION INTRAVENOUS CONTINUOUS
OUTPATIENT
Start: 2024-09-25

## 2024-09-25 RX ADMIN — Medication 50 MCG: at 10:07

## 2024-09-25 RX ADMIN — SODIUM CHLORIDE, SODIUM LACTATE, POTASSIUM CHLORIDE, AND CALCIUM CHLORIDE: .6; .31; .03; .02 INJECTION, SOLUTION INTRAVENOUS at 10:18

## 2024-09-25 RX ADMIN — LIDOCAINE HYDROCHLORIDE 3 ML: 10 INJECTION, SOLUTION EPIDURAL; INFILTRATION; INTRACAUDAL; PERINEURAL at 09:21

## 2024-09-25 RX ADMIN — SODIUM CHLORIDE, SODIUM LACTATE, POTASSIUM CHLORIDE, AND CALCIUM CHLORIDE: .6; .31; .03; .02 INJECTION, SOLUTION INTRAVENOUS at 09:05

## 2024-09-25 RX ADMIN — BUPIVACAINE 15 ML: 13.3 INJECTION, SUSPENSION, LIPOSOMAL INFILTRATION at 08:51

## 2024-09-25 RX ADMIN — MIDAZOLAM 1 MG: 1 INJECTION INTRAMUSCULAR; INTRAVENOUS at 08:48

## 2024-09-25 RX ADMIN — PROPOFOL 200 MG: 10 INJECTION, EMULSION INTRAVENOUS at 09:21

## 2024-09-25 RX ADMIN — GLYCOPYRROLATE 0.2 MG: 0.2 INJECTION INTRAMUSCULAR; INTRAVENOUS at 09:22

## 2024-09-25 RX ADMIN — CEFAZOLIN SODIUM 1000 MG: 1 SOLUTION INTRAVENOUS at 09:23

## 2024-09-25 RX ADMIN — FENTANYL CITRATE 25 MCG: 50 INJECTION INTRAMUSCULAR; INTRAVENOUS at 09:28

## 2024-09-25 RX ADMIN — DEXAMETHASONE SODIUM PHOSPHATE 10 MG: 10 INJECTION, SOLUTION INTRAMUSCULAR; INTRAVENOUS at 09:21

## 2024-09-25 RX ADMIN — Medication 50 MCG: at 09:54

## 2024-09-25 RX ADMIN — CHLORHEXIDINE GLUCONATE 15 ML: 1.2 RINSE ORAL at 08:27

## 2024-09-25 RX ADMIN — FENTANYL CITRATE 25 MCG: 50 INJECTION INTRAMUSCULAR; INTRAVENOUS at 09:32

## 2024-09-25 RX ADMIN — BUPIVACAINE HYDROCHLORIDE 10 ML: 2.5 INJECTION, SOLUTION EPIDURAL; INFILTRATION; INTRACAUDAL; PERINEURAL at 08:51

## 2024-09-25 RX ADMIN — Medication 50 MCG: at 09:57

## 2024-09-25 RX ADMIN — FENTANYL CITRATE 50 MCG: 50 INJECTION INTRAMUSCULAR; INTRAVENOUS at 08:48

## 2024-09-25 RX ADMIN — EPHEDRINE SULFATE 10 MG: 50 INJECTION INTRAVENOUS at 10:11

## 2024-09-25 RX ADMIN — ONDANSETRON 4 MG: 2 INJECTION INTRAMUSCULAR; INTRAVENOUS at 09:21

## 2024-09-25 NOTE — OP NOTE
OPERATIVE REPORT  PATIENT NAME: Kasie Kimble    :  1955  MRN: [id number]  Pt Location: [facility] OR ROOM 06    SURGERY DATE: 2024     SURGEON: Mitchell Grace MD     ASSISTANT: America Catsro PA-C     NOTE: America Castro PA-C was present throughout the entire procedure and performed essential assistance with patient prepping, draping, positioning, suture management, wound closure, sterile dressing application and sling application, all under my direct supervision.     NOTE: No qualified resident physician was available for assistance    PREOPERATIVE DIAGNOSIS:  Right Shoulder Supraspinatus Tear    POSTOPERATIVE DIAGNOSIS: Same plus Long Head Biceps Tendon Partial Tear    PROCEDURES: Surgical Arthroscopy Right Shoulder with Rotator Cuff Repair, Long Head Biceps Tenodesis, and Subacromial Decompression    ANESTHESIA STAFF:  DANYA Maria MD      ANESTHESIA TYPE: General LMA with ultrasound guided interscalene block (Exparel). The interscalene block was provided by the anesthesia staff per my request for postoperative pain control and to decrease the use of postoperative narcotic medication for pain control.    COMPLICATIONS: None    FINDINGS: Supraspinatus Tear and Long Head Biceps Tendon Partial Tear    SPECIMEN(S):  None    ESTIMATED BLOOD LOSS: Minimal    INDICATION:  Briefly, the patient is a 69 y.o.  female with right shoulder pain. MRI scan confirmed a full thickness supraspinatus tear. The patient elected for arthroscopic treatment. Informed consent was obtained after a thorough discussion of the risks and benefits of the procedure, as well as alternatives to the procedure.     OPERATIVE TECHNIQUE:  On the day of surgery, I identified the patient’s right shoulder and marked it with my initials. The patient was taken to the operating room where anesthesia was induced and 2 grams of IV Cefazolin were given. The patient was examined in the supine position and was found to have full range of  motion of the right shoulder with no instability . The patient was then positioned in the semilateral LT[facility], located within [facility] position. All bony prominences were padded. The shoulder was prepped and draped in normal sterile fashion. After a time-out for safety, a standard posterolateral arthroscopic portal was made. Glenohumeral evaluation revealed intact glenohumeral articular cartilage with no loose bodies. There was a full thickness supraspinatus tear and an associated partial long head biceps tendon tear.  The full-thickness supraspinatus tear involve the entire insertion of the supraspinatus and was minimally retracted.  The infraspinatus and subscapularis were intact.  After the intra-articular evaluation was completed, the scope was then placed in the subacromial space through the same portal where a thorough bursectomy was performed. The full thickness supraspinatus tear was found to measure 1.5 cm from anterior to posterior and was able to be easily reduced to the tuberosity.  The tuberosity was prepared in routine fashion and a double row suture bridge configuration repair with one medial 2.6 mm double loaded Arthrex All-Suture anchor and two lateral 5.5 mm Aeuvumed Phantom LP anchors using four stiches through the tendon in horizontal mattress fashion was performed achieving anatomic reduction of the rotator cuff tendon to the tuberosity. The long head of biceps tendon was indicated for tenodesis and it was incorporated into the rotator cuff repair by using the anterior limb of the most anterior anchor through and around the long head of biceps in a loop whipstitch fashion; the long head of biceps was then released. When the medial row of the rotator cuff repair was tied down, this incorporated the long head biceps tenodesis into our repair. The CA ligament was frayed so it was released off the anterolateral edge of acromion and a gentle acromioplasty was performed. The area was then irrigated. Scope was  withdrawn. Wounds were closed with 4-0 Monocryl and Histoacryl. Sterile dressings and a sling with an abduction pillow was placed. The patient was awoken without complication and returned to the recovery room in good condition. We will see the patient back in the office next week to initiate therapy following standard rotator cuff repair rehabilitation protocol. At the end of procedure, the counts were correct.     PATIENT DISPOSITION:  Stable to PACU      SIGNATURE: Mitchell Grace MD  DATE: September 25, 2024  TIME: 10:31 AM

## 2024-09-25 NOTE — H&P
I identified and marked the patient in the pre-op holding area after confirming the surgical consent.  No changes to medical health since the H&P was preformed.     The patient's prescription [facility] to ensure compliance with applicable state laws.      Assessment  Diagnoses and all orders for this visit:     Tear of right supraspinatus tendon     Discussion and Plan:     Explained to the patient that her MRI of the right shoulder does show a small, full thickness tear of her supraspinatus tendon. She has tried PT/HEP and a cortisone injection with only transient benefit as well as worsening symptoms/pain over the past month or so. She is a great candidate for an arthroscopic procedure to address this rotator cuff tear. She understood and all questions were answered. She wished to proceed forward with this procedure  A thorough discussion was performed with the patient regarding the risks and benefit of operative and nonoperative treatment of their rotator cuff tear.  Risks discussed include but were not limited to infection, neurovascular injury, recurrent tear, nonhealing of the repair, need for further surgery, need for biceps tenodesis or tenotomy, stiffness, need for prolonged rehabilitation, as well as the risk of anesthesia.  After this discussion all questions were answered and informed consent was obtained in the office for arthroscopic rotator cuff repair of the right shoulder.  The patient will be scheduled for this procedure accordingly.  Follow up post operatively     Subjective:   Patient ID: Kasie Kimble is a 68 y.o. female presents with a chief complaint of right shoulder pain. The pain began 5 month(s) ago and is not associated with an acute injury.  Patient reports back in March she was moving and doing repetitive lifting. The patient describes the pain as aching and dull in intensity,  intermittent, occurring with increasing frequency,  "awakening patient from sleep in timing, and localizes the pain to the  right subacromial joint, deltoid.  The pain is worse with overhead work, overuse, and raising arm over head and relieved by rest, ice, a previous steriod injection.  The pain is not associated with numbness and tingling.  The pain is not associated with constitutional symptoms. The patient is awoken at night by the pain.    The patient has had treatment in the form of PT/HEP for 2 months from March to May as well as a cortisone injection on 3/9/24 with Marquise Srivastava PA-C with benefit but her symptoms have returned and are gradually worsening     The following portions of the patient's history were reviewed and updated as appropriate: allergies, current medications, past family history, past medical history, past social history, past surgical history and problem list.     Objective:  /72   Pulse 75   Temp 98.1 °F (36.7 °C) (Temporal)   Resp 16   Ht 5' 2\" (1.575 m)   Wt 57.2 kg (126 lb)   SpO2 99%   BMI 23.05 kg/m²           Right Shoulder Exam      Range of Motion   External rotation:  60   Forward flexion:  160   Internal rotation 0 degrees:  Lumbar      Muscle Strength   Abduction: 4/5      Tests   Drop arm: positive     Other   Erythema: absent  Sensation: normal  Pulse: present                 Physical Exam  Vitals and nursing note reviewed.   Constitutional:       Appearance: She is well-developed.   HENT:      Head: Normocephalic and atraumatic.   Eyes:      Pupils: Pupils are equal, round, and reactive to light.   Cardiovascular:      Rate and Rhythm: Normal rate and regular rhythm.      Pulses: Normal pulses.      Heart sounds: Normal heart sounds.   Pulmonary:      Effort: Pulmonary effort is normal. No respiratory distress.      Breath sounds: Normal breath sounds.   Abdominal:      General: There is no distension.      Palpations: Abdomen is soft.   Musculoskeletal:      Cervical back: Normal range of motion and neck supple. "   Skin:     General: Skin is warm and dry.   Neurological:      Mental Status: She is alert and oriented to person, place, and time.   Psychiatric:         Mood and Affect: Mood normal.         Behavior: Behavior normal.         Thought Content: Thought content normal.         Judgment: Judgment normal.         I have personally reviewed pertinent films in PACS and my interpretation is as follows.     MRI Right Shoulder 8/4/24: Full thickness tear of the anterior distal supraspinatus tendon tear. No muscle atrophy.      X Ray Right Shoulder 3/9/24: No acute osseous abnormalities or degenerative changes.

## 2024-09-25 NOTE — ANESTHESIA PROCEDURE NOTES
Peripheral Block    Patient location during procedure: holding area  Start time: 9/25/2024 8:51 AM  Reason for block: at surgeon's request and post-op pain management  Staffing  Performed by: Naveen Maria MD  Authorized by: Naveen Maria MD    Preanesthetic Checklist  Completed: patient identified, IV checked, site marked, risks and benefits discussed, surgical consent, monitors and equipment checked, pre-op evaluation and timeout performed  Peripheral Block  Patient position: supine  Prep: ChloraPrep  Patient monitoring: frequent blood pressure checks, continuous pulse oximetry and heart rate  Block type: Interscalene  Laterality: right  Injection technique: single-shot  Procedures: ultrasound guided, Ultrasound guidance required for the procedure to increase accuracy and safety of medication placement and decrease risk of complications.  Ultrasound permanent image saved  bupivacaine (PF) (MARCAINE) 0.25 % injection 20 mL - Perineural   10 mL - 9/25/2024 8:51:00 AM  fentanyl citrate (PF) 100 MCG/2ML 50 mcg - Intravenous   50 mcg - 9/25/2024 8:48:00 AM  midazolam (VERSED) injection 0.5 mg - Intravenous   1 mg - 9/25/2024 8:48:00 AM  bupivacaine liposomal (EXPAREL) 1.3 % injection 20 mL - Perineural   15 mL - 9/25/2024 8:51:00 AM  Needle  Needle type: Stimuplex   Needle gauge: 20 G  Needle length: 4 in  Needle localization: anatomical landmarks and ultrasound guidance  Assessment  Injection assessment: incremental injection, frequent aspiration, injected with ease, negative aspiration, negative for heart rate change, no paresthesia on injection, no symptoms of intraneural/intravenous injection and needle tip visualized at all times  Paresthesia pain: none  Post-procedure:  site cleaned  patient tolerated the procedure well with no immediate complications

## 2024-09-25 NOTE — ANESTHESIA POSTPROCEDURE EVALUATION
Post-Op Assessment Note    CV Status:  Stable  Pain Score: 0    Pain management: adequate       Mental Status:  Alert   Hydration Status:  Stable   PONV Controlled:  None   Airway Patency:  Patent     Post Op Vitals Reviewed: Yes    No anethesia notable event occurred.    Staff: CRNA               BP   122/74   Temp   97   Pulse  80   Resp   14   SpO2   99

## 2024-09-30 ENCOUNTER — EVALUATION (OUTPATIENT)
Dept: PHYSICAL THERAPY | Facility: CLINIC | Age: 69
End: 2024-09-30
Payer: MEDICARE

## 2024-09-30 ENCOUNTER — OFFICE VISIT (OUTPATIENT)
Dept: OBGYN CLINIC | Facility: OTHER | Age: 69
End: 2024-09-30

## 2024-09-30 VITALS
SYSTOLIC BLOOD PRESSURE: 131 MMHG | WEIGHT: 132 LBS | HEIGHT: 62 IN | BODY MASS INDEX: 24.29 KG/M2 | HEART RATE: 88 BPM | DIASTOLIC BLOOD PRESSURE: 74 MMHG

## 2024-09-30 DIAGNOSIS — Z47.89 ENCOUNTER FOR OTHER ORTHOPEDIC AFTERCARE: Primary | ICD-10-CM

## 2024-09-30 DIAGNOSIS — M75.101 TEAR OF RIGHT SUPRASPINATUS TENDON: ICD-10-CM

## 2024-09-30 DIAGNOSIS — Z98.890 S/P RIGHT ROTATOR CUFF REPAIR: Primary | ICD-10-CM

## 2024-09-30 PROCEDURE — 97162 PT EVAL MOD COMPLEX 30 MIN: CPT | Performed by: PHYSICAL THERAPIST

## 2024-09-30 PROCEDURE — 99024 POSTOP FOLLOW-UP VISIT: CPT | Performed by: PHYSICIAN ASSISTANT

## 2024-09-30 NOTE — PATIENT INSTRUCTIONS
Rotator Cuff Repair Rehabilitation Protocol  (adapted from Courtney CHAKRABORTY et al. “Rehabilitation of the Rotator Cuff: An Evaluation Based Approach”. JAAOS 2006; 14:599-609)  Updated 10.14  Mitchell Grace MD  [facility]  [address]  [place] [address]  [phone]  Phase 1 (Weeks 0-6)   Immediate Postoperative Period   Goals:    Diminish Pain and Inflammation  Maintain and Protect Integrity of the Repair    Gradually Increase Passive ROM (NO Active or Active Assist until Week 6)    Become Independent with Modified ADLs   Precautions:  Maintain Arm in Abduction Sling, Remove Only for Directed Exercises (may remove Abduction Pillow after Day 21 for comfort)    Keep Incisions Clean & Dry (okay to shower in 48 hours, band-aids over incisions)  No Immersion (pool) until Wounds Totally Sealed (usually not prior to day #10)  Passive Shoulder Motion ONLY, No Lifting/Holding Objects, Reaching Behind Back  Okay to Type/Write at Desktop with Arm in Sling   Day 0-6    Elbow, Wrist, Hand AROM Exercises     Start Cervical AROM and Scapula Isometrics    Cryotherapy/Ice for Pain and Inflammation    Instruct in Hygiene, Posture, and Positioning    Day 7-28    Continue Above  May Start Pendulum Exercises    May Start Supine, Pain Free, PT assisted PROM  Forward Flexion to 90°, External Rotation to 35° (Elbow at side), Internal Rotation to Body, Abduction to 60°    Can Introduce light Cardio (Walking, Stationary Bike)    Aqua Therapy may begin at week 3 (day 21) as long as no wound problems   Day 29-42    Continue Above  Progress PROM to Goal of full PROM by Week 6.  May add Gentle Mobilizations (GH and Scapulothoracic) to Regain full PROM if Needed.  May add Heat prior to PROM Exercises, Ice after Exercises  May Begin AAROM at Day 29 if ROM is Appropriate in Anticipation of AROM Starting at Week 6   Criteria to Progress to Phase 2    Reasonable Passive Forward Flexion, Abduction ,  IR/ER    Time  Phase 2 (Weeks 6-12)   Protection and Active Motion   Goals of Phase:    Allow Healing of Soft Tissues    Decrease Pain and Inflammation  Add ADLs and Regain AROM by End of Phase   Precautions:    NO STRENGTHENING until Phase 3    Repair is Most Prone to Failure during this Phase!    No Lifting Objects > 2 lbs (Coffee Cup OK), no Sudden Motions    Avoid Upper Extremity Bike and Ergometer   Day 43-56    Discontinue Sling  Initiate AROM Exercises (forward flexion, ER, IR and abduction), Rotator Cuff Isometrics    Continue Periscapular Exercises, add Stretching if PROM Lacking   No Strengthening until Week 12 (Minimum Time Needed for Cuff Healing Sufficient to withstand Strengthening)     Phase 3 (Weeks 12-16)   Early Strengthening   Goal of Phase:    Gain full AROM, Maintain PROM    Gradual return of Shoulder Strength, Power and Endurance    Gradual return to Functional Activities   Precautions:    No Lifting > 10lbs, Sudden Lifting or Pushing activities, Overhead Lifting    No Upper Extremity Bike or Ergometer   Week 12    Initiate Strengthening Program (10 lb Maximum until Phase 4)      ER/IR with Bands (Standing)      ER in Lateral Decubitius Position      Lateral Raises      Full Can in Scapular Plane      Prone Rowing, Horizontal Abduction, Extension      Elbow Flexion/Extension   Week 14-16    Initiate light Functional Activities as Permitted  Progress to Fundamental Shoulder Exercises  Phase 4 (Variable but Weeks 16-24)   Aggressive Rehab  Sport Specific or Activity Specific    Goals of Phase:    Maintain Full Pain-free AROM    Advanced Conditioning Exercises for enhanced Functional use    Continue regaining Shoulder Strength, Power and Endurance    Eventual return to full Functional Activities   Precautions:    None   Week 16    Continue ROM and stretching if appropriate    Progress Strengthening, Proprioceptive and Neuromuscular Training    Light Sports if Progressing Well (Chipping/Putting,  easy ground strokes etc.)   Week 20    Continue Strengthening and Stretching    Initiate Interval Sports Program as Appropriate    Note:   This is a general program which may be modified based on intra-operative findings, additional procedures preformed, repair stability, and patient biological factors.  If in doubt, please check with my office for individual patient specifics.  The ultimate goal of the surgery and rehabilitation is to get the rotator cuff to heal with the least residual functional deficit due to stiffness.  That being said, I would rather have a stiff shoulder with a healed rotator cuff repair, than a loose shoulder with a failed repair!

## 2024-09-30 NOTE — PROGRESS NOTES
PT Evaluation     Today's date: 2024  Patient name: aKsie Kimble  : 1955  MRN: [id number]  Referring provider: Mitchell Grace*  Dx:   Encounter Diagnosis     ICD-10-CM    1. Encounter for other orthopedic aftercare  Z47.89       2. Tear of right supraspinatus tendon  M75.101 Ambulatory Referral to Physical Therapy                     Assessment  Impairments: abnormal muscle tone, abnormal or restricted ROM, activity intolerance, impaired physical strength, lacks appropriate home exercise program and pain with function    Assessment details: Pt is a 69 y.o. year old female coming to outpatient PT s/p R shoulder RTC repair, biceps tenodesis and SAD on 24. Pt presents with increased pain and TTP, decreased ROM, decreased strength, and overall decreased functional mobility. Pt would benefit from skilled PT services in order to address these deficits and reach maximum level of function. Thank you kindly for the referral!    Pt was issued a written HEP to be performed on a daily basis. Therapist reviewed MD protocol and answered questions related to PT. Pt was instructed to call the office with any medication related concerns.  Understanding of Dx/Px/POC: good     Prognosis: good    Goals  STG's ( 3-4 weeks)  1. Pt will be independent in HEP  2. Pt will have improved R shoulder flex and scaption PROM to 90* in 4 weeks  LTG's ( 6- 8 weeks)  1. Improve FOTO score by 8-10 points in 6-8 weeks  2. Pt will have decreased pain to 3/10 at worst  3. Pt will have improved R shoulder AROM by 10-15*  4. Pt will be able to sleep in bed with greater ease  5. Pt will return to driving independently.    Plan  Patient would benefit from: PT eval and skilled physical therapy  Planned modality interventions: cryotherapy and TENS    Planned therapy interventions: manual therapy, joint mobilization, neuromuscular re-education, strengthening, stretching, therapeutic activities, therapeutic exercise,  functional ROM exercises, flexibility and home exercise program    Frequency: 2x week  Duration in weeks: 8  Plan of Care beginning date: 2024  Plan of Care expiration date: 2024  Treatment plan discussed with: PTA and patient        Subjective Evaluation    History of Present Illness  Mechanism of injury: Pt had skilled PT for R shoulder for RTC tendonitis and was discharged with resolution of symptoms May 2024. Pt started to experience increased R shoulder pain. MRI testing showed a full thickness supraspinatus tear and biceps tendon tear. Pt underwent RTC repair, biceps tenodesis and SAD on 24.   Pt arrives to skilled PT with her arm in a sling. Pt is limited with all functional activities involving her R UE. Pt is taking Advil and Tylenol every 3 hours. Pt has increased pain and difficulty sleeping and is currently sleeping in a reclining chair.     Work; retired  Hobbies: traveling, bike riding, exercising  Gait; no abnormalities  Patient Goals  Patient goals for therapy: decreased pain  Patient goal: full ROM; to return to weight training for my shoulders and arm  Pain  At best pain ratin  At worst pain rating: 10  Location: R shoulder  Quality: dull ache, sharp and knife-like    Social Support  Lives with: alone    Employment status: not working  Hand dominance: right      Diagnostic Tests  MRI studies: abnormal  Treatments  Previous treatment: physical therapy (prior to surgery)      Objective     Neurological Testing     Sensation     Shoulder   Left Shoulder   Intact: light touch    Right Shoulder   Intact: Light touch    Reflexes   Left   Biceps (C5/C6): normal (2+)  Brachioradialis (C6): normal (2+)  Triceps (C7): normal (2+)    Additional Neurological Details  R UE reflexes NT    Active Range of Motion     Additional Active Range of Motion Details  R shoulder AROM: NT  R elbow AROM; NT    Passive Range of Motion     Right Shoulder   Flexion: 30 degrees   Abduction: 20 degrees    External rotation 45°: -20 degrees   Internal rotation 45°: 60 degrees     Right Elbow   Flexion: 130 degrees with pain  Extension: -10 degrees with pain    Additional Passive Range of Motion Details  (+) TTP R anterior shoulder  Increased muscle tone R upper trap/ levator scap  Cervical ROM limited with SB and rotation ROM  R shoulder strength; NT             Dx: s/p R shoulder RTC repair, biceps tenodesis, SAD (9/25/24)  EPOC: 11/25/24  CO-MORBIDITIES:  PERSONAL FACTORS: wants to return to weight lifting and exercise  Precautions: see MD protocol; no active R elbow ROM flex or supination      Manuals 9/30            R shoulder PROM; flex, scap to 90*; ER to 35*             R elbow PROM                                       Neuro Re-Ed                                                                                                        Ther Ex             Pendulums: CW, CCW             R upper trap stretch             R levator scap stretch                                                                              Ther Activity                                       Gait Training                                       Modalities             Cp post tx             TENS -prn

## 2024-09-30 NOTE — PROGRESS NOTES
"Surgery: SARS w/RCR, BT, SAD on 9/25/24    S: Patient is doing well.  Denies acute post-op events. Starts therapy today.    Complaining of pain despite Tylenol and Advil.  Has been icing.    /74   Pulse 88   Ht 5' 2\" (1.575 m)   Wt 59.9 kg (132 lb)   BMI 24.14 kg/m²     O: RIGHT shoulder  Shoulder in sling  Arthroscopic portals without erythema or drainage  Moderate ecchymosis around portals and anterior chest  Good elbow, wrist and hand range of motion  Skin - warm and dry  SI  NVI    A/P: 5 days following arthroscopic right shoulder rotator cuff repair, bicep tenodesis and subacromial decompression  NWB RUE in sling x 6 weeks (Nov6th)  Intra-operative pictures were reviewed in detail  Formal physical therapy - following standard rotator cuff rehab protocol  HEP - per therapy.  For now, may start elbow/wrist/hand range of motion.  Pendulums to tolerance  Pain control - Tylenol 1000 mg every 8 hours  Ice as needed - 20 minutes on and 20 minutes off  Follow up in 8 weeks   "

## 2024-10-02 ENCOUNTER — OFFICE VISIT (OUTPATIENT)
Dept: PHYSICAL THERAPY | Facility: CLINIC | Age: 69
End: 2024-10-02
Payer: MEDICARE

## 2024-10-02 DIAGNOSIS — M75.101 TEAR OF RIGHT SUPRASPINATUS TENDON: ICD-10-CM

## 2024-10-02 DIAGNOSIS — Z47.89 ENCOUNTER FOR OTHER ORTHOPEDIC AFTERCARE: Primary | ICD-10-CM

## 2024-10-02 PROCEDURE — 97140 MANUAL THERAPY 1/> REGIONS: CPT | Performed by: PHYSICAL THERAPIST

## 2024-10-02 NOTE — PROGRESS NOTES
"Daily Note     Today's date: 10/2/2024  Patient name: Kasie Kimble  : 1955  MRN: [id number]  Referring provider: No ref. provider found  Dx:   Encounter Diagnosis     ICD-10-CM    1. Encounter for other orthopedic aftercare  Z47.89       2. Tear of right supraspinatus tendon  M75.101                      Subjective: Pt reports she is using ice for pain control. Pt is having increased pain in her lateral arm and notes that she has a lot of upper arm bruising with swelling. Pt notes her long sleeved shirt feels tighter on her R side.      Objective: See treatment diary below      Assessment: Tolerated treatment fair. Patient  had increased pain with passive shoulder flexion and scaption. ER PROM is improving. Pt has increased muscular tightness in ER upper trap region.      Plan: Continue per plan of care. Focus on improving PROM per MD protocol.     Dx: s/p R shoulder RTC repair, biceps tenodesis, SAD (24)  EPOC: 24  CO-MORBIDITIES:  PERSONAL FACTORS: wants to return to weight lifting and exercise  Precautions: see MD protocol; no active R elbow ROM flex or supination      Manuals 9/30 10/2           R shoulder PROM; flex, scap to 90*; ER to 35*  15'           R elbow PROM  5'           Cervical MFR  5'                        Neuro Re-Ed                                                                                                        Ther Ex             Pendulums: CW, CCW  10 ea           R upper trap stretch  3x20\"           R levator scap stretch  3x20\"                                                                            Ther Activity                                       Gait Training                                       Modalities             Cp post tx             TENS -prn                  "

## 2024-10-07 ENCOUNTER — OFFICE VISIT (OUTPATIENT)
Dept: PHYSICAL THERAPY | Facility: CLINIC | Age: 69
End: 2024-10-07
Payer: MEDICARE

## 2024-10-07 DIAGNOSIS — M75.101 TEAR OF RIGHT SUPRASPINATUS TENDON: ICD-10-CM

## 2024-10-07 DIAGNOSIS — Z47.89 ENCOUNTER FOR OTHER ORTHOPEDIC AFTERCARE: Primary | ICD-10-CM

## 2024-10-07 PROCEDURE — 97140 MANUAL THERAPY 1/> REGIONS: CPT | Performed by: PHYSICAL THERAPIST

## 2024-10-07 NOTE — PROGRESS NOTES
"Daily Note     Today's date: 10/7/2024  Patient name: Kasie Kimble  : 1955  MRN: [id number]  Referring provider: No ref. provider found  Dx:   Encounter Diagnosis     ICD-10-CM    1. Encounter for other orthopedic aftercare  Z47.89       2. Tear of right supraspinatus tendon  M75.101                      Subjective:  Pt reports feeling increased tightness in her R shoulder, like a tight rubber band. Pt was walking in the dark when getting out of bed to go to the bathroom, with her sling on, and bumped the sling into the door jam of her bathroom. Pt is concerned about her shoulder and the integrity of her RTC.      Objective: See treatment diary below      Assessment: Tolerated treatment well. Patient would benefit from continued PT. Pt's elbow extension improved to 0* after gentle biceps MFR. Shoulder PROM is limited in flexion and scaption. Pt is achieving ER of 35* PROM.      Plan: Continue per plan of care. Progress according to MD protocol.     Dx: s/p R shoulder RTC repair, biceps tenodesis, SAD (24)  EPOC: 24  CO-MORBIDITIES:  PERSONAL FACTORS: wants to return to weight lifting and exercise  Precautions: see MD protocol; no active R elbow ROM flex or supination      Manuals 9/30 10/2 10/7          R shoulder PROM; flex, scap to 90*; ER to 35*  15' 20'          R elbow PROM  5' 5'          Cervical MFR  5' 5'                       Neuro Re-Ed                                                                                                        Ther Ex             Pendulums: CW, CCW  10 ea 15 ea          R upper trap stretch  3x20\" HEP          R levator scap stretch  3x20\" HEP                                                                           Ther Activity                                       Gait Training                                       Modalities             Cp post tx             TENS -prn                    "

## 2024-10-09 ENCOUNTER — OFFICE VISIT (OUTPATIENT)
Dept: PHYSICAL THERAPY | Facility: CLINIC | Age: 69
End: 2024-10-09
Payer: MEDICARE

## 2024-10-09 DIAGNOSIS — Z47.89 ENCOUNTER FOR OTHER ORTHOPEDIC AFTERCARE: Primary | ICD-10-CM

## 2024-10-09 DIAGNOSIS — M75.101 TEAR OF RIGHT SUPRASPINATUS TENDON: ICD-10-CM

## 2024-10-09 PROCEDURE — 97140 MANUAL THERAPY 1/> REGIONS: CPT | Performed by: PHYSICAL THERAPIST

## 2024-10-09 NOTE — PROGRESS NOTES
"Daily Note     Today's date: 10/9/2024  Patient name: Kasie Kimble  : 1955  MRN: [id number]  Referring provider: No ref. provider found  Dx:   Encounter Diagnosis     ICD-10-CM    1. Encounter for other orthopedic aftercare  Z47.89       2. Tear of right supraspinatus tendon  M75.101                      Subjective:  Pt reports she has more pain in the morning with pain rated 6-7/10. Pt thinks she may be tensing her shoulder and biceps musculature when sleeping causing increased biceps pain. Pain rated 5/10 currently.       Objective: See treatment diary below      Assessment: Tolerated treatment well. Patient would benefit from continued PT. Shoulder flexion and scaption ROM is tight, but pt is able to achieve full elbow extension ROM.      Plan: Continue per plan of care. Focus on improving PROM per MD protocol.     Dx: s/p R shoulder RTC repair, biceps tenodesis, SAD (24)  EPOC: 24  CO-MORBIDITIES:  PERSONAL FACTORS: wants to return to weight lifting and exercise  Precautions: see MD protocol; no active R elbow ROM flex or supination      Manuals 9/30 10/2 10/7 10/9         R shoulder PROM; flex, scap to 90*; ER to 35*  15' 20' 22         R elbow PROM  5' 5' 3'         Cervical MFR  5' 5' 5'                      Neuro Re-Ed                                                                                                        Ther Ex             Pendulums: CW, CCW  10 ea 15 ea          R upper trap stretch  3x20\" HEP          R levator scap stretch  3x20\" HEP                                                                           Ther Activity                                       Gait Training                                       Modalities             Cp post tx             TENS -prn                      "

## 2024-10-14 ENCOUNTER — OFFICE VISIT (OUTPATIENT)
Dept: PHYSICAL THERAPY | Facility: CLINIC | Age: 69
End: 2024-10-14
Payer: MEDICARE

## 2024-10-14 DIAGNOSIS — M75.101 TEAR OF RIGHT SUPRASPINATUS TENDON: ICD-10-CM

## 2024-10-14 DIAGNOSIS — Z47.89 ENCOUNTER FOR OTHER ORTHOPEDIC AFTERCARE: Primary | ICD-10-CM

## 2024-10-14 PROCEDURE — 97140 MANUAL THERAPY 1/> REGIONS: CPT | Performed by: PHYSICAL THERAPIST

## 2024-10-14 NOTE — PROGRESS NOTES
"Daily Note     Today's date: 10/14/2024  Patient name: Kasie Kimble  : 1955  MRN: [id number]  Referring provider: No ref. provider found  Dx:   Encounter Diagnosis     ICD-10-CM    1. Encounter for other orthopedic aftercare  Z47.89       2. Tear of right supraspinatus tendon  M75.101                      Subjective:  Pt reports increased pain in her biceps region and increased stiffness in her elbow upon arrival to therapy. Pt is compliant in HEP      Objective: See treatment diary below      Assessment: Tolerated treatment well. Patient exhibited good technique with therapeutic exercises. Shoulder ROM improves with manual therapy with shoulder flexion PROM being close to 80* today. Bruising is gradually improving.      Plan: Continue per plan of care. Focus on improving PROM within MD protocol guidelines     Dx: s/p R shoulder RTC repair, biceps tenodesis, SAD (24)  EPOC: 24  CO-MORBIDITIES:  PERSONAL FACTORS: wants to return to weight lifting and exercise  Precautions: see MD protocol; no active R elbow ROM flex or supination      Manuals 9/30 10/2 10/7 10/9 10/14        R shoulder PROM; flex, scap to 90*; ER to 35*  15' 20' 22 18'        R elbow PROM  5' 5' 3' 2'        Cervical MFR  5' 5' 5' 5'                     Neuro Re-Ed                                                                                                        Ther Ex             Pendulums: CW, CCW  10 ea 15 ea  20 ea        R upper trap stretch  3x20\" HEP          R levator scap stretch  3x20\" HEP                                                                           Ther Activity                                       Gait Training                                       Modalities             Cp post tx             TENS -prn                        "

## 2024-10-16 ENCOUNTER — OFFICE VISIT (OUTPATIENT)
Dept: PHYSICAL THERAPY | Facility: CLINIC | Age: 69
End: 2024-10-16
Payer: MEDICARE

## 2024-10-16 DIAGNOSIS — M75.101 TEAR OF RIGHT SUPRASPINATUS TENDON: ICD-10-CM

## 2024-10-16 DIAGNOSIS — Z47.89 ENCOUNTER FOR OTHER ORTHOPEDIC AFTERCARE: Primary | ICD-10-CM

## 2024-10-16 PROCEDURE — 97140 MANUAL THERAPY 1/> REGIONS: CPT | Performed by: PHYSICAL THERAPIST

## 2024-10-16 NOTE — PROGRESS NOTES
"Daily Note     Today's date: 10/16/2024  Patient name: Kasie Kimble  : 1955  MRN: [id number]  Referring provider: No ref. provider found  Dx:   Encounter Diagnosis     ICD-10-CM    1. Encounter for other orthopedic aftercare  Z47.89       2. Tear of right supraspinatus tendon  M75.101                      Subjective:  Pt reports her biceps pain is lessening, and did not feel too much pain after last visit, despite increased stretching into further ROM last visit. Pt is compliant in her pendulum exercises 2 times daily.      Objective: See treatment diary below      Assessment: Tolerated treatment well. Patient exhibited good technique with therapeutic exercises. Pt had less pain with PROM when therapist lowers pt's arm during manual stretching. Elbow PROM is WFL's painfree.  Shoulder remains tight in flexion and scaption PROM.    Plan: Continue per plan of care. Focus on improving PROM per MD protocol     Dx: s/p R shoulder RTC repair, biceps tenodesis, SAD (24)  EPOC: 24  CO-MORBIDITIES:  PERSONAL FACTORS: wants to return to weight lifting and exercise  Precautions: see MD protocol; no active R elbow ROM flex or supination      Manuals 9/30 10/2 10/7 10/9 10/14 10/16       R shoulder PROM; flex, scap to 90*; ER to 35*  15' 20' 22 18' 20       R elbow PROM  5' 5' 3' 2' 1'       Cervical MFR  5' 5' 5' 5' 5'                    Neuro Re-Ed                                                                                                        Ther Ex             Pendulums: CW, CCW  10 ea 15 ea  20 ea 20 ea       R upper trap stretch  3x20\" HEP          R levator scap stretch  3x20\" HEP                                                                           Ther Activity                                       Gait Training                                       Modalities             Cp post tx             TENS -prn                          "

## 2024-10-23 ENCOUNTER — OFFICE VISIT (OUTPATIENT)
Dept: PHYSICAL THERAPY | Facility: CLINIC | Age: 69
End: 2024-10-23
Payer: MEDICARE

## 2024-10-23 DIAGNOSIS — Z47.89 ENCOUNTER FOR OTHER ORTHOPEDIC AFTERCARE: Primary | ICD-10-CM

## 2024-10-23 DIAGNOSIS — M75.101 TEAR OF RIGHT SUPRASPINATUS TENDON: ICD-10-CM

## 2024-10-23 PROCEDURE — 97140 MANUAL THERAPY 1/> REGIONS: CPT | Performed by: PHYSICAL THERAPIST

## 2024-10-23 NOTE — PROGRESS NOTES
"Daily Note     Today's date: 10/23/2024  Patient name: Kasie Kimble  : 1955  MRN: [id number]  Referring provider: No ref. provider found  Dx:   Encounter Diagnosis     ICD-10-CM    1. Encounter for other orthopedic aftercare  Z47.89       2. Tear of right supraspinatus tendon  M75.101                      Subjective:  Pt reports her shoulder doesn't feel too bad today. Pt has no pain at rest and 3-4/10 with manual stretching. Pt is compliant with pendulum HEP. Pt is walking on a regular basis outside.      Objective: See treatment diary below      Assessment: Tolerated treatment well. Patient  has achieved 90* of passive flexion and scaption ROM. ER PROM approx 30*. Pt's bruising in her upper arm is gradually reducing.      Plan: Continue per plan of care. Progress to AAROM NV since it will be day 30 post op.     Dx: s/p R shoulder RTC repair, biceps tenodesis, SAD (24)  EPOC: 24  CO-MORBIDITIES:  PERSONAL FACTORS: wants to return to weight lifting and exercise  Precautions: see MD protocol; no active R elbow ROM flex or supination      Manuals 9/30 10/2 10/7 10/9 10/14 10/16 10/23      R shoulder PROM; flex, scap to 90*; ER to 35*  15' 20' 22 18' 20 20      R elbow PROM  5' 5' 3' 2' 1' 1'      Cervical MFR  5' 5' 5' 5' 5' 4'                   Neuro Re-Ed                                                                                                        Ther Ex             Pendulums: CW, CCW  10 ea 15 ea  20 ea 20 ea 20 ea      R upper trap stretch  3x20\" HEP          R levator scap stretch  3x20\" HEP          Table slides: flexion & scap             Seated cane ER AAROM             Pulleys: flexion ROM                                       Ther Activity                                       Gait Training                                       Modalities             Cp post tx             TENS -prn                            "

## 2024-10-24 ENCOUNTER — APPOINTMENT (OUTPATIENT)
Dept: LAB | Facility: HOSPITAL | Age: 69
End: 2024-10-24
Payer: MEDICARE

## 2024-10-24 DIAGNOSIS — R10.9 STOMACH ACHE: ICD-10-CM

## 2024-10-24 LAB
ALBUMIN SERPL BCG-MCNC: 4 G/DL (ref 3.5–5)
ALP SERPL-CCNC: 89 U/L (ref 34–104)
ALT SERPL W P-5'-P-CCNC: 10 U/L (ref 7–52)
ANION GAP SERPL CALCULATED.3IONS-SCNC: 6 MMOL/L (ref 4–13)
AST SERPL W P-5'-P-CCNC: 16 U/L (ref 13–39)
BACTERIA UR QL AUTO: ABNORMAL /HPF
BASOPHILS # BLD AUTO: 0.05 THOUSANDS/ΜL (ref 0–0.1)
BASOPHILS NFR BLD AUTO: 1 % (ref 0–1)
BILIRUB SERPL-MCNC: 1.66 MG/DL (ref 0.2–1)
BILIRUB UR QL STRIP: NEGATIVE
BUN SERPL-MCNC: 13 MG/DL (ref 5–25)
CALCIUM SERPL-MCNC: 9.4 MG/DL (ref 8.4–10.2)
CAOX CRY URNS QL MICRO: ABNORMAL /HPF
CHLORIDE SERPL-SCNC: 103 MMOL/L (ref 96–108)
CLARITY UR: CLEAR
CO2 SERPL-SCNC: 30 MMOL/L (ref 21–32)
COLOR UR: YELLOW
CREAT SERPL-MCNC: 0.65 MG/DL (ref 0.6–1.3)
EOSINOPHIL # BLD AUTO: 0.28 THOUSAND/ΜL (ref 0–0.61)
EOSINOPHIL NFR BLD AUTO: 4 % (ref 0–6)
ERYTHROCYTE [DISTWIDTH] IN BLOOD BY AUTOMATED COUNT: 12.5 % (ref 11.6–15.1)
GFR SERPL CREATININE-BSD FRML MDRD: 92 ML/MIN/1.73SQ M
GLUCOSE P FAST SERPL-MCNC: 92 MG/DL (ref 65–99)
GLUCOSE UR STRIP-MCNC: NEGATIVE MG/DL
HCT VFR BLD AUTO: 39 % (ref 34.8–46.1)
HGB BLD-MCNC: 12.6 G/DL (ref 11.5–15.4)
HGB UR QL STRIP.AUTO: ABNORMAL
IMM GRANULOCYTES # BLD AUTO: 0.04 THOUSAND/UL (ref 0–0.2)
IMM GRANULOCYTES NFR BLD AUTO: 1 % (ref 0–2)
KETONES UR STRIP-MCNC: ABNORMAL MG/DL
LEUKOCYTE ESTERASE UR QL STRIP: NEGATIVE
LIPASE SERPL-CCNC: 19 U/L (ref 11–82)
LYMPHOCYTES # BLD AUTO: 1.97 THOUSANDS/ΜL (ref 0.6–4.47)
LYMPHOCYTES NFR BLD AUTO: 25 % (ref 14–44)
MCH RBC QN AUTO: 30.7 PG (ref 26.8–34.3)
MCHC RBC AUTO-ENTMCNC: 32.3 G/DL (ref 31.4–37.4)
MCV RBC AUTO: 95 FL (ref 82–98)
MONOCYTES # BLD AUTO: 0.67 THOUSAND/ΜL (ref 0.17–1.22)
MONOCYTES NFR BLD AUTO: 9 % (ref 4–12)
MUCOUS THREADS UR QL AUTO: ABNORMAL
NEUTROPHILS # BLD AUTO: 4.9 THOUSANDS/ΜL (ref 1.85–7.62)
NEUTS SEG NFR BLD AUTO: 60 % (ref 43–75)
NITRITE UR QL STRIP: NEGATIVE
NON-SQ EPI CELLS URNS QL MICRO: ABNORMAL /HPF
NRBC BLD AUTO-RTO: 0 /100 WBCS
PH UR STRIP.AUTO: 5.5 [PH]
PLATELET # BLD AUTO: 271 THOUSANDS/UL (ref 149–390)
PMV BLD AUTO: 9.4 FL (ref 8.9–12.7)
POTASSIUM SERPL-SCNC: 4 MMOL/L (ref 3.5–5.3)
PROT SERPL-MCNC: 7.3 G/DL (ref 6.4–8.4)
PROT UR STRIP-MCNC: ABNORMAL MG/DL
RBC # BLD AUTO: 4.11 MILLION/UL (ref 3.81–5.12)
RBC #/AREA URNS AUTO: ABNORMAL /HPF
SODIUM SERPL-SCNC: 139 MMOL/L (ref 135–147)
SP GR UR STRIP.AUTO: >=1.03 (ref 1–1.03)
UROBILINOGEN UR STRIP-ACNC: <2 MG/DL
WBC # BLD AUTO: 7.91 THOUSAND/UL (ref 4.31–10.16)
WBC #/AREA URNS AUTO: ABNORMAL /HPF

## 2024-10-24 PROCEDURE — 81001 URINALYSIS AUTO W/SCOPE: CPT

## 2024-10-24 PROCEDURE — 36415 COLL VENOUS BLD VENIPUNCTURE: CPT

## 2024-10-24 PROCEDURE — 85025 COMPLETE CBC W/AUTO DIFF WBC: CPT

## 2024-10-24 PROCEDURE — 83690 ASSAY OF LIPASE: CPT

## 2024-10-24 PROCEDURE — 80053 COMPREHEN METABOLIC PANEL: CPT

## 2024-10-25 ENCOUNTER — EVALUATION (OUTPATIENT)
Dept: PHYSICAL THERAPY | Facility: CLINIC | Age: 69
End: 2024-10-25
Payer: MEDICARE

## 2024-10-25 DIAGNOSIS — Z47.89 ENCOUNTER FOR OTHER ORTHOPEDIC AFTERCARE: Primary | ICD-10-CM

## 2024-10-25 DIAGNOSIS — M75.101 TEAR OF RIGHT SUPRASPINATUS TENDON: ICD-10-CM

## 2024-10-25 PROCEDURE — 97110 THERAPEUTIC EXERCISES: CPT | Performed by: PHYSICAL THERAPIST

## 2024-10-25 PROCEDURE — 97140 MANUAL THERAPY 1/> REGIONS: CPT | Performed by: PHYSICAL THERAPIST

## 2024-10-25 NOTE — PROGRESS NOTES
PT Re-Evaluation     Today's date: 10/25/2024  Patient name: Kasie Kimble  : 1955  MRN: [id number]  Referring provider: No ref. provider found  Dx:   Encounter Diagnosis     ICD-10-CM    1. Encounter for other orthopedic aftercare  Z47.89       2. Tear of right supraspinatus tendon  M75.101                      Assessment  Impairments: abnormal muscle tone, abnormal or restricted ROM, activity intolerance, impaired physical strength and pain with function    Assessment details: Since starting skilled PT, pain levels are decreasing, R shoulder PROM is improving gradually. Recommend pt continue skilled PT following MD protocol.  Understanding of Dx/Px/POC: good     Prognosis: good    Goals  STG's ( 3-4 weeks)  1. Pt will be independent in HEP-met  2. Pt will have improved R shoulder flex and scaption PROM to 90* in 4 weeks- met  LTG's ( 6- 8 weeks)  1. Improve FOTO score by 8-10 points in 6-8 weeks- nt  2. Pt will have decreased pain to 3/10 at worst- not met  3. Pt will have improved R shoulder AROM by 10-15*- not met  4. Pt will be able to sleep in bed with greater ease- not met  5. Pt will return to driving independently.- not met    Plan  Patient would benefit from: skilled physical therapy  Planned modality interventions: cryotherapy and TENS    Planned therapy interventions: manual therapy, joint mobilization, neuromuscular re-education, strengthening, stretching, therapeutic activities, therapeutic exercise, functional ROM exercises, flexibility and home exercise program    Frequency: 2x week  Duration in weeks: 8  Plan of Care beginning date: 10/25/2024  Plan of Care expiration date: 2024  Treatment plan discussed with: PTA and patient        Subjective Evaluation    History of Present Illness  Mechanism of injury: I.E; Pt had skilled PT for R shoulder for RTC tendonitis and was discharged with resolution of symptoms May 2024. Pt started to experience increased R shoulder pain. MRI testing  showed a full thickness supraspinatus tear and biceps tendon tear. Pt underwent RTC repair, biceps tenodesis and SAD on 24.   Pt arrives to skilled PT with her arm in a sling. Pt is limited with all functional activities involving her R UE. Pt is taking Advil and Tylenol every 3 hours. Pt has increased pain and difficulty sleeping and is currently sleeping in a reclining chair.     10/25/24; Pt is compliant in HEP. Pt has no change in functional activities 2* keeping her arm in the sling and following MD protocol. Pt has noticed that her elbow is not as painful when taking her arm out the sling to do her exercises.    Work; retired  Hobbies: traveling, bike riding, exercising  Gait; no abnormalities  Patient Goals  Patient goals for therapy: decreased pain  Patient goal: full ROM; to return to weight training for my shoulders and arm  Pain  At best pain ratin  At worst pain ratin  Location: R shoulder  Quality: dull ache, sharp and knife-like    Social Support  Lives with: alone    Employment status: not working  Hand dominance: right      Diagnostic Tests  MRI studies: abnormal  Treatments  Previous treatment: physical therapy (prior to surgery)        Objective     Neurological Testing     Sensation     Shoulder   Left Shoulder   Intact: light touch    Right Shoulder   Intact: Light touch    Reflexes   Left   Biceps (C5/C6): normal (2+)  Brachioradialis (C6): normal (2+)  Triceps (C7): normal (2+)    Additional Neurological Details  R UE reflexes NT    Active Range of Motion     Additional Active Range of Motion Details  R shoulder AROM: NT  R elbow AROM; NT    Passive Range of Motion     Right Shoulder   Flexion: 110 degrees   Abduction: 120 degrees   External rotation 45°: 35 degrees   Internal rotation 45°: 60 degrees     Right Elbow   Flexion: 130 degrees with pain  Extension: -10 degrees with pain    Additional Passive Range of Motion Details  (+) TTP R anterior shoulder  Increased muscle tone R  "upper trap/ levator scap  Cervical ROM limited with SB and rotation ROM  R shoulder strength; NT          Dx: s/p R shoulder RTC repair, biceps tenodesis, SAD (9/25/24)  EPOC: 12/6/24  CO-MORBIDITIES:  PERSONAL FACTORS: wants to return to weight lifting and exercise  Precautions: see MD protocol; no active R elbow ROM flex or supination      Manuals 9/30 10/2 10/7 10/9 10/14 10/16 10/23 10/25     R shoulder PROM; flex, scap to 90*; ER to 35*  15' 20' 22 18' 20 20 7' to hailey     R elbow PROM  5' 5' 3' 2' 1' 1' 1'     Cervical MFR  5' 5' 5' 5' 5' 4' 2'     Progress note        5'     Neuro Re-Ed                                                                                                        Ther Ex             Pendulums: CW, CCW  10 ea 15 ea  20 ea 20 ea 20 ea 20 ea     R upper trap stretch  3x20\" HEP          R levator scap stretch  3x20\" HEP          Table slides: flexion & scap        10 flexion     Seated cane ER AAROM        10     Pulleys: flexion ROM        2'     Pulleys: scaption ROM        2'                  Ther Activity                                       Gait Training                                       Modalities             Cp post tx             TENS -prn                            "

## 2024-10-28 ENCOUNTER — OFFICE VISIT (OUTPATIENT)
Dept: PHYSICAL THERAPY | Facility: CLINIC | Age: 69
End: 2024-10-28
Payer: MEDICARE

## 2024-10-28 DIAGNOSIS — Z47.89 ENCOUNTER FOR OTHER ORTHOPEDIC AFTERCARE: Primary | ICD-10-CM

## 2024-10-28 DIAGNOSIS — M75.101 TEAR OF RIGHT SUPRASPINATUS TENDON: ICD-10-CM

## 2024-10-28 PROCEDURE — 97140 MANUAL THERAPY 1/> REGIONS: CPT | Performed by: PHYSICAL THERAPIST

## 2024-10-28 PROCEDURE — 97110 THERAPEUTIC EXERCISES: CPT | Performed by: PHYSICAL THERAPIST

## 2024-10-28 NOTE — PROGRESS NOTES
"Daily Note     Today's date: 10/28/2024  Patient name: Kasie Kimble  : 1955  MRN: [id number]  Referring provider: No ref. provider found  Dx:   Encounter Diagnosis     ICD-10-CM    1. Encounter for other orthopedic aftercare  Z47.89       2. Tear of right supraspinatus tendon  M75.101                      Subjective:  Pt reports she was walking in her kitchen without her sling on. A plastic container fell down from a cabinet and she instinctively reached up quickly to grab the container. Pt notes the container was not heavy, but feels increased soreness in her R arm today upon arrival to therapy.      Objective: See treatment diary below      Assessment: Tolerated treatment well. Patient exhibited good technique with therapeutic exercises. Pt had increased stiffness and pain with AAROM ex today compared to last visit.       Plan: Continue per plan of care. Focus on improving ROM in R shoulder.         Dx: s/p R shoulder RTC repair, biceps tenodesis, SAD (24)  EPOC: 24  CO-MORBIDITIES:  PERSONAL FACTORS: wants to return to weight lifting and exercise  Precautions: see MD protocol; no active R elbow ROM flex or supination      Manuals 9/30 10/2 10/7 10/9 10/14 10/16 10/23 10/25 10/28    R shoulder PROM; flex, scap to 90*; ER to 35*  15' 20' 22 18' 20 20 7' to hailey 12'    R elbow PROM  5' 5' 3' 2' 1' 1' 1' 1'    Cervical MFR  5' 5' 5' 5' 5' 4' 2' 2'    Progress note        5'     Neuro Re-Ed                                                                                                        Ther Ex             Pendulums: CW, CCW  10 ea 15 ea  20 ea 20 ea 20 ea 20 ea 20 ea    R upper trap stretch  3x20\" HEP          Supine shld flexion AAROM HH  3x20\" HEP      5 x5\"    Table slides: flexion, scap, ER        10 flexion 10 ea    Seated cane ER AAROM        10 10 supine    Pulleys: flexion ROM        2' 2'    Pulleys: scaption ROM        2' 2'                 Ther Activity                        "                Gait Training                                       Modalities             Cp post tx             TENS -prn

## 2024-10-30 ENCOUNTER — OFFICE VISIT (OUTPATIENT)
Dept: PHYSICAL THERAPY | Facility: CLINIC | Age: 69
End: 2024-10-30
Payer: MEDICARE

## 2024-10-30 DIAGNOSIS — Z47.89 ENCOUNTER FOR OTHER ORTHOPEDIC AFTERCARE: Primary | ICD-10-CM

## 2024-10-30 DIAGNOSIS — M75.101 TEAR OF RIGHT SUPRASPINATUS TENDON: ICD-10-CM

## 2024-10-30 PROCEDURE — 97140 MANUAL THERAPY 1/> REGIONS: CPT | Performed by: PHYSICAL THERAPIST

## 2024-10-30 PROCEDURE — 97110 THERAPEUTIC EXERCISES: CPT | Performed by: PHYSICAL THERAPIST

## 2024-10-30 NOTE — PROGRESS NOTES
"Daily Note     Today's date: 10/30/2024  Patient name: Kasie Kimble  : 1955  MRN: [id number]  Referring provider: No ref. provider found  Dx:   Encounter Diagnosis     ICD-10-CM    1. Encounter for other orthopedic aftercare  Z47.89       2. Tear of right supraspinatus tendon  M75.101                      Subjective: Pt reports 1/10 currently. Pt is having increased difficulty sleeping at night. Pt is compliant in HEP. Pt notes her shoulder feels stiff in general and she will be looking forward to being discharged from her sling.      Objective: See treatment diary below      Assessment: Tolerated treatment well. Patient exhibited good technique with therapeutic exercises. Pt was issued an updated written HEP. Pt's PROM is improving with flexion and scaption. Pt was able to perform more reps of there ex today.      Plan: Continue per plan of care. Continue to progress according to MD protocol.     Dx: s/p R shoulder RTC repair, biceps tenodesis, SAD (24)  EPOC: 24  CO-MORBIDITIES:  PERSONAL FACTORS: wants to return to weight lifting and exercise  Precautions: see MD protocol; no active R elbow ROM flex or supination  Access Code: [email or web address]    Manuals 9/30 10/2 10/7 10/9 10/14 10/16 10/23 10/25 10/28 10/30   R shoulder PROM  15' 20' 22 18' 20 20 7' to hailey 12' 20'   GHJ mobs grade I-II inf/post          2'   R elbow PROM  5' 5' 3' 2' 1' 1' 1' 1' 1'   Cervical MFR  5' 5' 5' 5' 5' 4' 2' 2' 2'   Progress note        5'     Neuro Re-Ed                                                                                                        Ther Ex             Pendulums: CW, CCW  10 ea 15 ea  20 ea 20 ea 20 ea 20 ea 20 ea 20 ea   R upper trap stretch  3x20\" HEP       3x20\"   Supine shld flexion AAROM HH  3x20\" HEP      5 x5\" 10x5\"   Table slides: flexion, scap, ER        10 flexion 10 ea 10x5\" ea   Seated cane ER AAROM        10 10 supine 10 supin   Pulleys: flexion ROM        2' 2' 2'   Pulleys: " scaption ROM        2' 2' 2'                Ther Activity                                       Gait Training                                       Modalities             Cp post tx             TENS -prn

## 2024-11-04 ENCOUNTER — OFFICE VISIT (OUTPATIENT)
Dept: PHYSICAL THERAPY | Facility: CLINIC | Age: 69
End: 2024-11-04
Payer: MEDICARE

## 2024-11-04 ENCOUNTER — OFFICE VISIT (OUTPATIENT)
Dept: OBGYN CLINIC | Facility: OTHER | Age: 69
End: 2024-11-04

## 2024-11-04 VITALS — BODY MASS INDEX: 24.29 KG/M2 | WEIGHT: 132 LBS | HEIGHT: 62 IN

## 2024-11-04 DIAGNOSIS — Z98.890 S/P RIGHT ROTATOR CUFF REPAIR: Primary | ICD-10-CM

## 2024-11-04 DIAGNOSIS — M75.101 TEAR OF RIGHT SUPRASPINATUS TENDON: ICD-10-CM

## 2024-11-04 DIAGNOSIS — Z47.89 ENCOUNTER FOR OTHER ORTHOPEDIC AFTERCARE: Primary | ICD-10-CM

## 2024-11-04 PROCEDURE — 97140 MANUAL THERAPY 1/> REGIONS: CPT

## 2024-11-04 PROCEDURE — 97110 THERAPEUTIC EXERCISES: CPT

## 2024-11-04 PROCEDURE — 99024 POSTOP FOLLOW-UP VISIT: CPT | Performed by: PHYSICIAN ASSISTANT

## 2024-11-04 NOTE — PROGRESS NOTES
"Surgery: SARS w/RCR, BT, SAD on 9/25/2024    S: Patient is doing well.  They have been compliant with formal PT and the HEP.  Denies new injury or trauma    Concerned about caring for her 104 y/o mother.    Ht 5' 2\" (1.575 m)   Wt 59.9 kg (132 lb)   BMI 24.14 kg/m²     O: RIGHT shoulder  Incisions healed  Right arm in sling  Good elbow, wrist and hand range of motion  Skin - warm and dry  SI  NVI    A/P: 6 weeks following arthroscopic RIGHT shoulder rotator cuff repair, bicep tenodesis and subacromial decompression  Continue with formal physical therapy - following standard protocol  Remove sling entirely in 2 days - it will no longer be needed.  Be careful with no lifting.  HEP - per therapy.    Pain control - Tylenol 1000 mg every 8 hours  Ice as needed - 20 minutes on and 20 minutes off  Follow up in 6-8 weeks   "

## 2024-11-04 NOTE — PROGRESS NOTES
"Daily Note     Today's date: 2024  Patient name: Kasie Kimble  : 1955  MRN: [id number]  Referring provider: No ref. provider found  Dx:   Encounter Diagnosis     ICD-10-CM    1. Encounter for other orthopedic aftercare  Z47.89       2. Tear of right supraspinatus tendon  M75.101                      Subjective: Pt continues to have difficulty sleeping at night. Pt is compliant in HEP.       Objective: See treatment diary below      Assessment: Tolerated treatment well. Patient exhibited good technique with therapeutic exercises. Pt's PROM continues to improve with flexion and scaption.       Plan: Continue per plan of care. Continue to progress according to MD protocol.     Dx: s/p R shoulder RTC repair, biceps tenodesis, SAD (24)  EPOC: 24  CO-MORBIDITIES:  PERSONAL FACTORS: wants to return to weight lifting and exercise  Precautions: see MD protocol; no active R elbow ROM flex or supination  Access Code: [email or web address]    Manuals 10/16 10/23 10/25 10/28 10/30 11/4   R shoulder PROM 20 20 7' to hailey 12' 20' 20   GHJ mobs grade I-II inf/post     2'    R elbow PROM 1' 1' 1' 1' 1' 3   Cervical MFR 5' 4' 2' 2' 2' 5   Progress note   5'      Neuro Re-Ed                                                                        Ther Ex         Pendulums: CW, CCW 20 ea 20 ea 20 ea 20 ea 20 ea 20 ea   R upper trap stretch     3x20\" 3x20\"   Levator stretch      3x20\"   Supine shld flexion AAROM HH    5 x5\" 10x5\" 10x5\"   Table slides: flexion, scap, ER   10 flexion 10 ea 10x5\" ea 10x5\" ea   Seated cane ER AAROM   10 10 supine 10 supin 10 supine   Pulleys: flexion ROM   2' 2' 2' 2'   Pulleys: scaption ROM   2' 2' 2' 2'            Ther Activity                           Gait Training                           Modalities         Cp post tx         TENS -prn                             "

## 2024-11-06 ENCOUNTER — OFFICE VISIT (OUTPATIENT)
Dept: PHYSICAL THERAPY | Facility: CLINIC | Age: 69
End: 2024-11-06
Payer: MEDICARE

## 2024-11-06 DIAGNOSIS — Z47.89 ENCOUNTER FOR OTHER ORTHOPEDIC AFTERCARE: Primary | ICD-10-CM

## 2024-11-06 PROCEDURE — 97110 THERAPEUTIC EXERCISES: CPT

## 2024-11-06 PROCEDURE — 97140 MANUAL THERAPY 1/> REGIONS: CPT

## 2024-11-06 PROCEDURE — 97112 NEUROMUSCULAR REEDUCATION: CPT

## 2024-11-06 NOTE — PROGRESS NOTES
"Daily Note     Today's date: 2024  Patient name: Kasie Kimble  : 1955  MRN: [id number]  Referring provider: No ref. provider found  Dx:   Encounter Diagnosis     ICD-10-CM    1. Encounter for other orthopedic aftercare  Z47.89                      Subjective: Pt continues to have difficulty sleeping at night. Pt is compliant in HEP.       Objective: See treatment diary below      Assessment: Tolerated treatment well. Patient exhibited good technique with therapeutic exercises. Pt's PROM continues to improve with flexion and scaption. Introduced new TE with great toelrance. Reviewed new protocol with pt, answered pt questions to pt satisfaction.       Plan: Continue per plan of care. Continue to progress according to MD protocol.     Dx: s/p R shoulder RTC repair, biceps tenodesis, SAD (24)  EPOC: 24  CO-MORBIDITIES:  PERSONAL FACTORS: wants to return to weight lifting and exercise  Precautions: see MD protocol; no active R elbow ROM flex or supination  Access Code: [email or web address]    Manuals 10/16 10/23 10/25 10/28 10/30 11/4 11/6    R shoulder PROM 20 20 7' to hailey 12' 20' 20 10   GHJ mobs grade I-II inf/post     2'     R elbow PROM 1' 1' 1' 1' 1' 3 5   Cervical MFR 5' 4' 2' 2' 2' 5 5   Progress note   5'       Neuro Re-Ed                                                                                Ther Ex          Pendulums: CW, CCW 20 ea 20 ea 20 ea 20 ea 20 ea 20 ea 20 ea   R upper trap stretch     3x20\" 3x20\" 3x20\"   Levator stretch      3x20\" 3x20\"   Supine shld flexion AAROM HH    5 x5\" 10x5\" 10x5\" 10x5\"   Table slides: flexion, scap, ER   10 flexion 10 ea 10x5\" ea 10x5\" ea 10x5\" ea   Seated cane ER AAROM   10 10 supine 10 supin 10 supine 10 supine   Pulleys: flexion ROM   2' 2' 2' 2' 2'   Pulleys: scaption ROM   2' 2' 2' 2' 2'   Isometric 4dir UE       5\" 10   Ther Activity                              Gait Training                              Modalities          Cp post tx        "   TENS -prn

## 2024-11-11 ENCOUNTER — OFFICE VISIT (OUTPATIENT)
Dept: PHYSICAL THERAPY | Facility: CLINIC | Age: 69
End: 2024-11-11
Payer: MEDICARE

## 2024-11-11 DIAGNOSIS — Z47.89 ENCOUNTER FOR OTHER ORTHOPEDIC AFTERCARE: Primary | ICD-10-CM

## 2024-11-11 DIAGNOSIS — M75.101 TEAR OF RIGHT SUPRASPINATUS TENDON: ICD-10-CM

## 2024-11-11 PROCEDURE — 97110 THERAPEUTIC EXERCISES: CPT

## 2024-11-11 PROCEDURE — 97112 NEUROMUSCULAR REEDUCATION: CPT

## 2024-11-11 PROCEDURE — 97140 MANUAL THERAPY 1/> REGIONS: CPT

## 2024-11-11 NOTE — PROGRESS NOTES
"Daily Note     Today's date: 2024  Patient name: Kasie Kimble  : 1955  MRN: [id number]  Referring provider: No ref. provider found  Dx:   Encounter Diagnosis     ICD-10-CM    1. Encounter for other orthopedic aftercare  Z47.89       2. Tear of right supraspinatus tendon  M75.101                      Subjective: Patient reports being more sore today.       Objective: See treatment diary below      Assessment: Pt did well with all TE listed, trigger points in R UT notes today.       Plan: Continue per plan of care.      Dx: s/p R shoulder RTC repair, biceps tenodesis, SAD (24)  EPOC: 24  CO-MORBIDITIES:  PERSONAL FACTORS: wants to return to weight lifting and exercise  Precautions: see MD protocol; no active R elbow ROM flex or supination  Access Code: [email or web address]    Manuals 10/16 10/23 10/25 10/28 10/30 11/4 11/6  11/11   R shoulder PROM 20 20 7' to hailey 12' 20' 20 10 HA   GHJ mobs grade I-II inf/post     2'      R elbow PROM 1' 1' 1' 1' 1' 3 5 HA   Cervical MFR 5' 4' 2' 2' 2' 5 5 HA   Progress note   5'        Neuro Re-Ed                                                                                        Ther Ex           Pendulums: CW, CCW 20 ea 20 ea 20 ea 20 ea 20 ea 20 ea 20 ea 20 ea   R upper trap stretch     3x20\" 3x20\" 3x20\" 3x20\"   Levator stretch      3x20\" 3x20\" 3x20\"   Supine shld flexion AAROM HH    5 x5\" 10x5\" 10x5\" 10x5\" 10x5\"   Table slides: flexion, scap, ER   10 flexion 10 ea 10x5\" ea 10x5\" ea 10x5\" ea 10x5\" ea   Seated cane ER AAROM   10 10 supine 10 supin 10 supine 10 supine 10 supine   Pulleys: flexion ROM   2' 2' 2' 2' 2' 2'   Pulleys: scaption ROM   2' 2' 2' 2' 2' 2'   Isometric 4dir UE       5\" 10 5\"x10   Ther Activity                                 Gait Training                                 Modalities           Cp post tx           TENS -prn                                 "

## 2024-11-13 ENCOUNTER — OFFICE VISIT (OUTPATIENT)
Dept: PHYSICAL THERAPY | Facility: CLINIC | Age: 69
End: 2024-11-13
Payer: MEDICARE

## 2024-11-13 DIAGNOSIS — Z47.89 ENCOUNTER FOR OTHER ORTHOPEDIC AFTERCARE: Primary | ICD-10-CM

## 2024-11-13 DIAGNOSIS — M75.101 TEAR OF RIGHT SUPRASPINATUS TENDON: ICD-10-CM

## 2024-11-13 PROCEDURE — 97112 NEUROMUSCULAR REEDUCATION: CPT

## 2024-11-13 PROCEDURE — 97140 MANUAL THERAPY 1/> REGIONS: CPT

## 2024-11-13 PROCEDURE — 97110 THERAPEUTIC EXERCISES: CPT

## 2024-11-13 NOTE — PROGRESS NOTES
"Daily Note     Today's date: 2024  Patient name: Kasie Kimble  : 1955  MRN: [id number]  Referring provider: No ref. provider found  Dx:   Encounter Diagnosis     ICD-10-CM    1. Encounter for other orthopedic aftercare  Z47.89       2. Tear of right supraspinatus tendon  M75.101                      Subjective: Patient reports feeling good today.      Objective: See treatment diary below      Assessment: Pt continues to have Trigger points throughout UT, pt responded well to manuals. Increased reps to AAROM UE overhead with good tolerance.       Plan: Continue per plan of care.      Dx: s/p R shoulder RTC repair, biceps tenodesis, SAD (24)  EPOC: 24  CO-MORBIDITIES:  PERSONAL FACTORS: wants to return to weight lifting and exercise  Precautions: see MD protocol; no active R elbow ROM flex or supination  Access Code: [email or web address]    Manuals 10/28 10/30 11/4 11/6  11/11 11/13   R shoulder PROM 12' 20' 20 10 HA wdc   GHJ mobs grade I-II inf/post  2'       R elbow PROM 1' 1' 3 5 HA wdc   Cervical MFR 2' 2' 5 5 HA    Progress note         Neuro Re-Ed                                                                        Ther Ex         Pendulums: CW, CCW 20 ea 20 ea 20 ea 20 ea 20 ea 20x   R upper trap stretch  3x20\" 3x20\" 3x20\" 3x20\" 3x20\"   Levator stretch   3x20\" 3x20\" 3x20\" 3x20\"   Supine shld flexion AAROM HH 5 x5\" 10x5\" 10x5\" 10x5\" 10x5\" 20x5\"   Table slides: flexion, scap, ER 10 ea 10x5\" ea 10x5\" ea 10x5\" ea 10x5\" ea 10x5\" ea   Seated cane ER AAROM 10 supine 10 supin 10 supine 10 supine 10 supine 10 supine   Pulleys: flexion ROM 2' 2' 2' 2' 2' 3'   Pulleys: scaption ROM 2' 2' 2' 2' 2' 3'   Isometric 4dir UE    5\" 10 5\"x10 5\" 10   Ther Activity                           Gait Training                           Modalities         Cp post tx         TENS -prn                               "

## 2024-11-18 ENCOUNTER — EVALUATION (OUTPATIENT)
Dept: PHYSICAL THERAPY | Facility: CLINIC | Age: 69
End: 2024-11-18
Payer: MEDICARE

## 2024-11-18 DIAGNOSIS — Z47.89 ENCOUNTER FOR OTHER ORTHOPEDIC AFTERCARE: Primary | ICD-10-CM

## 2024-11-18 DIAGNOSIS — M75.101 TEAR OF RIGHT SUPRASPINATUS TENDON: ICD-10-CM

## 2024-11-18 PROCEDURE — 97110 THERAPEUTIC EXERCISES: CPT | Performed by: PHYSICAL THERAPIST

## 2024-11-18 PROCEDURE — 97140 MANUAL THERAPY 1/> REGIONS: CPT | Performed by: PHYSICAL THERAPIST

## 2024-11-18 NOTE — PROGRESS NOTES
PT Re-Evaluation     Today's date: 2024  Patient name: Kasie Kimble  : 1955  MRN: [id number]  Referring provider: No ref. provider found  Dx:   Encounter Diagnosis     ICD-10-CM    1. Encounter for other orthopedic aftercare  Z47.89       2. Tear of right supraspinatus tendon  M75.101                      Assessment  Impairments: abnormal muscle tone, abnormal or restricted ROM, activity intolerance, impaired physical strength and pain with function    Assessment details: Since starting skilled PT, pain levels have increased slight at worst, R shoulder PROM is improving, with gradual functional progress. Recommend pt continue skilled PT focus on on improving ROM active and passive.  Understanding of Dx/Px/POC: good     Prognosis: good    Goals  STG's ( 3-4 weeks)  1. Pt will be independent in HEP-met  2. Pt will have improved R shoulder flex and scaption PROM to 90* in 4 weeks- met  LTG's ( 6- 8 weeks)  1. Improve FOTO score by 8-10 points in 6-8 weeks- not met  2. Pt will have decreased pain to 3/10 at worst- not met  3. Pt will have improved R shoulder AROM by 10-15*- not met  4. Pt will be able to sleep in bed with greater ease- partial met  5. Pt will return to driving independently.- met    Plan  Patient would benefit from: skilled physical therapy  Planned modality interventions: cryotherapy and TENS    Planned therapy interventions: manual therapy, joint mobilization, neuromuscular re-education, strengthening, stretching, therapeutic activities, therapeutic exercise, functional ROM exercises, flexibility and home exercise program    Frequency: 2x week  Duration in weeks: 8  Plan of Care beginning date: 2024  Plan of Care expiration date: 2025  Treatment plan discussed with: PTA and patient        Subjective Evaluation    History of Present Illness  Mechanism of injury: I.E; Pt had skilled PT for R shoulder for RTC tendonitis and was discharged with resolution of symptoms May  . Pt started to experience increased R shoulder pain. MRI testing showed a full thickness supraspinatus tear and biceps tendon tear. Pt underwent RTC repair, biceps tenodesis and SAD on 24.   Pt arrives to skilled PT with her arm in a sling. Pt is limited with all functional activities involving her R UE. Pt is taking Advil and Tylenol every 3 hours. Pt has increased pain and difficulty sleeping and is currently sleeping in a reclining chair.     10/25/24; Pt is compliant in HEP. Pt has no change in functional activities 2* keeping her arm in the sling and following MD protocol. Pt has noticed that her elbow is not as painful when taking her arm out the sling to do her exercises.    24: Pt is compliant in HEP. Pt has returned to driving activities. Pt needs to bend over to wash her hair or complete dressing activities. Pt needs to put her R arm in first and put the head open over next. Pt had increased pain when sleeping at night last night that radiated down the back of her arm.    Work; retired  Hobbies: traveling, bike riding, exercising  Gait; no abnormalities  Patient Goals  Patient goals for therapy: decreased pain  Patient goal: full ROM; to return to weight training for my shoulders and arm  Pain  At best pain ratin  At worst pain ratin  Location: R shoulder  Quality: dull ache, sharp and knife-like    Social Support  Lives with: alone    Employment status: not working  Hand dominance: right      Diagnostic Tests  MRI studies: abnormal  Treatments  Previous treatment: physical therapy (prior to surgery)        Objective     Neurological Testing     Sensation     Shoulder   Left Shoulder   Intact: light touch    Right Shoulder   Intact: Light touch    Reflexes   Left   Biceps (C5/C6): normal (2+)  Brachioradialis (C6): normal (2+)  Triceps (C7): normal (2+)    Additional Neurological Details  R UE reflexes NT    Active Range of Motion     Right Shoulder   Flexion: 70 degrees  "  Abduction: 45 degrees   External rotation 45°: 45 degrees   Internal rotation 45°: 50 degrees     Additional Active Range of Motion Details  R shoulder AROM: NT  R elbow AROM; NT    Passive Range of Motion     Right Shoulder   Flexion: 132 degrees   Abduction: 130 degrees   External rotation 45°: 50 degrees   Internal rotation 45°: 70 degrees     Right Elbow   Flexion: 130 degrees with pain  Extension: -10 degrees with pain    Additional Passive Range of Motion Details  (+) TTP R anterior shoulder  Increased muscle tone R upper trap/ levator scap  Cervical ROM limited with SB and rotation ROM  R shoulder strength; NT          Dx: s/p R shoulder RTC repair, biceps tenodesis, SAD (9/25/24)  EPOC: 1/13/25  CO-MORBIDITIES:  PERSONAL FACTORS: wants to return to weight lifting and exercise  Precautions: see MD protocol; no active R elbow ROM flex or supination  Access Code: [email or web address]    Manuals 10/28 10/30 11/4 11/6  11/11 11/13 11/18   R shoulder PROM 12' 20' 20 10 HA wdc 10'   GHJ mobs grade I-II inf/post  2'     2'   R elbow PROM 1' 1' 3 5 HA wdc    Cervical MFR 2' 2' 5 5 HA     Progress note       8'   Neuro Re-Ed                                                                                Ther Ex          Pendulums: CW, CCW 20 ea 20 ea 20 ea 20 ea 20 ea 20x 20 ea   Std shld flexion AROM   3x20\" 3x20\" 3x20\" 3x20\" 3x20\" 10   Std scap AROM   3x20\" 3x20\" 3x20\" 3x20\" 5   Supine shld flexion AAROM HH 5 x5\" 10x5\" 10x5\" 10x5\" 10x5\" 20x5\" 10x5\"   Table slides: flexion, scap, ER 10 ea 10x5\" ea 10x5\" ea 10x5\" ea 10x5\" ea 10x5\" ea    Seated cane ER AAROM 10 supine 10 supin 10 supine 10 supine 10 supine 10 supine 10x5\" supine   Pulleys: flexion ROM 2' 2' 2' 2' 2' 3' 3'   Pulleys: scaption ROM 2' 2' 2' 2' 2' 3' 3'   Isometric 4dir UE    5\" 10 5\"x10 5\" 10 5\"x5   Ther Activity                              Gait Training                              Modalities          Cp post tx          TENS -prn                                "

## 2024-11-20 ENCOUNTER — OFFICE VISIT (OUTPATIENT)
Dept: PHYSICAL THERAPY | Facility: CLINIC | Age: 69
End: 2024-11-20
Payer: MEDICARE

## 2024-11-20 DIAGNOSIS — Z47.89 ENCOUNTER FOR OTHER ORTHOPEDIC AFTERCARE: Primary | ICD-10-CM

## 2024-11-20 DIAGNOSIS — M75.101 TEAR OF RIGHT SUPRASPINATUS TENDON: ICD-10-CM

## 2024-11-20 PROCEDURE — 97112 NEUROMUSCULAR REEDUCATION: CPT

## 2024-11-20 PROCEDURE — 97110 THERAPEUTIC EXERCISES: CPT

## 2024-11-20 PROCEDURE — 97140 MANUAL THERAPY 1/> REGIONS: CPT

## 2024-11-20 NOTE — PROGRESS NOTES
"Daily Note     Today's date: 2024  Patient name: Kasie Kimble  : 1955  MRN: [id number]  Referring provider: No ref. provider found  Dx:   Encounter Diagnosis     ICD-10-CM    1. Encounter for other orthopedic aftercare  Z47.89       2. Tear of right supraspinatus tendon  M75.101                      Subjective: Pt states feeling good.       Objective: See treatment diary below      Assessment: Pt performed below TE with great tolerance, and pt tolerated increased reps very well, solely ms fatigue/soreness post.       Plan: Continue per plan of care.      Dx: s/p R shoulder RTC repair, biceps tenodesis, SAD (24)  EPOC: 25  CO-MORBIDITIES:  PERSONAL FACTORS: wants to return to weight lifting and exercise  Precautions: see MD protocol; no active R elbow ROM flex or supination  Access Code: [email or web address]    Manuals    R shoulder PROM 10 HA wdc 10' Wdc 5   GHJ mobs grade I-II inf/post    2'    R elbow PROM 5 HA wdc  Wdc 5   Cervical MFR 5 HA      Progress note    8'    Neuro Re-Ed                                                                Ther Ex        Pendulums: CW, CCW 20 ea 20 ea 20x 20 ea 20 ea   Std shld flexion AROM  3x20\" 3x20\" 3x20\" 10 2x10   Std scap AROM 3x20\" 3x20\" 3x20\" 5 2x10   Supine shld flexion AAROM HH 10x5\" 10x5\" 20x5\" 10x5\" 2x10   Table slides: flexion, scap, ER 10x5\" ea 10x5\" ea 10x5\" ea  10x 5\" ea   Seated cane ER AAROM 10 supine 10 supine 10 supine 10x5\" supine 10x5\" supine   Pulleys: flexion ROM 2' 2' 3' 3' 3'   Pulleys: scaption ROM 2' 2' 3' 3' 3'   Isometric 4dir UE 5\" 10 5\"x10 5\" 10 5\"x5 5\" 10   Ther Activity                        Gait Training                        Modalities        Cp post tx        TENS -prn                              "

## 2024-11-25 ENCOUNTER — HOSPITAL ENCOUNTER (OUTPATIENT)
Dept: RADIOLOGY | Facility: HOSPITAL | Age: 69
Discharge: HOME | End: 2024-11-25
Payer: MEDICARE

## 2024-11-25 DIAGNOSIS — R07.9 CHEST PAIN, UNSPECIFIED TYPE: ICD-10-CM

## 2024-11-25 PROCEDURE — 71046 X-RAY EXAM CHEST 2 VIEWS: CPT

## 2024-11-27 ENCOUNTER — OFFICE VISIT (OUTPATIENT)
Dept: PHYSICAL THERAPY | Facility: CLINIC | Age: 69
End: 2024-11-27
Payer: MEDICARE

## 2024-11-27 DIAGNOSIS — Z47.89 ENCOUNTER FOR OTHER ORTHOPEDIC AFTERCARE: Primary | ICD-10-CM

## 2024-11-27 DIAGNOSIS — M75.101 TEAR OF RIGHT SUPRASPINATUS TENDON: ICD-10-CM

## 2024-11-27 PROCEDURE — 97110 THERAPEUTIC EXERCISES: CPT

## 2024-11-27 PROCEDURE — 97112 NEUROMUSCULAR REEDUCATION: CPT

## 2024-11-27 PROCEDURE — 97140 MANUAL THERAPY 1/> REGIONS: CPT

## 2024-11-27 NOTE — PROGRESS NOTES
"Daily Note     Today's date: 2024  Patient name: Kasie Kimble  : 1955  MRN: [id number]  Referring provider: No ref. provider found  Dx:   Encounter Diagnosis     ICD-10-CM    1. Encounter for other orthopedic aftercare  Z47.89       2. Tear of right supraspinatus tendon  M75.101           Start Time: 0752          Subjective: Pt states feeling stiff/tight due to not performing her HEP on a regular bases recently as she normally would.      Objective: See treatment diary below      Assessment:introduced new TE per protocol with great tolerance, pt was hesitant however, no increased pain or discomfort solely ms fatigue/soreness.       Plan: Continue per plan of care.      Dx: s/p R shoulder RTC repair, biceps tenodesis, SAD (24)  EPOC: 25  CO-MORBIDITIES:  PERSONAL FACTORS: wants to return to weight lifting and exercise  Precautions: see MD protocol; no active R elbow ROM flex or supination  Access Code: [email or web address]    Manuals    R shoulder PROM 10 HA wdc 10' Wdc 5 Wdc 4   GHJ mobs grade I-II inf/post    2'     R elbow PROM 5 HA wdc  Wdc 5 Wdc 4   Cervical MFR 5 HA       Progress note    8'     Neuro Re-Ed                                                                        Ther Ex         Pendulums: CW, CCW 20 ea 20 ea 20x 20 ea 20 ea 20 ea   Std shld flexion AROM  3x20\" 3x20\" 3x20\" 10 2x10 2x10   Std scap AROM 3x20\" 3x20\" 3x20\" 5 2x10 2x10   Supine shld flexion AAROM HH 10x5\" 10x5\" 20x5\" 10x5\" 2x10 2x10   Table slides: flexion, scap, ER 10x5\" ea 10x5\" ea 10x5\" ea  10x 5\" ea 10x 5 ea   Seated cane ER AAROM 10 supine 10 supine 10 supine 10x5\" supine 10x5\" supine 10x5\" supine   Pulleys: flexion ROM 2' 2' 3' 3' 3' 3'   Pulleys: scaption ROM 2' 2' 3' 3' 3' 3'   Isometric 4dir UE 5\" 10 5\"x10 5\" 10 5\"x5 5\" 10 5\" 10   Ther Activity                           Gait Training                           Modalities         MHP supine post      8'   TENS -prn       "

## 2024-11-29 ENCOUNTER — OFFICE VISIT (OUTPATIENT)
Dept: PHYSICAL THERAPY | Facility: CLINIC | Age: 69
End: 2024-11-29
Payer: MEDICARE

## 2024-11-29 DIAGNOSIS — M75.101 TEAR OF RIGHT SUPRASPINATUS TENDON: ICD-10-CM

## 2024-11-29 DIAGNOSIS — Z47.89 ENCOUNTER FOR OTHER ORTHOPEDIC AFTERCARE: Primary | ICD-10-CM

## 2024-11-29 PROCEDURE — 97140 MANUAL THERAPY 1/> REGIONS: CPT

## 2024-11-29 PROCEDURE — 97112 NEUROMUSCULAR REEDUCATION: CPT

## 2024-11-29 PROCEDURE — 97110 THERAPEUTIC EXERCISES: CPT

## 2024-11-29 NOTE — PROGRESS NOTES
"Daily Note     Today's date: 2024  Patient name: Kasie Kimble  : 1955  MRN: [id number]  Referring provider: No ref. provider found  Dx:   Encounter Diagnosis     ICD-10-CM    1. Encounter for other orthopedic aftercare  Z47.89       2. Tear of right supraspinatus tendon  M75.101                      Subjective: Pt /o mm achiness and stiffness this AM.      Objective: See treatment diary below      Assessment: Tolerated treatment fair. Patient demonstrated fatigue post treatment and would benefit from continued PT for cont'd work on AROM and postural strengthening.  Pt tends to hike the shld w/ elevation.  Pt w/ adhesions w/ scap PROM in SL.  Pt educated in scap mov't w/ GH elevation,  needs tactile cues for completion.      Plan: Continue per plan of care.  Progress treament per protocol.      Dx: s/p R shoulder RTC repair, biceps tenodesis, SAD (24)  EPOC: 25  CO-MORBIDITIES:  PERSONAL FACTORS: wants to return to weight lifting and exercise  Precautions: see MD protocol; no active R elbow ROM flex or supination  Access Code: [email or web address]    Manuals     R shoulder PROM 10 HA wdc 10' Wdc 5 Wdc 4 JK    GHJ mobs grade I-II inf/post    2'       R elbow PROM 5 HA wdc  Wdc 5 Wdc 4 JK    Cervical MFR 5 HA         Progress note    8'       Neuro Re-Ed           Scap depression       10x10\"    SL AA abd w/ scap mov't       10x    SL ER       10                                                Ther Ex           Pendulums: CW, CCW 20 ea 20 ea 20x 20 ea 20 ea 20 ea 20 ea    Std shld flexion AROM  3x20\" 3x20\" 3x20\" 10 2x10 2x10 2x10    Std scap AROM 3x20\" 3x20\" 3x20\" 5 2x10 2x10 2x10    Supine shld flexion AAROM HH 10x5\" 10x5\" 20x5\" 10x5\" 2x10 2x10 AROM 10    Table slides: flexion, scap, ER 10x5\" ea 10x5\" ea 10x5\" ea  10x 5\" ea 10x 5 ea     Seated cane ER AAROM 10 supine 10 supine 10 supine 10x5\" supine 10x5\" supine 10x5\" supine     Pulleys: flexion ROM 2' 2' 3' " "3' 3' 3' 3'    Pulleys: scaption ROM 2' 2' 3' 3' 3' 3' 3'    Isometric 4dir UE 5\" 10 5\"x10 5\" 10 5\"x5 5\" 10 5\" 10 hep    Supine ER       peachTB 20    Supine shld  horz abduction       peachTB 20 Hold NV p!   SL scap depression       10 x10\"                          Ther Activity                                 Gait Training                                 Modalities           MHP supine post      8'     TENS -prn                                      "

## 2024-12-02 ENCOUNTER — ANNUAL EXAM (OUTPATIENT)
Dept: OBGYN CLINIC | Facility: CLINIC | Age: 69
End: 2024-12-02
Payer: MEDICARE

## 2024-12-02 VITALS — DIASTOLIC BLOOD PRESSURE: 74 MMHG | SYSTOLIC BLOOD PRESSURE: 116 MMHG | WEIGHT: 130 LBS | BODY MASS INDEX: 23.78 KG/M2

## 2024-12-02 DIAGNOSIS — Z11.3 SCREEN FOR STD (SEXUALLY TRANSMITTED DISEASE): ICD-10-CM

## 2024-12-02 DIAGNOSIS — Z12.31 ENCOUNTER FOR SCREENING MAMMOGRAM FOR BREAST CANCER: Primary | ICD-10-CM

## 2024-12-02 DIAGNOSIS — Z01.419 WOMEN'S ANNUAL ROUTINE GYNECOLOGICAL EXAMINATION: ICD-10-CM

## 2024-12-02 PROCEDURE — G0145 SCR C/V CYTO,THINLAYER,RESCR: HCPCS | Performed by: OBSTETRICS & GYNECOLOGY

## 2024-12-02 PROCEDURE — 87491 CHLMYD TRACH DNA AMP PROBE: CPT | Performed by: OBSTETRICS & GYNECOLOGY

## 2024-12-02 PROCEDURE — 87591 N.GONORRHOEAE DNA AMP PROB: CPT | Performed by: OBSTETRICS & GYNECOLOGY

## 2024-12-02 PROCEDURE — G0101 CA SCREEN;PELVIC/BREAST EXAM: HCPCS | Performed by: OBSTETRICS & GYNECOLOGY

## 2024-12-02 PROCEDURE — G0476 HPV COMBO ASSAY CA SCREEN: HCPCS | Performed by: OBSTETRICS & GYNECOLOGY

## 2024-12-02 NOTE — PATIENT INSTRUCTIONS
The patient was informed of a stable menopausal GYN examination.  A Pap smear was performed we will also will screen for GC and chlamydia.  She will continue getting yearly mammograms.  She will continue her colonoscopies as directed.  She is content with her weight.  She is doing well with her right shoulder surgery.  She should return to my office in 2 years unless new issues occur or new problems occur or her Pap smears abnormalities.

## 2024-12-02 NOTE — PROGRESS NOTES
Name: Kasie Kimble      : 1955      MRN: [id number]  Encounter Provider: Shen Lemons MD  Encounter Date: 2024   Encounter department: [facility]  :  Assessment & Plan  Encounter for screening mammogram for breast cancer  The patient was informed of a stable menopausal GYN examination.  A Pap smear was performed.  We also will screen for GC and chlamydia.  She will continue getting yearly mammograms.  She continued her colonoscopies as directed.  She feels safe at home.  She is a dentist on a regular basis.  Is no problem depression or anxiety.  She should return to my office in 2 years unless new issues occur.  Orders:    Mammo screening bilateral w 3d and cad; Future    Women's annual routine gynecological examination             History of Present Illness     HPI  Kasie Kimble is a 69 y.o. female who presents for annual GYN examination.  She is a  1 para 1 with 1 vaginal delivery many years ago.  She is menopausal.  She is in a stable relationship but she has been sexually active in the last count a year.  We will do a Pap smear and GC and chlamydia.  She is content with her weight.  She is recovering from right shoulder surgery secondary to her rotator cuff injury.  She is content with her weight.  She feels safe at home.  She is a dentist on a regular basis.  Denies any prior depression or anxiety.  She is now retired .  Her PHQ score was a total of 2 with some problems sleeping and feeling tired.  There is no prior issue with depression.  There are no new major family illnesses report.  Her mother is 102 years old still alive and thriving.  Medication list reviewed.  She still using Valtrex and Macrodantin as needed.  Her colonoscopy is up-to-date.  She does have a history of diverticulosis.  She will need a Pap smear today.      Review of Systems   All other systems reviewed and are negative.       Objective   /74   Wt 59 kg (130 lb)   BMI  23.78 kg/m²      Physical Exam  Vitals reviewed. Exam conducted with a chaperone present.   Constitutional:       Appearance: Normal appearance.   HENT:      Head: Normocephalic and atraumatic.      Nose: Nose normal.      Mouth/Throat:      Mouth: Mucous membranes are moist.   Eyes:      Pupils: Pupils are equal, round, and reactive to light.   Cardiovascular:      Rate and Rhythm: Normal rate and regular rhythm.      Pulses: Normal pulses.      Heart sounds: Normal heart sounds.   Pulmonary:      Effort: Pulmonary effort is normal.      Breath sounds: Normal breath sounds.   Chest:   Breasts:     Breasts are symmetrical.      Right: Normal.      Left: Normal.   Abdominal:      General: Abdomen is flat. Bowel sounds are normal. There is no distension.      Palpations: Abdomen is soft. There is no hepatomegaly, splenomegaly or mass.      Tenderness: There is no abdominal tenderness.      Hernia: No hernia is present. There is no hernia in the left inguinal area or right inguinal area.   Genitourinary:     General: Normal vulva.      Pubic Area: No rash or pubic lice.       Labia:         Right: No rash, tenderness, lesion or injury.         Left: No rash, tenderness, lesion or injury.       Urethra: No prolapse, urethral pain, urethral swelling or urethral lesion.      Vagina: Normal. No signs of injury and foreign body. No vaginal discharge, erythema, tenderness, bleeding, lesions or prolapsed vaginal walls.      Cervix: Normal.      Uterus: Normal.       Adnexa: Right adnexa normal and left adnexa normal.        Right: No mass or tenderness.          Left: No mass or tenderness.        Rectum: Normal. Guaiac result negative. No mass, tenderness or anal fissure.      Comments: The external genitalia normal limits the vagina is clean cervix is parous but closed uterus is midposition normal size there is no cervical motion tenderness.  There is no evidence of prolapse.  A Pap smear was performed we will also will  screen for GC and chlamydia.  The urethra and bladder normal working relationship.  Musculoskeletal:         General: Normal range of motion.      Cervical back: Normal range of motion and neck supple.   Lymphadenopathy:      Upper Body:      Right upper body: No supraclavicular or axillary adenopathy.      Left upper body: No supraclavicular or axillary adenopathy.      Lower Body: No right inguinal adenopathy. No left inguinal adenopathy.   Skin:     General: Skin is warm and dry.   Neurological:      General: No focal deficit present.      Mental Status: She is alert and oriented to person, place, and time.   Psychiatric:         Mood and Affect: Mood normal.         Behavior: Behavior normal.

## 2024-12-04 ENCOUNTER — OFFICE VISIT (OUTPATIENT)
Dept: PHYSICAL THERAPY | Facility: CLINIC | Age: 69
End: 2024-12-04
Payer: MEDICARE

## 2024-12-04 DIAGNOSIS — Z47.89 ENCOUNTER FOR OTHER ORTHOPEDIC AFTERCARE: Primary | ICD-10-CM

## 2024-12-04 DIAGNOSIS — M75.101 TEAR OF RIGHT SUPRASPINATUS TENDON: ICD-10-CM

## 2024-12-04 LAB
C TRACH DNA SPEC QL NAA+PROBE: NEGATIVE
N GONORRHOEA DNA SPEC QL NAA+PROBE: NEGATIVE

## 2024-12-04 PROCEDURE — 97110 THERAPEUTIC EXERCISES: CPT | Performed by: PHYSICAL THERAPIST

## 2024-12-04 PROCEDURE — 97140 MANUAL THERAPY 1/> REGIONS: CPT | Performed by: PHYSICAL THERAPIST

## 2024-12-04 NOTE — PROGRESS NOTES
"Daily Note     Today's date: 2024  Patient name: Kasie Kimble  : 1955  MRN: [id number]  Referring provider: Mitchell Grace*  Dx:   Encounter Diagnosis     ICD-10-CM    1. Encounter for other orthopedic aftercare  Z47.89       2. Tear of right supraspinatus tendon  M75.101                      Subjective:  Pt reports her shoulder is sore upon arrival to skilled PT. Pain rated 4/10. Pt is being cautious and deliberate with using her arm. Pt performs exercises and movements slowly and carefully. Pt is compliant in HEP.      Objective: See treatment diary below      Assessment: Tolerated treatment well. Patient exhibited good technique with therapeutic exercises. Pt is improving nicely with passive flexion and scaption ROM. ER and ER PROM continues to be limited.      Plan: Continue per plan of care. Focus on improving ROM     Dx: s/p R shoulder RTC repair, biceps tenodesis, SAD (24)  EPOC: 25  CO-MORBIDITIES:  PERSONAL FACTORS: wants to return to weight lifting and exercise  Precautions: see MD protocol; no active R elbow ROM flex or supination  Access Code: [email or web address]    Manuals    R shoulder PROM/ MFR 10 HA wdc 10' Wdc 5 Wdc 4 JK 13'   GHJ mobs grade I-II inf/post    2'    2'   R elbow PROM 5 HA wdc  Wdc 5 Wdc 4 JK    Cervical MFR 5 HA         Progress note    8'       Neuro Re-Ed           Scap depression       10x10\"    SL AA abd w/ scap mov't       10x 10   SL ER       10 hold                                               Ther Ex           Pendulums: CW, CCW 20 ea 20 ea 20x 20 ea 20 ea 20 ea 20 ea 15 1#   Std shld flexion AROM  3x20\" 3x20\" 3x20\" 10 2x10 2x10 2x10 10   Std scap AROM 3x20\" 3x20\" 3x20\" 5 2x10 2x10 2x10 10   Supine shld flexion AAROM HH 10x5\" 10x5\" 20x5\" 10x5\" 2x10 2x10 AROM 10 10x5\" 1#   Table slides: flexion, scap, ER 10x5\" ea 10x5\" ea 10x5\" ea  10x 5\" ea 10x 5 ea  10x5\" ea   Seated cane ER AAROM 10 supine 10 supine " "10 supine 10x5\" supine 10x5\" supine 10x5\" supine  10x5\" supine   Pulleys: flexion ROM 2' 2' 3' 3' 3' 3' 3' 3'   Pulleys: scaption ROM 2' 2' 3' 3' 3' 3' 3' 3'   Isometric 4dir UE 5\" 10 5\"x10 5\" 10 5\"x5 5\" 10 5\" 10 hep    Supine ER       peachTB 20 hold   Supine shld  horz abduction       peachTB 20 Hold NV p!   SL scap depression       10 x10\" 10x10\"   ER butterfly stretch        10\"x5              Ther Activity                                 Gait Training                                 Modalities           MHP supine post      8'  10'   TENS -prn                                        "

## 2024-12-05 ENCOUNTER — OFFICE VISIT (OUTPATIENT)
Dept: PHYSICAL THERAPY | Facility: CLINIC | Age: 69
End: 2024-12-05
Payer: MEDICARE

## 2024-12-05 DIAGNOSIS — M75.101 TEAR OF RIGHT SUPRASPINATUS TENDON: ICD-10-CM

## 2024-12-05 DIAGNOSIS — Z47.89 ENCOUNTER FOR OTHER ORTHOPEDIC AFTERCARE: Primary | ICD-10-CM

## 2024-12-05 PROCEDURE — 97110 THERAPEUTIC EXERCISES: CPT

## 2024-12-05 PROCEDURE — 97140 MANUAL THERAPY 1/> REGIONS: CPT

## 2024-12-05 PROCEDURE — 97014 ELECTRIC STIMULATION THERAPY: CPT

## 2024-12-05 NOTE — PROGRESS NOTES
Daily Note     Today's date: 2024  Patient name: Kasie Kimble  : 1955  MRN: [id number]  Referring provider: Mitchell Grace*  Dx:   Encounter Diagnosis     ICD-10-CM    1. Encounter for other orthopedic aftercare  Z47.89       2. Tear of right supraspinatus tendon  M75.101                      Subjective:   Current Status: increased pain and discomfort, unsure what did her in  New Symptoms/ Problems/ Changes to Established Plan: soreness/ arm heavy/ decrease ROM.  Response to Last Treatment - sore since last two visits, unsure what did her in  HEP/ Activity Recommendations- nn/a  Progress Towards Goals: wants to return to weight lifting and exercise        Objective: See treatment diary below      Assessment:   Response to manual treatment: fair, pt tolerated modalities much better.   Form with exercises: fair 2* increased pain.   Response to exercises: poor  Modifications in treatment: focused on pain management and held on exercise per increased pain/discomfort prior to PT session.   Recommendations - hold off from exercises 1-2days, focus on MHP and stretches.         Plan: Continue per plan of care.          Precautions: see MD protocol  CO-MORBIDITIES:  HEP ACCESS CODE:  [id number]  FOTO Completed On:  (Score:  47  Score Predication:  66 )      POC Expires Reeval for Medicare to be completed Unit LImit Auth Expiration Date PT/OT/STVisit Limit   25 By visit  25 N/a N/a N/a    Completed on 24                 TREATMENT DIARY  Auth Status DATE         Approved Visit # 20         Remaining N/a        MANUAL THERAPY         GHJ mobs grade I-II inf/post 2        PROM 10                                            THERAPEUTIC EXERCISE HEP         Pulleys: Scaption  3'        Pulleys: Flexion  3'        Pendulums: CW, CCW  1# 20 ea        Standing Scaption          Standing Flexion          Table slides: flexion, scap, ER  10x ea                  Supine shld flexion AAROM HH           Supine cane ER AAROM                                                                      NEUROMUSCULAR REEDUCATION           Seated Scapular Depression                                                                                                                                            THERAPEUTIC ACTIVITY                                                  GAIT TRAINING                                                  MODALITIES         MHP + TENs post supine 10'

## 2024-12-06 ENCOUNTER — APPOINTMENT (OUTPATIENT)
Dept: PHYSICAL THERAPY | Facility: CLINIC | Age: 69
End: 2024-12-06
Payer: MEDICARE

## 2024-12-06 LAB
LAB AP GYN PRIMARY INTERPRETATION: NORMAL
Lab: NORMAL

## 2024-12-11 ENCOUNTER — OFFICE VISIT (OUTPATIENT)
Dept: PHYSICAL THERAPY | Facility: CLINIC | Age: 69
End: 2024-12-11
Payer: MEDICARE

## 2024-12-11 DIAGNOSIS — Z47.89 ENCOUNTER FOR OTHER ORTHOPEDIC AFTERCARE: Primary | ICD-10-CM

## 2024-12-11 DIAGNOSIS — M75.101 TEAR OF RIGHT SUPRASPINATUS TENDON: ICD-10-CM

## 2024-12-11 PROCEDURE — 97140 MANUAL THERAPY 1/> REGIONS: CPT | Performed by: PHYSICAL THERAPIST

## 2024-12-11 PROCEDURE — 97014 ELECTRIC STIMULATION THERAPY: CPT | Performed by: PHYSICAL THERAPIST

## 2024-12-11 PROCEDURE — 97110 THERAPEUTIC EXERCISES: CPT | Performed by: PHYSICAL THERAPIST

## 2024-12-11 NOTE — PROGRESS NOTES
"Daily Note     Today's date: 2024  Patient name: Kasie Kimble  : 1955  MRN: [id number]  Referring provider: No ref. provider found  Dx:   Encounter Diagnosis     ICD-10-CM    1. Encounter for other orthopedic aftercare  Z47.89       2. Tear of right supraspinatus tendon  M75.101                      Subjective: Pt has a dull ache in her shoulder upon arrival to therapy. Pt believes she is sleeping on her R side and causing some soreness. Pt is compliant in HEP. Pt was able to drive to [facility] independently. Pt reports she had a severe muscle cramp when standing to watch a parade. Pt needed to push her back against a street sign to relieve her sx.      Objective: See treatment diary below      Assessment: Tolerated treatment well. Patient exhibited good technique with therapeutic exercises Focused on improving PROM. Shoulder flexion PROM measured at 140*. Pt continues to be tight with all planes of movement, but it improved post manuals. Estim performed for pain control.      Plan: Continue per plan of care. Focus on decreasing pain and improving PROM     Precautions: see MD protocol  CO-MORBIDITIES:  HEP ACCESS CODE:  [id number]  FOTO Completed On:  (Score:  47  Score Predication:  66 )      POC Expires Reeval for Medicare to be completed Unit LImit Auth Expiration Date PT/OT/STVisit Limit   25 By visit  24 N/a N/a N/a    Completed on 24                 TREATMENT DIARY  Auth Status DATE        Approved Visit # 20 21        Remaining N/a        MANUAL THERAPY         GHJ mobs grade II inf/post 2 5'       R shoulder PROM/ MFR 10 15'       R upper trap/ rhomboid MFR  5'                                  THERAPEUTIC EXERCISE HEP         Pulleys: Scaption  3' 3'       Pulleys: Flexion  3' 3'       Pendulums: CW, CCW  1# 20 ea 1# x15       Standing Scaption          Standing Flexion          Table slides: flexion, scap, ER  10x ea 10 x5\" ea                 Supine shld " "flexion AAROM HH   10x5\"       Supine cane ER AAROM   10x5\"                                                                   NEUROMUSCULAR REEDUCATION           Seated Scapular Depression                                                                                                                                            THERAPEUTIC ACTIVITY                                                  GAIT TRAINING                                                  MODALITIES         MHP + TENs post supine 10' 10' sit                                             " Statement Selected

## 2024-12-13 ENCOUNTER — OFFICE VISIT (OUTPATIENT)
Dept: PHYSICAL THERAPY | Facility: CLINIC | Age: 69
End: 2024-12-13
Payer: MEDICARE

## 2024-12-13 DIAGNOSIS — Z47.89 ENCOUNTER FOR OTHER ORTHOPEDIC AFTERCARE: Primary | ICD-10-CM

## 2024-12-13 DIAGNOSIS — M75.101 TEAR OF RIGHT SUPRASPINATUS TENDON: ICD-10-CM

## 2024-12-13 PROCEDURE — 97140 MANUAL THERAPY 1/> REGIONS: CPT

## 2024-12-13 PROCEDURE — 97014 ELECTRIC STIMULATION THERAPY: CPT

## 2024-12-13 PROCEDURE — 97110 THERAPEUTIC EXERCISES: CPT

## 2024-12-13 NOTE — PROGRESS NOTES
"Daily Note     Today's date: 2024  Patient name: Kasie Kimble  : 1955  MRN: [id number]  Referring provider: No ref. provider found  Dx:   Encounter Diagnosis     ICD-10-CM    1. Encounter for other orthopedic aftercare  Z47.89       2. Tear of right supraspinatus tendon  M75.101                      Subjective: Pt states her shoulder feels stiff.       Objective: See treatment diary below      Assessment: Focus on manuals primarily focusing on the stretching component, encourage pt to use her hand and stone massage credits to aid in her ms tightness. In addition, introduced new TE to focus on her ROM.       Plan: Continue per plan of care. Focus on decreasing pain and improving PROM     Precautions: see MD protocol  CO-MORBIDITIES:  HEP ACCESS CODE:  [id number]  FOTO Completed On:  (Score:  47  Score Predication:  66 )      POC Expires Reeval for Medicare to be completed Unit LImit Auth Expiration Date PT/OT/STVisit Limit   25 By visit  24 N/a N/a N/a    Completed on 24                 TREATMENT DIARY  Auth Status DATE       Approved Visit # 20 21        Remaining N/a        MANUAL THERAPY         GHJ mobs grade II inf/post 2 5'       R shoulder PROM/ MFR 10 15' 15      R upper trap/ rhomboid MFR  5'                                  THERAPEUTIC EXERCISE HEP         Pulleys: Scaption  3' 3' 3'      Pulleys: Flexion  3' 3' 3'      Pendulums: CW, CCW  1# 20 ea 1# x15       Standing Scaption          Standing Flexion          Table slides: flexion, scap, ER  10x ea 10 x5\" ea                 Supine shld flexion AAROM HH   10x5\" 10x5\"      Supine butterfly stretch    10\" 5      Supine cane ER AAROM   10x5\"       Standing IR stretch with strap    10\" 5      Ball pass behind trunk     10x                                              NEUROMUSCULAR REEDUCATION           Seated Scapular Depression                                                                                 "                                                            THERAPEUTIC ACTIVITY                                                  GAIT TRAINING                                                  MODALITIES         MHP + TENs post supine 10' 10' sit 10' sit

## 2024-12-18 ENCOUNTER — EVALUATION (OUTPATIENT)
Dept: PHYSICAL THERAPY | Facility: CLINIC | Age: 69
End: 2024-12-18
Payer: MEDICARE

## 2024-12-18 DIAGNOSIS — Z47.89 ENCOUNTER FOR OTHER ORTHOPEDIC AFTERCARE: Primary | ICD-10-CM

## 2024-12-18 DIAGNOSIS — M75.101 TEAR OF RIGHT SUPRASPINATUS TENDON: ICD-10-CM

## 2024-12-18 PROCEDURE — 97014 ELECTRIC STIMULATION THERAPY: CPT | Performed by: PHYSICAL THERAPIST

## 2024-12-18 PROCEDURE — 97140 MANUAL THERAPY 1/> REGIONS: CPT | Performed by: PHYSICAL THERAPIST

## 2024-12-18 PROCEDURE — 97110 THERAPEUTIC EXERCISES: CPT | Performed by: PHYSICAL THERAPIST

## 2024-12-18 NOTE — PROGRESS NOTES
PT Re-Evaluation     Today's date: 2024  Patient name: Kasie Kimble  : 1955  MRN: [id number]  Referring provider: No ref. provider found  Dx:   Encounter Diagnosis     ICD-10-CM    1. Encounter for other orthopedic aftercare  Z47.89       2. Tear of right supraspinatus tendon  M75.101                      Assessment  Impairments: abnormal muscle tone, abnormal or restricted ROM, activity intolerance, impaired physical strength and pain with function    Assessment details: Since starting skilled PT, pain levels are decreased, R shoulder ROM is improving, however pt continues to have stiffness in flexion and ER. R shoulder AROM is gradually improving with gradual functional progress. Recommend pt continue skilled PT according to MD protocol.  Understanding of Dx/Px/POC: good     Prognosis: good    Goals  STG's ( 3-4 weeks)  1. Pt will be independent in HEP-met  2. Pt will have improved R shoulder flex and scaption PROM to 90* in 4 weeks- met  LTG's ( 6- 8 weeks)  1. Improve FOTO score by 8-10 points in 6-8 weeks- not met  2. Pt will have decreased pain to 3/10 at worst- met  3. Pt will have improved R shoulder AROM by 10-15*- partial met  4. Pt will be able to sleep in bed with greater ease- partial met  5. Pt will return to driving independently.- met    Plan  Patient would benefit from: skilled physical therapy  Planned modality interventions: cryotherapy and TENS    Planned therapy interventions: manual therapy, joint mobilization, neuromuscular re-education, strengthening, stretching, therapeutic activities, therapeutic exercise, functional ROM exercises, flexibility and home exercise program    Frequency: 2x week  Duration in weeks: 8  Plan of Care beginning date: 2024  Plan of Care expiration date: 2025  Treatment plan discussed with: PTA and patient        Subjective Evaluation    History of Present Illness  Mechanism of injury: I.E; Pt had skilled PT for R shoulder for RTC  tendonitis and was discharged with resolution of symptoms May 2024. Pt started to experience increased R shoulder pain. MRI testing showed a full thickness supraspinatus tear and biceps tendon tear. Pt underwent RTC repair, biceps tenodesis and SAD on 24.   Pt arrives to skilled PT with her arm in a sling. Pt is limited with all functional activities involving her R UE. Pt is taking Advil and Tylenol every 3 hours. Pt has increased pain and difficulty sleeping and is currently sleeping in a reclining chair.     10/25/24; Pt is compliant in HEP. Pt has no change in functional activities 2* keeping her arm in the sling and following MD protocol. Pt has noticed that her elbow is not as painful when taking her arm out the sling to do her exercises.    24: Pt is compliant in HEP. Pt has returned to driving activities. Pt needs to bend over to wash her hair or complete dressing activities. Pt needs to put her R arm in first and put the head open over next. Pt had increased pain when sleeping at night last night that radiated down the back of her arm.    24: Pt is compliant in HEP. Pt is intentional with her reaching. Pt can feel increased pain with reaching activities. Pt notes washing her hair is pretty easy, but is still putting her R arm in first when dressing. Pt no longer has pain radiating down her arm. Pt can still have pain when sleeping at night. Pt was able to drive a longer distance to [place], [place]. Pt does not hesitate to drive.    Work; retired  Hobbies: traveling, bike riding, exercising  Gait; no abnormalities  Patient Goals  Patient goals for therapy: decreased pain  Patient goal: full ROM; to return to weight training for my shoulders and arm  Pain  At best pain ratin  At worst pain rating: 3  Location: R shoulder  Quality: dull ache    Social Support  Lives with: alone    Employment status: not working  Hand dominance: right      Diagnostic Tests  MRI studies:  abnormal  Treatments  Previous treatment: physical therapy (prior to surgery)        Objective     Neurological Testing     Sensation     Shoulder   Left Shoulder   Intact: light touch    Right Shoulder   Intact: Light touch    Reflexes   Left   Biceps (C5/C6): normal (2+)  Brachioradialis (C6): normal (2+)  Triceps (C7): normal (2+)    Additional Neurological Details  R UE reflexes NT    Active Range of Motion     Right Shoulder   Flexion: 105 degrees with pain  Abduction: 60 degrees with pain  External rotation 45°: 58 degrees   Internal rotation 45°: 60 degrees     Additional Active Range of Motion Details  R shoulder AROM: NT  R elbow AROM; NT    Passive Range of Motion     Right Shoulder   Flexion: 140 degrees   Abduction: 166 degrees   External rotation 45°: 58 degrees   Internal rotation 45°: 78 degrees     Right Elbow   Flexion: 130 degrees with pain  Extension: -10 degrees with pain    Additional Passive Range of Motion Details  (+) TTP R anterior shoulder  Increased muscle tone R upper trap/ levator scap  Cervical ROM limited with SB and rotation ROM  R shoulder strength; NT          Precautions: see MD protocol  CO-MORBIDITIES:  HEP ACCESS CODE:  [id number]  FOTO Completed On: 11/18 (Score:  47  Score Predication:  66 )      POC Expires Reeval for Medicare to be completed Unit LImit Auth Expiration Date PT/OT/STVisit Limit   12/12/25 By visit 35 N/a N/a N/a    Completed on 12/18/24                 TREATMENT DIARY  Auth Status DATE 12/5 12/11 12/13 12/18     Approved Visit # 20 21 22 23      Remaining N/a        MANUAL THERAPY         GHJ mobs grade II inf/post 2 5'  2'     R shoulder PROM/ MFR 10 15' 15 8'     R upper trap/ rhomboid MFR  5'  2'     Progress note    8'                       THERAPEUTIC EXERCISE HEP         Pulleys: Scaption  3' 3' 3' 3'     Pulleys: Flexion  3' 3' 3' 3'     Pendulums: CW, CCW  1# 20 ea 1# x15  1#x20     Standing Scaption          Standing Flexion          Table slides:  "flexion, scap, ER  10x ea 10 x5\" ea       Shoulder Flexion stretch on leg press     10\"x5     Supine shld flexion AAROM HH   10x5\" 10x5\" 10x5\" 3#     Supine butterfly stretch    10\" 5      Supine cane ER AAROM   10x5\"  10x5\"     Standing IR stretch with strap    10\" 5 10\"x5     Ball pass behind trunk     10x 10x                                             NEUROMUSCULAR REEDUCATION           TB LPD     OTB x10     TB row     OTB x10     TB IR     OTB x10     TB ER                                                                                                              THERAPEUTIC ACTIVITY                                                  GAIT TRAINING                                                  MODALITIES         MHP + TENs post supine 10' 10' sit 10' sit                                            "

## 2024-12-20 ENCOUNTER — OFFICE VISIT (OUTPATIENT)
Dept: PHYSICAL THERAPY | Facility: CLINIC | Age: 69
End: 2024-12-20
Payer: MEDICARE

## 2024-12-20 DIAGNOSIS — M75.101 TEAR OF RIGHT SUPRASPINATUS TENDON: ICD-10-CM

## 2024-12-20 DIAGNOSIS — Z47.89 ENCOUNTER FOR OTHER ORTHOPEDIC AFTERCARE: Primary | ICD-10-CM

## 2024-12-20 PROCEDURE — 97110 THERAPEUTIC EXERCISES: CPT

## 2024-12-20 PROCEDURE — 97140 MANUAL THERAPY 1/> REGIONS: CPT

## 2024-12-20 NOTE — PROGRESS NOTES
"Daily Note     Today's date: 2024  Patient name: Kasie Kimble  : 1955  MRN: [id number]  Referring provider: Mitchell Grace*  Dx:   Encounter Diagnosis     ICD-10-CM    1. Encounter for other orthopedic aftercare  Z47.89       2. Tear of right supraspinatus tendon  M75.101                      Subjective:  Patient states that she is doing alright, she is feeling a little stiff today.       Objective: See treatment diary below      Assessment: Tolerated treatment well. Continued with prescribed POC. Most limitations into shoulder flexion and shoulder external rotation with PROM. Patient requested heat to conclude her session today. Patient demonstrated fatigue post treatment, exhibited good technique with therapeutic exercises, and would benefit from continued PT      Plan: Continue per plan of care.      Precautions: see MD protocol  CO-MORBIDITIES:  HEP ACCESS CODE:  [id number]  FOTO Completed On:  (Score:  47  Score Predication:  66 )      POC Expires Reeval for Medicare to be completed Unit LImit Auth Expiration Date PT/OT/STVisit Limit   25 By visit  24 N/a N/a N/a    Completed on 24                 TREATMENT DIARY  Auth Status DATE      Approved Visit # 20 21 22 23      Remaining N/a        MANUAL THERAPY         GHJ mobs grade II inf/post 2 5'       R shoulder PROM/ MFR 10 15' 15 15     R upper trap/ rhomboid MFR  5'                                  THERAPEUTIC EXERCISE HEP         Pulleys: Scaption  3' 3' 3' 3'      Pulleys: Flexion  3' 3' 3' 3'     Pendulums: CW, CCW  1# 20 ea 1# x15       Standing Scaption          Standing Flexion          Table slides: flexion, scap, ER  10x ea 10 x5\" ea                 Supine shld flexion AAROM HH   10x5\" 10x5\" 10x5\"     Supine butterfly stretch    10\" 5 10\"x5     Supine cane ER AAROM   10x5\"       Standing IR stretch with strap    10\" 5 10\"x5     Ball pass behind trunk     10x 10x                        "                       NEUROMUSCULAR REEDUCATION           Seated Scapular Depression                                                                                                                                            THERAPEUTIC ACTIVITY                                                  GAIT TRAINING                                                  MODALITIES         MHP + TENs post supine 10' 10' sit 10' sit 10 min post

## 2024-12-23 ENCOUNTER — OFFICE VISIT (OUTPATIENT)
Dept: PHYSICAL THERAPY | Facility: CLINIC | Age: 69
End: 2024-12-23
Payer: MEDICARE

## 2024-12-23 DIAGNOSIS — M75.101 TEAR OF RIGHT SUPRASPINATUS TENDON: ICD-10-CM

## 2024-12-23 DIAGNOSIS — Z47.89 ENCOUNTER FOR OTHER ORTHOPEDIC AFTERCARE: Primary | ICD-10-CM

## 2024-12-23 PROCEDURE — 97110 THERAPEUTIC EXERCISES: CPT

## 2024-12-23 PROCEDURE — 97140 MANUAL THERAPY 1/> REGIONS: CPT

## 2024-12-23 NOTE — PROGRESS NOTES
"Daily Note     Today's date: 2024  Patient name: Kasie Kimble  : 1955  MRN: [id number]  Referring provider: Mitchell Grace*  Dx:   Encounter Diagnosis     ICD-10-CM    1. Encounter for other orthopedic aftercare  Z47.89       2. Tear of right supraspinatus tendon  M75.101                      Subjective:  Patient states she is doing much better, feeling like her ROM is improving greatly.       Objective: See treatment diary below      Assessment: Progressed below TE by increasing reps with great tolerance. In addition, noted pt was less guarded during PROM compared to previous visits. PRN tens and mhp, focus on strengthening and ROM .      Plan: Continue per plan of care.      Precautions: see MD protocol  CO-MORBIDITIES:  HEP ACCESS CODE:  [id number]  FOTO Completed On:  (Score:  47  Score Predication:  66 )      POC Expires Reeval for Medicare to be completed Unit LImit Auth Expiration Date PT/OT/STVisit Limit   25 By visit  24 N/a N/a N/a    Completed on 24                 TREATMENT DIARY  Auth Status DATE     Approved Visit # 20  23 24     Remaining N/a        MANUAL THERAPY         GHJ mobs grade II inf/post 2 5'       R shoulder PROM/ MFR 10 15' 15 15 20    R upper trap/ rhomboid MFR  5'                                  THERAPEUTIC EXERCISE HEP         Pulleys: Scaption  3' 3' 3' 3'  3'    Pulleys: Flexion  3' 3' 3' 3' 3'    Pendulums: CW, CCW  1# 20 ea 1# x15   1# 15    Standing Scaption          Standing Flexion          Table slides: flexion, scap, ER  10x ea 10 x5\" ea   15x5\"              Supine shld flexion AAROM HH   10x5\" 10x5\" 10x5\" 15x5\"    Supine butterfly stretch    10\" 5 10\"x5 10\" 5    Supine cane ER AAROM   10x5\"       Standing IR stretch with strap    10\" 5 10\"x5 10\" 5    Ball pass behind trunk     10x 10x  15x                                            NEUROMUSCULAR REEDUCATION           Seated Scapular Depression      "                                                                                                                                       THERAPEUTIC ACTIVITY                                                  GAIT TRAINING                                                  MODALITIES         MHP + TENs post supine 10' 10' sit 10' sit 10 min post prn

## 2024-12-27 ENCOUNTER — OFFICE VISIT (OUTPATIENT)
Dept: PHYSICAL THERAPY | Facility: CLINIC | Age: 69
End: 2024-12-27
Payer: MEDICARE

## 2024-12-27 DIAGNOSIS — Z47.89 ENCOUNTER FOR OTHER ORTHOPEDIC AFTERCARE: Primary | ICD-10-CM

## 2024-12-27 DIAGNOSIS — M75.101 TEAR OF RIGHT SUPRASPINATUS TENDON: ICD-10-CM

## 2024-12-27 PROCEDURE — 97140 MANUAL THERAPY 1/> REGIONS: CPT

## 2024-12-27 PROCEDURE — 97112 NEUROMUSCULAR REEDUCATION: CPT

## 2024-12-27 PROCEDURE — 97110 THERAPEUTIC EXERCISES: CPT

## 2024-12-27 NOTE — PROGRESS NOTES
"Daily Note     Today's date: 2024  Patient name: Kasie Kimble  : 1955  MRN: [id number]  Referring provider: Mitchell Grace*  Dx:   Encounter Diagnosis     ICD-10-CM    1. Encounter for other orthopedic aftercare  Z47.89       2. Tear of right supraspinatus tendon  M75.101                      Subjective:  Patient states she is doing much better, feeling like her ROM is improving greatly.       Objective: See treatment diary below      Assessment: Progressed below TE and introduced new TE with great tolerance. No complain of pain or discomfort throughout PT session.       Plan: Continue per plan of care.      Precautions: see MD protocol  CO-MORBIDITIES:  HEP ACCESS CODE:  [id number]  FOTO Completed On:  (Score:  47  Score Predication:  66 )      POC Expires Reeval for Medicare to be completed Unit LImit Auth Expiration Date PT/OT/STVisit Limit   25 By visit  24 N/a N/a N/a    Completed on 24                 TREATMENT DIARY  Auth Status DATE    Approved Visit # 20 21 22 23 24 25    Remaining N/a        MANUAL THERAPY         GHJ mobs grade II inf/post 2 5'       R shoulder PROM/ MFR 10 15' 15 15 20 15   R upper trap/ rhomboid MFR  5'                                  THERAPEUTIC EXERCISE HEP         Pulleys: Scaption  3' 3' 3' 3'  3' 3'   Pulleys: Flexion  3' 3' 3' 3' 3' 3'   Pendulums: CW, CCW  1# 20 ea 1# x15   1# 15 1# 20   Standing Scaption          Standing Flexion          Table slides: flexion, scap, ER  10x ea 10 x5\" ea   15x5\" 20x5\"             Supine shld flexion AAROM HH   10x5\" 10x5\" 10x5\" 15x5\" 20x   Supine butterfly stretch    10\" 5 10\"x5 10\" 5 15\" 5   Supine cane ER AAROM   10x5\"       Standing IR stretch with strap    10\" 5 10\"x5 10\" 5 10\" 5   Ball pass behind trunk     10x 10x  15x 20x   Bicep curls       0# 20x                                 NEUROMUSCULAR REEDUCATION           Mod Rows       0# 20   Mod Ext       0# 20 "   Supine punches       0# 20                                                                                                                 THERAPEUTIC ACTIVITY                                                  GAIT TRAINING                                                  MODALITIES         MHP + TENs post supine 10' 10' sit 10' sit 10 min post prn 10 min post

## 2024-12-31 ENCOUNTER — OFFICE VISIT (OUTPATIENT)
Dept: PHYSICAL THERAPY | Facility: CLINIC | Age: 69
End: 2024-12-31
Payer: MEDICARE

## 2024-12-31 DIAGNOSIS — M75.101 TEAR OF RIGHT SUPRASPINATUS TENDON: ICD-10-CM

## 2024-12-31 DIAGNOSIS — Z47.89 ENCOUNTER FOR OTHER ORTHOPEDIC AFTERCARE: Primary | ICD-10-CM

## 2024-12-31 PROCEDURE — 97110 THERAPEUTIC EXERCISES: CPT | Performed by: PHYSICAL THERAPIST

## 2024-12-31 PROCEDURE — 97140 MANUAL THERAPY 1/> REGIONS: CPT | Performed by: PHYSICAL THERAPIST

## 2024-12-31 PROCEDURE — 97112 NEUROMUSCULAR REEDUCATION: CPT | Performed by: PHYSICAL THERAPIST

## 2024-12-31 NOTE — PROGRESS NOTES
"Daily Note     Today's date: 2024  Patient name: Kasie Kimble  : 1955  MRN: [id number]  Referring provider: Mitchell Grace*  Dx:   Encounter Diagnosis     ICD-10-CM    1. Encounter for other orthopedic aftercare  Z47.89       2. Tear of right supraspinatus tendon  M75.101                      Subjective: Pt reports feeling increased soreness this morning when she woke up. Pt notes she often feels sore in the morning, and then the pain reduces throughout the day. Pt feels like she has \" turned a corner\" in therapy. Pt is being deliberate with using her right shoulder for functional tasks.      Objective: See treatment diary below      Assessment: Tolerated treatment well. Patient exhibited good technique with therapeutic exercises. Initiated tband ex and s/l ER AROM ex. Pt feels relief with MH post tx. Pt tolerated manual therapy will with stretching and mild pain at end ROM. Issued updated HEP.      Plan: Continue per plan of care. Continue to progress according to MD protocol.     Precautions: see MD protocol  CO-MORBIDITIES:  HEP ACCESS CODE:  [id number]  FOTO Completed On:  (Score:  47  Score Predication:  66 )      POC Expires Reeval for Medicare to be completed Unit LImit Auth Expiration Date PT/OT/STVisit Limit   25 By visit  24 N/a N/a N/a    Completed on 24                 TREATMENT DIARY  Auth Status DATE    Approved Visit # 20 21 22 23 24 25 26    Remaining N/a         MANUAL THERAPY          GHJ mobs grade II inf/post 2 5'     5'   R shoulder PROM/ MFR 10 15' 15 15 20 15 15'   R upper trap/ rhomboid MFR  5'                                      THERAPEUTIC EXERCISE HEP          Pulleys: Scaption  3' 3' 3' 3'  3' 3' 3'   Pulleys: Flexion  3' 3' 3' 3' 3' 3' 3'   Pendulums: CW, CCW  1# 20 ea 1# x15   1# 15 1# 20 2# x15   Standing Scaption        15   Standing Flexion        15   Table slides: flexion, scap, ER  10x ea 10 x5\" " "ea   15x5\" 20x5\"               Supine shld flexion AAROM HH   10x5\" 10x5\" 10x5\" 15x5\" 20x 10x5\" 1#   Supine butterfly stretch    10\" 5 10\"x5 10\" 5 15\" 5    Supine cane ER AAROM   10x5\"     10x5\"   Standing IR stretch with strap    10\" 5 10\"x5 10\" 5 10\" 5    Ball pass behind trunk     10x 10x  15x 20x 20   Bicep curls       0# 20x    S/l ER AROM        10                         NEUROMUSCULAR REEDUCATION            Mod Rows       0# 20 1# x10   Mod Ext       0# 20 1# x10   Supine punches       0# 20 1# x10   TB LPD        OTB x10   TB row        OTB x10   TB IR        OTB x10   TB ER        OTB x10                                                                                THERAPEUTIC ACTIVITY                                                       GAIT TRAINING                                                       MODALITIES          MHP + TENs post supine 10' 10' sit 10' sit 10 min post prn 10 min post 10' post                                              "

## 2025-01-03 ENCOUNTER — OFFICE VISIT (OUTPATIENT)
Dept: PHYSICAL THERAPY | Facility: CLINIC | Age: 70
End: 2025-01-03
Payer: MEDICARE

## 2025-01-03 DIAGNOSIS — Z47.89 ENCOUNTER FOR OTHER ORTHOPEDIC AFTERCARE: Primary | ICD-10-CM

## 2025-01-03 DIAGNOSIS — M75.101 TEAR OF RIGHT SUPRASPINATUS TENDON: ICD-10-CM

## 2025-01-03 PROCEDURE — 97112 NEUROMUSCULAR REEDUCATION: CPT

## 2025-01-03 PROCEDURE — 97140 MANUAL THERAPY 1/> REGIONS: CPT

## 2025-01-03 PROCEDURE — 97110 THERAPEUTIC EXERCISES: CPT

## 2025-01-03 NOTE — PROGRESS NOTES
"Daily Note     Today's date: 1/3/2025  Patient name: Kasie Kimble  : 1955  MRN: [id number]  Referring provider: Mitchell Grace*  Dx:   Encounter Diagnosis     ICD-10-CM    1. Encounter for other orthopedic aftercare  Z47.89       2. Tear of right supraspinatus tendon  M75.101                      Subjective:   Current Status: feeling better.  New Symptoms/ Problems/ Changes to Established Plan: feels like she has turned the corner in a positive way.  Response to Last Treatment - good  HEP/ Activity Recommendations- nn/a  Progress Towards Goals: wants to return to weight lifting and exercise        Objective: See treatment diary below      Assessment:   Response to manual treatment: good tolerance, pt prefers to end PT session with MHP in supine.  Form with exercises: good form  Response to exercises: Introduced new TE with great tolerance, pt is highly motivated to get better and stronger.   Modifications in treatment: n/a  Recommendations - continue HEP      Plan: Continue per plan of care. Continue to progress according to MD protocol.     Precautions: PHASE 3 PER MD protocol below flowsheet  CO-MORBIDITIES:  HEP ACCESS CODE:  [id number]  FOTO Completed On: 1/3 (Score:  63  Score Predication:  66 )      POC Expires Reeval for Medicare to be completed Unit LImit Auth Expiration Date PT/OT/STVisit Limit   25 By visit  24 N/a N/a N/a    Completed on 24                 TREATMENT DIARY  Auth Status DATE 12/27 12/31 1/3   Approved Visit # 25 26     Remaining      MANUAL THERAPY      GHJ mobs grade II inf/post  5'    R shoulder PROM/ MFR 15 15' 8'   R upper trap/ rhomboid MFR                        THERAPEUTIC EXERCISE HEP      Pulleys: Scaption  3' 3' 20x   Pulleys: Flexion  3' 3' 20x   Pendulums: CW, CCW  1# 20 2# x15 2# 20   Standing Scaption   15 15   Standing Flexion   15 15   Bicep curls  0# 20x  2# 20   Hammer Curls    2# 20   Standing IR stretch with strap  10\" 5  10\" 5        "   Supine AROM Flex    0# 20   S/l AROM ABD    0# 20   S/l AROM ER   10 0# 20                                                           NEUROMUSCULAR REEDUCATION        Mod Rows  0# 20 1# x10 1# 20   Mod Ext  0# 20 1# x10 1# 20   Supine punches  0# 20 1# x10    TB LPD   OTB x10 OTB 20   TB row   OTB x10 OTB 20   TB IR   OTB x10 OTB 20   TB ER   OTB x10 OTB 20   Wall slides: fwd/scap    10x ea                                             THERAPEUTIC ACTIVITY       5 Cone placing fwd on shelf    2x   5 cone placing side on shelf    1x   Walking around clinic, 90/90    2X          GAIT TRAINING                                   MODALITIES      MHP + TENs post supine 10 min post 10' post 10' POST                         Media Information              Document Information    Other: Other   Surgery Protcol   12/27/2024   Attached To:   Office Visit on 12/27/24 with Anamaria Bryant PTA   Source Information    [facility]  [facility]   Document History

## 2025-01-06 ENCOUNTER — OFFICE VISIT (OUTPATIENT)
Dept: OBGYN CLINIC | Facility: OTHER | Age: 70
End: 2025-01-06
Payer: MEDICARE

## 2025-01-06 VITALS — BODY MASS INDEX: 24.48 KG/M2 | HEIGHT: 62 IN | WEIGHT: 133 LBS

## 2025-01-06 DIAGNOSIS — Z98.890 S/P RIGHT ROTATOR CUFF REPAIR: ICD-10-CM

## 2025-01-06 DIAGNOSIS — M75.101 TEAR OF RIGHT SUPRASPINATUS TENDON: Primary | ICD-10-CM

## 2025-01-06 PROCEDURE — 99213 OFFICE O/P EST LOW 20 MIN: CPT | Performed by: ORTHOPAEDIC SURGERY

## 2025-01-06 NOTE — PROGRESS NOTES
"  Assessment  Diagnoses and all orders for this visit:    Tear of right supraspinatus tendon    S/P right rotator cuff repair      Discussion and Plan:    Patient is progressing nicely on exam today.  Continue PT per protocol, wean to HEP under the guidance of the therapist.  Patient may progress activities as tolerated to include bicycling   Follow up as needed.       Subjective:   Patient ID: Kasie Kimble is a 69 y.o. female      HPI  Patient presents today 3 mos s/p right shoulder rotator cuff repair, biceps tenodesis, SAD 9/25/24.  Patient is attending PT as instructed.  Patient reports she feels better than prior to surgery.  States \"It's a different pain now\"      The following portions of the patient's history were reviewed and updated as appropriate: allergies, current medications, past family history, past medical history, past social history, past surgical history and problem list.      Objective:  Ht 5' 2\" (1.575 m)   Wt 60.3 kg (133 lb)   BMI 24.33 kg/m²       Right shoulder MUSCULOSKELETAL EXAMINATION:  Incision: Clean, dry, intact and healed  Surgery Site: normal, no evidence of infection   Range of Motion: As expected with good resistance   Neurovascular status: Neuro intact, good cap refill  Activity Restrictions:  per protocol         Physical Exam  Vitals reviewed.   Constitutional:       Appearance: She is well-developed.   Eyes:      Pupils: Pupils are equal, round, and reactive to light.   Pulmonary:      Effort: Pulmonary effort is normal.      Breath sounds: Normal breath sounds.   Abdominal:      General: Abdomen is flat. There is no distension.   Skin:     General: Skin is warm and dry.   Neurological:      Mental Status: She is alert and oriented to person, place, and time.   Psychiatric:         Behavior: Behavior normal.         Thought Content: Thought content normal.         Judgment: Judgment normal.           Scribe Attestation      I,:  Evi Miller MA am acting as a " scribe while in the presence of the attending physician.:       I,:  Mitchell Grace MD personally performed the services described in this documentation    as scribed in my presence.:

## 2025-01-07 ENCOUNTER — OFFICE VISIT (OUTPATIENT)
Dept: PHYSICAL THERAPY | Facility: CLINIC | Age: 70
End: 2025-01-07
Payer: MEDICARE

## 2025-01-07 DIAGNOSIS — Z47.89 ENCOUNTER FOR OTHER ORTHOPEDIC AFTERCARE: Primary | ICD-10-CM

## 2025-01-07 DIAGNOSIS — M75.101 TEAR OF RIGHT SUPRASPINATUS TENDON: ICD-10-CM

## 2025-01-07 PROCEDURE — 97112 NEUROMUSCULAR REEDUCATION: CPT

## 2025-01-07 PROCEDURE — 97140 MANUAL THERAPY 1/> REGIONS: CPT

## 2025-01-07 PROCEDURE — 97110 THERAPEUTIC EXERCISES: CPT

## 2025-01-07 NOTE — PROGRESS NOTES
Daily Note     Today's date: 2025  Patient name: Kasie Kimble  : 1955  MRN: [id number]  Referring provider: Mitchell Grace*  Dx:   Encounter Diagnosis     ICD-10-CM    1. Encounter for other orthopedic aftercare  Z47.89       2. Tear of right supraspinatus tendon  M75.101                      Subjective:   Current Status: her surgeon released her, PT should be prn and to transition into HEP.  New Symptoms/ Problems/ Changes to Established Plan: no new sx's  Response to Last Treatment - good  HEP/ Activity Recommendations- n/a  Progress Towards Goals: wants to return to weight lifting and exercise        Objective: See treatment diary below      Assessment:   Response to manual treatment: good tolerance, pt prefers to end PT session with MHP in supine.  Form with exercises: good form  Response to exercises: progressed below TE with great tolerance, pt is highly motivated to get better and stronger.   Modifications in treatment: n/a  Recommendations - continue HEP      Plan: Continue per plan of care. Continue to progress according to MD protocol.     Precautions: PHASE 3 PER MD protocol below flowsheet  CO-MORBIDITIES:  HEP ACCESS CODE:  [id number]  FOTO Completed On: 1/3 (Score:  63  Score Predication:  66 )      POC Expires Reeval for Medicare to be completed Unit LImit Auth Expiration Date PT/OT/STVisit Limit   25 By visit  24 N/a N/a N/a    Completed on 24                 TREATMENT DIARY  Auth Status DATE 12/27 12/31 1/3 1/7   Approved Visit # 25 26 27 28    Remaining       MANUAL THERAPY       GHJ mobs grade II inf/post  5'     R shoulder PROM/ MFR 15 15' 8' 8'   R upper trap/ rhomboid MFR                            THERAPEUTIC EXERCISE HEP       Pulleys: Scaption  3' 3' 20x 20x   Pulleys: Flexion  3' 3' 20x 20x   Pendulums: CW, CCW  1# 20 2# x15 2# 20 2# 20   Standing Scaption   15 15 20   Standing Flexion   15 15 20   Bicep curls  0# 20x  2# 20 2# 20   Hammer Curls    2#  "20 2# 20   Standing IR stretch with strap  10\" 5  10\" 5 10\" 5            Supine AROM Flex    0# 20 0# 20   S/l AROM ABD    0# 20 0# 20   S/l AROM ER   10 0# 20 0# 20                                                                   NEUROMUSCULAR REEDUCATION         Mod Rows  0# 20 1# x10 1# 20 1#20   Mod Ext  0# 20 1# x10 1# 20 1# 20   Supine punches  0# 20 1# x10  2# 20   TB LPD   OTB x10 OTB 20 GTB 20   TB row   OTB x10 OTB 20 GTB 20   TB IR   OTB x10 OTB 20 GTB 20   TB ER   OTB x10 OTB 20 GTB 20   Wall slides: fwd/scap    10x ea 10x ea                                                   THERAPEUTIC ACTIVITY        5 Cone placing fwd on shelf    2x 2x   5 cone placing side on shelf    1x 1x   Walking around clinic, 90/90    2X 2x           GAIT TRAINING                                        MODALITIES       MHP + TENs post supine 10 min post 10' post 10' POST 10' post                          Media Information                                    "

## 2025-01-10 ENCOUNTER — EVALUATION (OUTPATIENT)
Dept: PHYSICAL THERAPY | Facility: CLINIC | Age: 70
End: 2025-01-10
Payer: MEDICARE

## 2025-01-10 DIAGNOSIS — M75.101 TEAR OF RIGHT SUPRASPINATUS TENDON: ICD-10-CM

## 2025-01-10 DIAGNOSIS — Z47.89 ENCOUNTER FOR OTHER ORTHOPEDIC AFTERCARE: Primary | ICD-10-CM

## 2025-01-10 PROCEDURE — 97110 THERAPEUTIC EXERCISES: CPT | Performed by: PHYSICAL THERAPIST

## 2025-01-10 PROCEDURE — 97140 MANUAL THERAPY 1/> REGIONS: CPT | Performed by: PHYSICAL THERAPIST

## 2025-01-10 NOTE — PROGRESS NOTES
PT Re-Evaluation     Today's date: 1/10/2025  Patient name: Kasie Kimble  : 1955  MRN: [id number]  Referring provider: Mitchell Grace*  Dx:   Encounter Diagnosis     ICD-10-CM    1. Encounter for other orthopedic aftercare  Z47.89       2. Tear of right supraspinatus tendon  M75.101                      Assessment  Impairments: abnormal or restricted ROM, activity intolerance, impaired physical strength and pain with function    Assessment details: Since starting skilled PT, pain levels are decreased, R shoulder ROM is improving, however pt continues to have stiffness in flexion and ER. R shoulder strength is improving nicely with gradual functional progress. Recommend pt continue skilled PT focusing on improving ROM and strength.  Understanding of Dx/Px/POC: good     Prognosis: good    Goals  STG's ( 3-4 weeks)  1. Pt will be independent in HEP-met  2. Pt will have improved R shoulder flex and scaption PROM to 90* in 4 weeks- met  LTG's ( 6- 8 weeks)  1. Improve FOTO score by 8-10 points in 6-8 weeks-  met  2. Pt will have decreased pain to 3/10 at worst- met  3. Pt will have improved R shoulder AROM by 10-15*- partial met  4. Pt will be able to sleep in bed with greater ease- partial met  5. Pt will return to driving independently.- met    Plan  Patient would benefit from: skilled physical therapy  Planned modality interventions: cryotherapy and TENS    Planned therapy interventions: manual therapy, joint mobilization, neuromuscular re-education, strengthening, stretching, therapeutic activities, therapeutic exercise, functional ROM exercises, flexibility and home exercise program    Frequency: 2x week  Duration in weeks: 8  Plan of Care beginning date: 1/10/2025  Plan of Care expiration date: 3/7/2025  Treatment plan discussed with: PTA and patient        Subjective Evaluation    History of Present Illness  Mechanism of injury: I.E; Pt had skilled PT for R shoulder for RTC tendonitis and  was discharged with resolution of symptoms May 2024. Pt started to experience increased R shoulder pain. MRI testing showed a full thickness supraspinatus tear and biceps tendon tear. Pt underwent RTC repair, biceps tenodesis and SAD on 9/25/24.   Pt arrives to skilled PT with her arm in a sling. Pt is limited with all functional activities involving her R UE. Pt is taking Advil and Tylenol every 3 hours. Pt has increased pain and difficulty sleeping and is currently sleeping in a reclining chair.     10/25/24; Pt is compliant in HEP. Pt has no change in functional activities 2* keeping her arm in the sling and following MD protocol. Pt has noticed that her elbow is not as painful when taking her arm out the sling to do her exercises.    11/18/24: Pt is compliant in HEP. Pt has returned to driving activities. Pt needs to bend over to wash her hair or complete dressing activities. Pt needs to put her R arm in first and put the head open over next. Pt had increased pain when sleeping at night last night that radiated down the back of her arm.    12/18/24: Pt is compliant in HEP. Pt is intentional with her reaching. Pt can feel increased pain with reaching activities. Pt notes washing her hair is pretty easy, but is still putting her R arm in first when dressing. Pt no longer has pain radiating down her arm. Pt can still have pain when sleeping at night. Pt was able to drive a longer distance to [place], [place]. Pt does not hesitate to drive.    1/10/25: Pt is putting her R arm in her sleeve normally, without thinking about it. Pt feels pain at night and wakes up with stiffness almost every morning. Pt has released her to return to riding her bike. Pt is able to curl the back of her hair with a curling iron. Pt reports her pain is not constant. Pt feels relief with moist heat.    Work; retired  Hobbies: traveling, bike riding, exercising  Gait; no abnormalities  Patient Goals  Patient goals for therapy: decreased  pain  Patient goal: full ROM; to return to weight training for my shoulders and arm  Pain  At best pain ratin  At worst pain rating: 3  Location: R shoulder  Quality: dull ache    Social Support  Lives with: alone    Employment status: not working  Hand dominance: right      Diagnostic Tests  MRI studies: abnormal  Treatments  Previous treatment: physical therapy (prior to surgery)        Objective     Neurological Testing     Sensation     Shoulder   Left Shoulder   Intact: light touch    Right Shoulder   Intact: Light touch    Reflexes   Left   Biceps (C5/C6): normal (2+)  Brachioradialis (C6): normal (2+)  Triceps (C7): normal (2+)    Additional Neurological Details  R UE reflexes NT    Active Range of Motion     Right Shoulder   Flexion: 105 degrees with pain  Abduction: 60 degrees with pain  External rotation 45°: 68 degrees   Internal rotation 45°: 70 degrees     Additional Active Range of Motion Details  R shoulder AROM: NT  R elbow AROM; NT    Passive Range of Motion     Right Shoulder   Flexion: 160 degrees   Abduction: 172 degrees   External rotation 45°: 70 degrees   Internal rotation 45°: 85 degrees     Right Elbow   Flexion: 130 degrees with pain  Extension: -10 degrees with pain    Additional Passive Range of Motion Details  (+) TTP R anterior shoulder  Increased muscle tone R upper trap/ levator scap  Cervical ROM limited with SB and rotation ROM  R shoulder strength; NT      Strength/Myotome Testing     Right Shoulder     Planes of Motion   Flexion: 4   Abduction: 4   External rotation at 0°: 5   Internal rotation at 0°: 5     Additional Strength Details  Slight shoulder shrug with shoulder flexion and abduction ROM      Precautions: PHASE 3 PER MD protocol below flowsheet  CO-MORBIDITIES:  HEP ACCESS CODE:  [id number]  FOTO Completed On: 1/3 (Score:  63  Score Predication:  66 )      POC Expires Reeval for Medicare to be completed Unit LImit Auth Expiration Date PT/OT/STVisit Limit   3/7/25 By  "visit  39 N/a N/a N/a    Completed on 1/10/25                 TREATMENT DIARY  Auth Status DATE 12/27 12/31 1/3 1/7 1/10   Approved Visit # 25 26 27 28 29    Remaining        MANUAL THERAPY        GHJ mobs grade II inf/post  5'   5'   R shoulder PROM/ MFR 15 15' 8' 8' 10'   R upper trap/ rhomboid MFR        Progress note     8'                   THERAPEUTIC EXERCISE HEP        Pulleys: Scaption  3' 3' 20x 20x 3'   Pulleys: Flexion  3' 3' 20x 20x 3'   Pendulums: CW, CCW  1# 20 2# x15 2# 20 2# 20 2# x20   Standing Scaption   15 15 20    Standing Flexion   15 15 20    Bicep curls  0# 20x  2# 20 2# 20    Hammer Curls    2# 20 2# 20    Standing IR stretch with strap  10\" 5  10\" 5 10\" 5              Supine AROM Flex    0# 20 0# 20    S/l AROM ABD    0# 20 0# 20 X10 w/OP   S/l AROM ER   10 0# 20 0# 20 1# x10                                                                           NEUROMUSCULAR REEDUCATION          Mod Rows  0# 20 1# x10 1# 20 1#20    Mod Ext  0# 20 1# x10 1# 20 1# 20    Supine punches  0# 20 1# x10  2# 20    TB LPD   OTB x10 OTB 20 GTB 20 GTB x20   TB row   OTB x10 OTB 20 GTB 20 GTB x20   TB IR   OTB x10 OTB 20 GTB 20 GTB x20   TB ER   OTB x10 OTB 20 GTB 20 GTB x20   Wall slides: fwd/scap    10x ea 10x ea                                                          THERAPEUTIC ACTIVITY         5 Cone placing fwd on shelf    2x 2x    5 cone placing side on shelf    1x 1x    Walking around clinic, 90/90    2X 2x             GAIT TRAINING                                             MODALITIES        MHP + TENs post supine 10 min post 10' post 10' POST 10' post 10' post                           Media Information                                    "

## 2025-01-20 ENCOUNTER — RA CDI HCC (OUTPATIENT)
Dept: OTHER | Facility: HOSPITAL | Age: 70
End: 2025-01-20

## 2025-01-21 ENCOUNTER — OFFICE VISIT (OUTPATIENT)
Dept: PHYSICAL THERAPY | Facility: CLINIC | Age: 70
End: 2025-01-21
Payer: MEDICARE

## 2025-01-21 DIAGNOSIS — M75.101 TEAR OF RIGHT SUPRASPINATUS TENDON: ICD-10-CM

## 2025-01-21 DIAGNOSIS — Z47.89 ENCOUNTER FOR OTHER ORTHOPEDIC AFTERCARE: Primary | ICD-10-CM

## 2025-01-21 PROCEDURE — 97112 NEUROMUSCULAR REEDUCATION: CPT

## 2025-01-21 PROCEDURE — 97140 MANUAL THERAPY 1/> REGIONS: CPT

## 2025-01-21 PROCEDURE — 97110 THERAPEUTIC EXERCISES: CPT

## 2025-01-21 NOTE — PROGRESS NOTES
Daily Note     Today's date: 2025  Patient name: Kasie Kimble  : 1955  MRN: [id number]  Referring provider: Mitchell Grace*  Dx:   Encounter Diagnosis     ICD-10-CM    1. Encounter for other orthopedic aftercare  Z47.89       2. Tear of right supraspinatus tendon  M75.101                      Subjective:   Current Status: Pt reports feeling very stiff and tight, did not do anything while on vacation.   New Symptoms/ Problems/ Changes to Established Plan: no new sx's  Response to Last Treatment - good  HEP/ Activity Recommendations- n/a  Progress Towards Goals: wants to return to weight lifting and exercise        Objective: See treatment diary below      Assessment:   Response to manual treatment: focused on PROM per pt request concluded session with MHP. Pt was very tight compared to previous session.   Form with exercises: good form  Response to exercises: ROM limitation was noted when performing OH TE task. e  Modifications in treatment: n/a  Recommendations - continue HEP      Plan: Continue per plan of care. Continue to progress according to MD protocol.     Precautions: PHASE 3 PER MD protocol below flowsheet  CO-MORBIDITIES:  HEP ACCESS CODE:  [id number]  FOTO Completed On: 1/3 (Score:  63  Score Predication:  66 )      POC Expires Reeval for Medicare to be completed Unit LImit Auth Expiration Date PT/OT/STVisit Limit   3/7/25 By visit  39 N/a N/a N/a    Completed on 1/10/25                 TREATMENT DIARY  Auth Status DATE 1/3 1/7 1/10 1/21   Approved Visit # 27 28 29 30    Remaining       MANUAL THERAPY       GHJ mobs grade II inf/post   5'    R shoulder PROM/ MFR 8' 8' 10' 15'   R upper trap/ rhomboid MFR       Progress note   8'                  THERAPEUTIC EXERCISE HEP       Pulleys: Scaption  20x 20x 3' 3'   Pulleys: Flexion  20x 20x 3' 3'   Pendulums: CW, CCW  2# 20 2# 20 2# x20 2# 20   Standing Scaption  15 20     Standing Flexion  15 20     Bicep curls  2# 20 2# 20  2# 20  "  Hammer Curls  2# 20 2# 20  2# 20   Standing IR stretch with strap  10\" 5 10\" 5              Supine AROM Flex  0# 20 0# 20     S/l AROM ABD  0# 20 0# 20 X10 w/OP 20x   S/l AROM ER  0# 20 0# 20 1# x10 20x                                                                   NEUROMUSCULAR REEDUCATION         Mod Rows  1# 20 1#20     Mod Ext  1# 20 1# 20     Supine punches   2# 20  2# 20   TB LPD  OTB 20 GTB 20 GTB x20    TB row  OTB 20 GTB 20 GTB x20    TB IR  OTB 20 GTB 20 GTB x20    TB ER  OTB 20 GTB 20 GTB x20    Wall slides: fwd/scap  10x ea 10x ea  20x ea                                                   THERAPEUTIC ACTIVITY        5 Cone placing fwd on shelf  2x 2x  3x   5 cone placing side on shelf  1x 1x  3x   Walking around clinic, 90/90  2X 2x             GAIT TRAINING                                        MODALITIES       MHP + TENs post supine 10' POST 10' post 10' post 10' post                 Media Information              "

## 2025-01-24 ENCOUNTER — OFFICE VISIT (OUTPATIENT)
Dept: PHYSICAL THERAPY | Facility: CLINIC | Age: 70
End: 2025-01-24
Payer: MEDICARE

## 2025-01-24 DIAGNOSIS — M75.101 TEAR OF RIGHT SUPRASPINATUS TENDON: ICD-10-CM

## 2025-01-24 DIAGNOSIS — Z47.89 ENCOUNTER FOR OTHER ORTHOPEDIC AFTERCARE: Primary | ICD-10-CM

## 2025-01-24 PROCEDURE — 97110 THERAPEUTIC EXERCISES: CPT

## 2025-01-24 PROCEDURE — 97140 MANUAL THERAPY 1/> REGIONS: CPT

## 2025-01-24 PROCEDURE — 97112 NEUROMUSCULAR REEDUCATION: CPT

## 2025-01-24 NOTE — PROGRESS NOTES
Daily Note     Today's date: 2025  Patient name: Kasie Kimble  : 1955  MRN: [id number]  Referring provider: Mitchell Grace*  Dx:   Encounter Diagnosis     ICD-10-CM    1. Encounter for other orthopedic aftercare  Z47.89       2. Tear of right supraspinatus tendon  M75.101                      Subjective:   Current Status: Pt reports feeling better since lv.  New Symptoms/ Problems/ Changes to Established Plan: no new sx's  Response to Last Treatment - good  HEP/ Activity Recommendations- n/a  Progress Towards Goals: wants to return to weight lifting and exercise        Objective: See treatment diary below      Assessment:   Response to manual treatment: focused on PROM per pt request concluded session with MHP. Pt shoulder was less tight compared to previous session.   Form with exercises: good form  Response to exercises: ROM limitation was noted when performing OH TE task.   Modifications in treatment: n/a  Recommendations - continue HEP      Plan: Continue per plan of care. Continue to progress according to MD protocol.     Precautions: PHASE 3 PER MD protocol below flowsheet  CO-MORBIDITIES:  HEP ACCESS CODE:  [id number]  FOTO Completed On: 1/3 (Score:  63  Score Predication:  66 )      POC Expires Reeval for Medicare to be completed Unit LImit Auth Expiration Date PT/OT/STVisit Limit   3/7/25 By visit  39 N/a N/a N/a    Completed on 1/10/25                 TREATMENT DIARY  Auth Status DATE 1/3 1/7 1/10 1/21 1/24   Approved Visit # 27 28 29 30 31    Remaining        MANUAL THERAPY        GHJ mobs grade II inf/post   5'     R shoulder PROM/ MFR 8' 8' 10' 15' 10'   R upper trap/ rhomboid MFR        Progress note   8'                     THERAPEUTIC EXERCISE HEP        UBE      3'/3'   Pulleys: Scaption  20x 20x 3' 3' 20x   Pulleys: Flexion  20x 20x 3' 3' 20x   Pendulums: CW, CCW  2# 20 2# 20 2# x20 2# 20 2# 20   Standing Scaption  15 20      Standing Flexion  15 20      Bicep curls  2#  "20 2# 20  2# 20 2# 20   Hammer Curls  2# 20 2# 20  2# 20 2# 20   Standing IR stretch with strap  10\" 5 10\" 5                Supine AROM Flex  0# 20 0# 20      S/l AROM ABD  0# 20 0# 20 X10 w/OP 20x 20x   S/l AROM ER  0# 20 0# 20 1# x10 20x 20x                                                                           NEUROMUSCULAR REEDUCATION          Mod Rows  1# 20 1#20      Mod Ext  1# 20 1# 20      Supine punches   2# 20  2# 20 2# 20   TB LPD  OTB 20 GTB 20 GTB x20  Plum 20   TB row  OTB 20 GTB 20 GTB x20  Plum 20   TB IR  OTB 20 GTB 20 GTB x20  GTB 20   TB ER  OTB 20 GTB 20 GTB x20  GTB 20   Wall slides: fwd/scap  10x ea 10x ea  20x ea                                                          THERAPEUTIC ACTIVITY         5 Cone placing fwd on shelf  2x 2x  3x 3x   5 cone placing side on shelf  1x 1x  3x 3x   Walking around clinic, 90/90  2X 2x               GAIT TRAINING                                             MODALITIES        MHP + TENs post supine 10' POST 10' post 10' post 10' post 10' post                  Media Information              "

## 2025-01-27 ENCOUNTER — APPOINTMENT (OUTPATIENT)
Dept: LAB | Facility: HOSPITAL | Age: 70
End: 2025-01-27
Payer: MEDICARE

## 2025-01-27 ENCOUNTER — OFFICE VISIT (OUTPATIENT)
Dept: FAMILY MEDICINE CLINIC | Facility: HOSPITAL | Age: 70
End: 2025-01-27
Payer: MEDICARE

## 2025-01-27 ENCOUNTER — RESULTS FOLLOW-UP (OUTPATIENT)
Dept: FAMILY MEDICINE CLINIC | Facility: HOSPITAL | Age: 70
End: 2025-01-27

## 2025-01-27 VITALS
HEIGHT: 62 IN | DIASTOLIC BLOOD PRESSURE: 65 MMHG | SYSTOLIC BLOOD PRESSURE: 104 MMHG | BODY MASS INDEX: 24.69 KG/M2 | HEART RATE: 76 BPM | TEMPERATURE: 97.6 F | OXYGEN SATURATION: 98 % | WEIGHT: 134.2 LBS

## 2025-01-27 DIAGNOSIS — Z00.00 MEDICARE ANNUAL WELLNESS VISIT, SUBSEQUENT: Primary | ICD-10-CM

## 2025-01-27 DIAGNOSIS — Z98.890 S/P RIGHT ROTATOR CUFF REPAIR: ICD-10-CM

## 2025-01-27 DIAGNOSIS — M85.852 OSTEOPENIA OF NECK OF LEFT FEMUR: ICD-10-CM

## 2025-01-27 DIAGNOSIS — Z13.220 SCREENING FOR CHOLESTEROL LEVEL: ICD-10-CM

## 2025-01-27 DIAGNOSIS — Z11.59 NEED FOR HEPATITIS C SCREENING TEST: ICD-10-CM

## 2025-01-27 DIAGNOSIS — M75.101 TEAR OF RIGHT SUPRASPINATUS TENDON: ICD-10-CM

## 2025-01-27 LAB
CHOLEST SERPL-MCNC: 190 MG/DL (ref ?–200)
HDLC SERPL-MCNC: 58 MG/DL
LDLC SERPL CALC-MCNC: 98 MG/DL (ref 0–100)
TRIGL SERPL-MCNC: 169 MG/DL (ref ?–150)

## 2025-01-27 PROCEDURE — 36415 COLL VENOUS BLD VENIPUNCTURE: CPT

## 2025-01-27 PROCEDURE — 80061 LIPID PANEL: CPT

## 2025-01-27 PROCEDURE — 86803 HEPATITIS C AB TEST: CPT

## 2025-01-27 PROCEDURE — G0438 PPPS, INITIAL VISIT: HCPCS | Performed by: INTERNAL MEDICINE

## 2025-01-27 RX ORDER — PHENOL 1.4 %
600 AEROSOL, SPRAY (ML) MUCOUS MEMBRANE 2 TIMES DAILY WITH MEALS
COMMUNITY

## 2025-01-27 NOTE — PROGRESS NOTES
Name: Kasie Kimble      : 1955      MRN: [id number]  Encounter Provider: Lorin Conrad DO  Encounter Date: 2025   Encounter department: [facility] PRIMARY CARE [address]     Assessment & Plan  Medicare annual wellness visit, subsequent         Tear of right supraspinatus tendon  S/p rotator cuff repair, in PT and doing well with HEP, con't tx and f/u as per Ortho       S/P right rotator cuff repair  Con't HEP and con't tx and f/u as per PT/Ortho       Screening for cholesterol level    Orders:    Lipid Panel with Direct LDL reflex; Future    Osteopenia of neck of left femur  Dexa ordered, urged to start Ca 600 mg bid and con't Vit D, will follow  Orders:    DXA bone density spine hip and pelvis; Future    Need for hepatitis C screening test    Orders:    Hepatitis C antibody; Future      Depression Screening and Follow-up Plan: Patient was screened for depression during today's encounter. They screened negative with a PHQ-2 score of 0.      Preventive health issues were discussed with patient, and age appropriate screening tests were ordered as noted in patient's After Visit Summary. Personalized health advice and appropriate referrals for health education or preventive services given if needed, as noted in patient's After Visit Summary.    Colonoscopy     Mammo  - has order for     Dexa 3/23 - osteopenia - order for  given    PAP  - Dr. Shen Lemons     BW 10/24  FLP  - order given      History of Present Illness     HPI  Pt here for follow up appt and AWV    Pt was seen by Ortho in 2204 for R shoulder pain.  She did PT w/o great benefit.  She subsequently had a MRI of the shoulder an dwas found to have a full thickness tear of anterior distal supraspinatous tendon. She had a subsequent rotator cuff repair surgery on 24. She did PT after her surgery. She has done well and at her f/u appt with Dr. Grace on 25 she was told to  con't HEP and f/u prn.  She has been doing well with the HEP as it gets stiff if she doesn't.  She notes pain is tolerable and she does not have to take anything for her pain.     Pt had a bout of diverticulitis in the fall. She was tx with abx and has had no issues since then.  She does not smoke and does regular exercise. She notes no recent falls.     She is due for her Dexa in March      Patient Care Team:  Lorin Conrad DO as PCP - General  Shen Lemons MD (Gynecology)  CARLEE Adair MD (General Surgery)  Mau Alvarenga MD (Dermatology)    Review of Systems   Constitutional:  Negative for chills, fever and unexpected weight change.   HENT:  Positive for hearing loss. Negative for congestion and trouble swallowing.    Eyes:  Negative for pain and visual disturbance.   Respiratory:  Negative for cough, shortness of breath and wheezing.    Cardiovascular:  Negative for chest pain, palpitations and leg swelling.   Gastrointestinal:  Negative for abdominal pain, blood in stool, constipation, diarrhea, nausea and vomiting.   Endocrine: Negative for polyuria.   Genitourinary:  Negative for difficulty urinating, dysuria, vaginal bleeding and vaginal pain.   Musculoskeletal:  Negative for back pain and gait problem.   Skin:  Negative for rash and wound.   Neurological:  Negative for dizziness and headaches.   Hematological:  Does not bruise/bleed easily.   Psychiatric/Behavioral:  Negative for confusion and dysphoric mood.      Medical History Reviewed by provider this encounter:  Tobacco  Allergies  Meds  Problems  Med Hx  Surg Hx  Fam Hx       Annual Wellness Visit Questionnaire   Kasie is here for her Subsequent Wellness visit. Last Medicare Wellness visit information reviewed, patient interviewed and updates made to the record today.      Health Risk Assessment:   Patient rates overall health as very good. Patient feels that their physical health rating is same. Patient is very satisfied  with their life. Eyesight was rated as same. Hearing was rated as same. Patient feels that their emotional and mental health rating is same. Patients states they are sometimes angry. Patient states they are sometimes unusually tired/fatigued. Pain experienced in the last 7 days has been none. Patient states that she has experienced no weight loss or gain in last 6 months.     Depression Screening:   PHQ-2 Score: 0      Fall Risk Screening:   In the past year, patient has experienced: no history of falling in past year      Urinary Incontinence Screening:   Patient has not leaked urine accidently in the last six months.     Home Safety:  Patient does not have trouble with stairs inside or outside of their home. Patient has working smoke alarms and has working carbon monoxide detector. Home safety hazards include: none.     Nutrition:   Current diet is Regular.     Medications:   Patient is not currently taking any over-the-counter supplements. Patient is able to manage medications.     Activities of Daily Living (ADLs)/Instrumental Activities of Daily Living (IADLs):   Walk and transfer into and out of bed and chair?: Yes  Dress and groom yourself?: Yes    Bathe or shower yourself?: Yes    Feed yourself? Yes  Do your laundry/housekeeping?: Yes  Manage your money, pay your bills and track your expenses?: Yes  Make your own meals?: Yes    Do your own shopping?: Yes    Previous Hospitalizations:   Any hospitalizations or ED visits within the last 12 months?: No      Advance Care Planning:   Living will: Yes    Durable POA for healthcare: Yes    Advanced directive: Yes      Cognitive Screening:   Provider or family/friend/caregiver concerned regarding cognition?: No    PREVENTIVE SCREENINGS      Cardiovascular Screening:    General: Risks and Benefits Discussed    Due for: Lipid Panel      Diabetes Screening:     General: Screening Current and Risks and Benefits Discussed      Colorectal Cancer Screening:     General:  Screening Current      Breast Cancer Screening:     General: Screening Current and Risks and Benefits Discussed      Cervical Cancer Screening:    General: Risks and Benefits Discussed and Screening Current      Osteoporosis Screening:    General: Risks and Benefits Discussed and Screening Current      Abdominal Aortic Aneurysm (AAA) Screening:        General: Risks and Benefits Discussed and Screening Not Indicated      Lung Cancer Screening:     General: Screening Not Indicated and Risks and Benefits Discussed      Hepatitis C Screening:    General: Risks and Benefits Discussed    Hep C Screening Accepted: Yes      Screening, Brief Intervention, and Referral to Treatment (SBIRT)    Screening    Typical number of drinks in a week: 3    Single Item Drug Screening:  How often have you used an illegal drug (including marijuana) or a prescription medication for non-medical reasons in the past year? never    Single Item Drug Screen Score: 0  Interpretation: Negative screen for possible drug use disorder    Social Drivers of Health     Financial Resource Strain: Low Risk  (1/25/2024)    Overall Financial Resource Strain (CARDIA)     Difficulty of Paying Living Expenses: Not hard at all   Food Insecurity: No Food Insecurity (1/27/2025)    Hunger Vital Sign     Worried About Running Out of Food in the Last Year: Never true     Ran Out of Food in the Last Year: Never true   Transportation Needs: No Transportation Needs (1/27/2025)    PRAPARE - Transportation     Lack of Transportation (Medical): No     Lack of Transportation (Non-Medical): No   Housing Stability: Low Risk  (1/27/2025)    Housing Stability Vital Sign     Unable to Pay for Housing in the Last Year: No     Number of Times Moved in the Last Year: 0     Homeless in the Last Year: No   Utilities: Not At Risk (1/27/2025)    [facility] Utilities     Threatened with loss of utilities: No     Vision Screening    Right eye Left eye Both eyes   Without correction 20/40 20/30  "20/25   With correction          Objective   /65 (BP Location: Left arm, Patient Position: Sitting, Cuff Size: Standard)   Pulse 76   Temp 97.6 °F (36.4 °C) (Tympanic)   Ht 5' 2\" (1.575 m)   Wt 60.9 kg (134 lb 3.2 oz)   SpO2 98%   BMI 24.55 kg/m²     Physical Exam  Vitals and nursing note reviewed.   Constitutional:       General: She is not in acute distress.     Appearance: She is well-developed. She is not ill-appearing.   HENT:      Head: Normocephalic and atraumatic.      Right Ear: Tympanic membrane and external ear normal. There is no impacted cerumen.      Left Ear: Tympanic membrane and external ear normal. There is no impacted cerumen.      Mouth/Throat:      Mouth: Mucous membranes are moist.      Pharynx: Oropharynx is clear. No oropharyngeal exudate.   Eyes:      General:         Right eye: No discharge.         Left eye: No discharge.      Conjunctiva/sclera: Conjunctivae normal.   Neck:      Thyroid: No thyromegaly.      Vascular: No carotid bruit.      Trachea: No tracheal deviation.   Cardiovascular:      Rate and Rhythm: Normal rate and regular rhythm.      Heart sounds: Normal heart sounds. No murmur heard.  Pulmonary:      Effort: Pulmonary effort is normal. No respiratory distress.      Breath sounds: Normal breath sounds. No wheezing, rhonchi or rales.   Abdominal:      General: There is no distension.      Palpations: Abdomen is soft.      Tenderness: There is no abdominal tenderness. There is no guarding or rebound.   Musculoskeletal:         General: No deformity or signs of injury.      Cervical back: Neck supple.   Lymphadenopathy:      Cervical: No cervical adenopathy.   Skin:     General: Skin is warm and dry.      Coloration: Skin is not pale.      Findings: No bruising or rash.   Neurological:      General: No focal deficit present.      Mental Status: She is alert. Mental status is at baseline.      Motor: No abnormal muscle tone.      Gait: Gait normal.   Psychiatric:    "      Mood and Affect: Mood normal.         Behavior: Behavior normal.         Thought Content: Thought content normal.         Judgment: Judgment normal.

## 2025-01-27 NOTE — PATIENT INSTRUCTIONS
Medicare Preventive Visit Patient Instructions  Thank you for completing your Welcome to Medicare Visit or Medicare Annual Wellness Visit today. Your next wellness visit will be due in one year (1/28/2026).  The screening/preventive services that you may require over the next 5-10 years are detailed below. Some tests may not apply to you based off risk factors and/or age. Screening tests ordered at today's visit but not completed yet may show as past due. Also, please note that scanned in results may not display below.  Preventive Screenings:  Service Recommendations Previous Testing/Comments   Colorectal Cancer Screening  * Colonoscopy    * Fecal Occult Blood Test (FOBT)/Fecal Immunochemical Test (FIT)  * Fecal DNA/Cologuard Test  * Flexible Sigmoidoscopy Age: 45-75 years old   Colonoscopy: every 10 years (may be performed more frequently if at higher risk)  OR  FOBT/FIT: every 1 year  OR  Cologuard: every 3 years  OR  Sigmoidoscopy: every 5 years  Screening may be recommended earlier than age 45 if at higher risk for colorectal cancer. Also, an individualized decision between you and your healthcare provider will decide whether screening between the ages of 76-85 would be appropriate. Colonoscopy: 07/10/2023  FOBT/FIT: Not on file  Cologuard: Not on file  Sigmoidoscopy: Not on file    Screening Current     Breast Cancer Screening Age: 40+ years old  Frequency: every 1-2 years  Not required if history of left and right mastectomy Mammogram: 04/04/2024    Screening Current   Cervical Cancer Screening Between the ages of 21-29, pap smear recommended once every 3 years.   Between the ages of 30-65, can perform pap smear with HPV co-testing every 5 years.   Recommendations may differ for women with a history of total hysterectomy, cervical cancer, or abnormal pap smears in past. Pap Smear: 12/02/2024    Screening Not Indicated   Hepatitis C Screening Once for adults born between 1945 and 1965  More frequently in  patients at high risk for Hepatitis C Hep C Antibody: Not on file        Diabetes Screening 1-2 times per year if you're at risk for diabetes or have pre-diabetes Fasting glucose: 92 mg/dL (10/24/2024)  A1C: No results in last 5 years (No results in last 5 years)  Screening Current   Cholesterol Screening Once every 5 years if you don't have a lipid disorder. May order more often based on risk factors. Lipid panel: 11/21/2022    Screening Current     Other Preventive Screenings Covered by Medicare:  Abdominal Aortic Aneurysm (AAA) Screening: covered once if your at risk. You're considered to be at risk if you have a family history of AAA.  Lung Cancer Screening: covers low dose CT scan once per year if you meet all of the following conditions: (1) Age 55-77; (2) No signs or symptoms of lung cancer; (3) Current smoker or have quit smoking within the last 15 years; (4) You have a tobacco smoking history of at least 20 pack years (packs per day multiplied by number of years you smoked); (5) You get a written order from a healthcare provider.  Glaucoma Screening: covered annually if you're considered high risk: (1) You have diabetes OR (2) Family history of glaucoma OR (3)  aged 50 and older OR (4)  American aged 65 and older  Osteoporosis Screening: covered every 2 years if you meet one of the following conditions: (1) You're estrogen deficient and at risk for osteoporosis based off medical history and other findings; (2) Have a vertebral abnormality; (3) On glucocorticoid therapy for more than 3 months; (4) Have primary hyperparathyroidism; (5) On osteoporosis medications and need to assess response to drug therapy.   Last bone density test (DXA Scan): 03/21/2023.  HIV Screening: covered annually if you're between the age of 15-65. Also covered annually if you are younger than 15 and older than 65 with risk factors for HIV infection. For pregnant patients, it is covered up to 3 times per  pregnancy.    Immunizations:  Immunization Recommendations   Influenza Vaccine Annual influenza vaccination during flu season is recommended for all persons aged >= 6 months who do not have contraindications   Pneumococcal Vaccine   * Pneumococcal conjugate vaccine = PCV13 (Prevnar 13), PCV15 (Vaxneuvance), PCV20 (Prevnar 20)  * Pneumococcal polysaccharide vaccine = PPSV23 (Pneumovax) Adults 19-63 yo with certain risk factors or if 65+ yo  If never received any pneumonia vaccine: recommend Prevnar 20 (PCV20)  Give PCV20 if previously received 1 dose of PCV13 or PPSV23   Hepatitis B Vaccine 3 dose series if at intermediate or high risk (ex: diabetes, end stage renal disease, liver disease)   Respiratory syncytial virus (RSV) Vaccine - COVERED BY MEDICARE PART D  * RSVPreF3 (Arexvy) CDC recommends that adults 60 years of age and older may receive a single dose of RSV vaccine using shared clinical decision-making (SCDM)   Tetanus (Td) Vaccine - COST NOT COVERED BY MEDICARE PART B Following completion of primary series, a booster dose should be given every 10 years to maintain immunity against tetanus. Td may also be given as tetanus wound prophylaxis.   Tdap Vaccine - COST NOT COVERED BY MEDICARE PART B Recommended at least once for all adults. For pregnant patients, recommended with each pregnancy.   Shingles Vaccine (Shingrix) - COST NOT COVERED BY MEDICARE PART B  2 shot series recommended in those 19 years and older who have or will have weakened immune systems or those 50 years and older     Health Maintenance Due:      Topic Date Due   • Hepatitis C Screening  Never done   • Breast Cancer Screening: Mammogram  04/04/2025   • Colorectal Cancer Screening  07/10/2028     Immunizations Due:      Topic Date Due   • Influenza Vaccine (1) 09/01/2024   • COVID-19 Vaccine (6 - 2024-25 season) 09/01/2024     Advance Directives   What are advance directives?  Advance directives are legal documents that state your wishes and  plans for medical care. These plans are made ahead of time in case you lose your ability to make decisions for yourself. Advance directives can apply to any medical decision, such as the treatments you want, and if you want to donate organs.   What are the types of advance directives?  There are many types of advance directives, and each state has rules about how to use them. You may choose a combination of any of the following:  Living will:  This is a written record of the treatment you want. You can also choose which treatments you do not want, which to limit, and which to stop at a certain time. This includes surgery, medicine, IV fluid, and tube feedings.   Durable power of  for healthcare (DPAHC):  This is a written record that states who you want to make healthcare choices for you when you are unable to make them for yourself. This person, called a proxy, is usually a family member or a friend. You may choose more than 1 proxy.  Do not resuscitate (DNR) order:  A DNR order is used in case your heart stops beating or you stop breathing. It is a request not to have certain forms of treatment, such as CPR. A DNR order may be included in other types of advance directives.  Medical directive:  This covers the care that you want if you are in a coma, near death, or unable to make decisions for yourself. You can list the treatments you want for each condition. Treatment may include pain medicine, surgery, blood transfusions, dialysis, IV or tube feedings, and a ventilator (breathing machine).  Values history:  This document has questions about your views, beliefs, and how you feel and think about life. This information can help others choose the care that you would choose.  Why are advance directives important?  An advance directive helps you control your care. Although spoken wishes may be used, it is better to have your wishes written down. Spoken wishes can be misunderstood, or not followed. Treatments  may be given even if you do not want them. An advance directive may make it easier for your family to make difficult choices about your care.   Urinary Incontinence   Urinary incontinence (UI)  is when you lose control of your bladder. UI develops because your bladder cannot store or empty urine properly. The 3 most common types of UI are stress incontinence, urge incontinence, or both.  Medicines:   May be given to help strengthen your bladder control. Report any side effects of medication to your healthcare provider.  Do pelvic muscle exercises often:  Your pelvic muscles help you stop urinating. Squeeze these muscles tight for 5 seconds, then relax for 5 seconds. Gradually work up to squeezing for 10 seconds. Do 3 sets of 15 repetitions a day, or as directed. This will help strengthen your pelvic muscles and improve bladder control.  Train your bladder:  Go to the bathroom at set times, such as every 2 hours, even if you do not feel the urge to go. You can also try to hold your urine when you feel the urge to go. For example, hold your urine for 5 minutes when you feel the urge to go. As that becomes easier, hold your urine for 10 minutes.   Self-care:   Keep a UI record.  Write down how often you leak urine and how much you leak. Make a note of what you were doing when you leaked urine.  Drink liquids as directed. You may need to limit the amount of liquid you drink to help control your urine leakage. Do not drink any liquid right before you go to bed. Limit or do not have drinks that contain caffeine or alcohol.   Prevent constipation.  Eat a variety of high-fiber foods. Good examples are high-fiber cereals, beans, vegetables, and whole-grain breads. Walking is the best way to trigger your intestines to have a bowel movement.  Exercise regularly and maintain a healthy weight.  Weight loss and exercise will decrease pressure on your bladder and help you control your leakage.   Use a catheter as directed  to help  empty your bladder. A catheter is a tiny, plastic tube that is put into your bladder to drain your urine.   Go to behavior therapy as directed.  Behavior therapy may be used to help you learn to control your urge to urinate.     © Copyright [employer] 2018 Information is for End User's use only and may not be sold, redistributed or otherwise used for commercial purposes. All illustrations and images included in CareNotes® are the copyrighted property of [employer].D.A[employer]M., Inc. or [employer]

## 2025-01-28 ENCOUNTER — OFFICE VISIT (OUTPATIENT)
Dept: PHYSICAL THERAPY | Facility: CLINIC | Age: 70
End: 2025-01-28
Payer: MEDICARE

## 2025-01-28 DIAGNOSIS — Z47.89 ENCOUNTER FOR OTHER ORTHOPEDIC AFTERCARE: Primary | ICD-10-CM

## 2025-01-28 DIAGNOSIS — M75.101 TEAR OF RIGHT SUPRASPINATUS TENDON: ICD-10-CM

## 2025-01-28 LAB — HCV AB SER QL: NORMAL

## 2025-01-28 PROCEDURE — 97110 THERAPEUTIC EXERCISES: CPT

## 2025-01-28 PROCEDURE — 97112 NEUROMUSCULAR REEDUCATION: CPT

## 2025-01-28 PROCEDURE — 97140 MANUAL THERAPY 1/> REGIONS: CPT

## 2025-01-28 NOTE — PROGRESS NOTES
Daily Note     Today's date: 2025  Patient name: Kasie Kimble  : 1955  MRN: [id number]  Referring provider: Mitchell Grace*  Dx:   Encounter Diagnosis     ICD-10-CM    1. Encounter for other orthopedic aftercare  Z47.89       2. Tear of right supraspinatus tendon  M75.101                      Subjective:   Current Status: Pt reports feeling better since lv.  New Symptoms/ Problems/ Changes to Established Plan: no new sx's  Response to Last Treatment - good  HEP/ Activity Recommendations- n/a  Progress Towards Goals: wants to return to weight lifting and exercise        Objective: See treatment diary below      Assessment:   Response to manual treatment: Pt shoulder was less tight compared to previous session.   Form with exercises: good form  Response to exercises: ROM limitation was noted when performing OH TE task.   Modifications in treatment: n/a  Recommendations - continue HEP      Plan: Continue per plan of care. Continue to progress according to MD protocol.     Precautions: PHASE 3 PER MD protocol below flowsheet  CO-MORBIDITIES:  HEP ACCESS CODE:  [id number]  FOTO Completed On: 1/3 (Score:  63  Score Predication:  66 )      POC Expires Reeval for Medicare to be completed Unit LImit Auth Expiration Date PT/OT/STVisit Limit   3/7/25 By visit  39 N/a N/a N/a    Completed on 1/10/25                 TREATMENT DIARY  Auth Status DATE 1/10 1/21 1/24 1/28   Approved Visit # 29 30 31 32    Remaining       MANUAL THERAPY       GHJ mobs grade II inf/post 5'      R shoulder PROM/ MFR 10' 15' 10' 10'   R upper trap/ rhomboid MFR       Progress note 8'                    THERAPEUTIC EXERCISE HEP       UBE    3'/3' 3'/3'   Pulleys: Scaption  3' 3' 20x 20x   Pulleys: Flexion  3' 3' 20x 20x   Pendulums: CW, CCW  2# x20 2# 20 2# 20    Standing Scaption     0# 20   Standing Flexion     0# 20   Bicep curls   2# 20 2# 20 3# 20   Hammer Curls   2# 20 2# 20 3# 20   Standing IR stretch with strap                 Supine AROM Flex        S/l AROM ABD  X10 w/OP 20x 20x 20x   S/l AROM ER  1# x10 20x 20x 20x   Wall push ups     20x                                                           NEUROMUSCULAR REEDUCATION         Mod Rows        Mod Ext        Supine punches   2# 20 2# 20 3# 20   TB LPD  GTB x20  Buna 20 Plum 20   TB row  GTB x20  Plum 20 Plum 20   TB IR  GTB x20  GTB 20 GTB 20   TB ER  GTB x20  GTB 20 GTB 20   Wall slides: fwd/scap   20x ea                                                     THERAPEUTIC ACTIVITY        5 Cone placing fwd on shelf   3x 3x 3x   5 cone placing side on shelf   3x 3x 3x   Walking around clinic, 90/90                GAIT TRAINING                                        MODALITIES       MHP + TENs post supine 10' post 10' post 10' post 10' post                 Media Information

## 2025-01-31 ENCOUNTER — OFFICE VISIT (OUTPATIENT)
Dept: PHYSICAL THERAPY | Facility: CLINIC | Age: 70
End: 2025-01-31
Payer: MEDICARE

## 2025-01-31 DIAGNOSIS — M75.101 TEAR OF RIGHT SUPRASPINATUS TENDON: ICD-10-CM

## 2025-01-31 DIAGNOSIS — Z47.89 ENCOUNTER FOR OTHER ORTHOPEDIC AFTERCARE: Primary | ICD-10-CM

## 2025-01-31 PROCEDURE — 97112 NEUROMUSCULAR REEDUCATION: CPT

## 2025-01-31 PROCEDURE — 97110 THERAPEUTIC EXERCISES: CPT

## 2025-01-31 PROCEDURE — 97140 MANUAL THERAPY 1/> REGIONS: CPT

## 2025-01-31 NOTE — PROGRESS NOTES
"Daily Note     Today's date: 2025  Patient name: Kasie Kimble  : 1955  MRN: [id number]  Referring provider: Mitchell Grace*  Dx:   Encounter Diagnosis     ICD-10-CM    1. Encounter for other orthopedic aftercare  Z47.89       2. Tear of right supraspinatus tendon  M75.101                      Subjective:   Current Status: Pt reports feeling better since lv, how I can't get my hand over my head with ease.  New Symptoms/ Problems/ Changes to Established Plan: no new sx's  Response to Last Treatment - good  HEP/ Activity Recommendations- n/a  Progress Towards Goals: wants to return to weight lifting and exercise        Objective: See treatment diary below      Assessment:   Response to manual treatment: Pt shoulder was less tight compared to previous session.   Form with exercises: good form with new TE, to aid in pt goal \"reach over my head\"  Response to exercises: ROM limitation was less when performing OH TE task.   Modifications in treatment: n/a  Recommendations - continue HEP      Plan: Continue per plan of care. Continue to progress according to MD protocol.     Precautions: PHASE 3 PER MD protocol below flowsheet  CO-MORBIDITIES:  HEP ACCESS CODE:  [id number]  FOTO Completed On: 1/3 (Score:  63  Score Predication:  66 )      POC Expires Reeval for Medicare to be completed Unit LImit Auth Expiration Date PT/OT/STVisit Limit   3/7/25 By visit  39 N/a N/a N/a    Completed on 1/10/25                 TREATMENT DIARY  Auth Status DATE 1/10 1/21 1/24 1/28 1/31   Approved Visit # 29 30 31 32 33    Remaining        MANUAL THERAPY        GHJ mobs grade II inf/post 5'       R shoulder PROM/ MFR 10' 15' 10' 10' 10'   R upper trap/ rhomboid MFR        Progress note 8'                       THERAPEUTIC EXERCISE HEP        UBE    3'/3' 3'/3' 3'/3'   Pulleys: Scaption  3' 3' 20x 20x 20x   Pulleys: Flexion  3' 3' 20x 20x 20x   Standing Scaption     0# 20 np   Standing Flexion     0# 20 np   Bicep " "curls   2# 20 2# 20 3# 20 5# 20   Hammer Curls   2# 20 2# 20 3# 20 5# 20   Standing IR stretch with strap                  Supine AROM Flex         S/l AROM ABD  X10 w/OP 20x 20x 20x 20x   S/l AROM ER  1# x10 20x 20x 20x 20x   Wall push ups     20x 20x                                                                  NEUROMUSCULAR REEDUCATION          Supine punches   2# 20 2# 20 3# 20 3# 20   TB LPD  GTB x20  [place] 20 Plum 20 Plum 20   TB row  GTB x20  Plum 20 Plum 20 Plum 20   TB IR  GTB x20  GTB 20 GTB 20 np   TB ER  GTB x20  GTB 20 GTB 20 BTB 20   Wall slides: fwd/scap   20x ea   np                                                         THERAPEUTIC ACTIVITY         5 [place] placing fwd on shelf   3x 3x 3x 3x   5 cone placing side on shelf   3x 3x 3x 3x   [place]      20\" 3   High target taps on white white      2x5   GAIT TRAINING                                             MODALITIES        MHP + TENs post supine 10' post 10' post 10' post 10' post 10' post                  Media Information              "

## 2025-02-04 ENCOUNTER — OFFICE VISIT (OUTPATIENT)
Dept: PHYSICAL THERAPY | Facility: CLINIC | Age: 70
End: 2025-02-04
Payer: MEDICARE

## 2025-02-04 DIAGNOSIS — Z47.89 ENCOUNTER FOR OTHER ORTHOPEDIC AFTERCARE: Primary | ICD-10-CM

## 2025-02-04 DIAGNOSIS — M75.101 TEAR OF RIGHT SUPRASPINATUS TENDON: ICD-10-CM

## 2025-02-04 PROCEDURE — 97140 MANUAL THERAPY 1/> REGIONS: CPT

## 2025-02-04 PROCEDURE — 97112 NEUROMUSCULAR REEDUCATION: CPT

## 2025-02-04 PROCEDURE — 97110 THERAPEUTIC EXERCISES: CPT

## 2025-02-04 NOTE — PROGRESS NOTES
Daily Note     Today's date: 2025  Patient name: Kasie Kimble  : 1955  MRN: [id number]  Referring provider: Mitchell Grace*  Dx:   Encounter Diagnosis     ICD-10-CM    1. Encounter for other orthopedic aftercare  Z47.89       2. Tear of right supraspinatus tendon  M75.101                      Subjective:   Current Status: Pt reports feeling like she over did it at yoga  New Symptoms/ Problems/ Changes to Established Plan: in a lot of discomfort an dhsoulder is very sore.   Response to Last Treatment - good  HEP/ Activity Recommendations- n/a  Progress Towards Goals: wants to return to weight lifting and exercise        Objective: See treatment diary below      Assessment:   Response to manual treatment: due to prior sx's before PT session, focus on pain management during session.   Form with exercises: good form with gentle TE.   Response to exercises: increased ROM and comfort modifying below TE.   Modifications in treatment: n/a  Recommendations - continue HEP      Plan: Continue per plan of care. Continue to progress according to MD protocol.     Precautions: PHASE 3 PER MD protocol below flowsheet  CO-MORBIDITIES:  HEP ACCESS CODE:  [id number]  FOTO Completed On: 1/3 (Score:  63  Score Predication:  66 )      POC Expires Reeval for Medicare to be completed Unit LImit Auth Expiration Date PT/OT/STVisit Limit   3/7/25 By visit  39 N/a N/a N/a    Completed on 1/10/25                 TREATMENT DIARY  Auth Status DATE    Approved Visit # 32 33 34    Remaining      MANUAL THERAPY      GHJ mobs grade II inf/post      R shoulder PROM/ MFR 10' 10' 15'   R upper trap/ rhomboid MFR      Progress note                  THERAPEUTIC EXERCISE HEP      UBE  3'/3' 3'/3' 3'/3'   Pulleys: Scaption  20x 20x 20x   Pulleys: Flexion  20x 20x 20x   Standing Scaption  0# 20 np    Standing Flexion  0# 20 np    Bicep curls  3# 20 5# 20    Hammer Curls  3# 20 5# 20    Standing IR stretch with strap     "10\" 5          Supine AROM Flex    AAROM 20x   S/l AROM ABD  20x 20x    S/l AROM ER  20x 20x    Wall push ups  20x 20x                                                     NEUROMUSCULAR REEDUCATION        Supine punches  3# 20 3# 20 0# 20x   TB LPD  Plum 20 [place] 20 GTB 20   TB row  Plum 20 Plum 20 GTB  20   TB IR  GTB 20 np GTB 20   TB ER  GTB 20 BTB 20 GTB 20   Wall slides: fwd/scap   np                                              THERAPEUTIC ACTIVITY       5 [place] placing fwd on shelf  3x 3x    5 cone placing side on shelf  3x 3x    [place]   20\" 3    High target taps on white white   2x5    GAIT TRAINING                                   MODALITIES      MHP + TENs post supine 10' post 10' post 10' post                Media Information              "

## 2025-02-05 ENCOUNTER — APPOINTMENT (OUTPATIENT)
Dept: PHYSICAL THERAPY | Facility: CLINIC | Age: 70
End: 2025-02-05
Payer: MEDICARE

## 2025-02-07 ENCOUNTER — OFFICE VISIT (OUTPATIENT)
Dept: PHYSICAL THERAPY | Facility: CLINIC | Age: 70
End: 2025-02-07
Payer: MEDICARE

## 2025-02-07 DIAGNOSIS — Z47.89 ENCOUNTER FOR OTHER ORTHOPEDIC AFTERCARE: Primary | ICD-10-CM

## 2025-02-07 DIAGNOSIS — M75.101 TEAR OF RIGHT SUPRASPINATUS TENDON: ICD-10-CM

## 2025-02-07 PROCEDURE — 97140 MANUAL THERAPY 1/> REGIONS: CPT

## 2025-02-07 PROCEDURE — 97110 THERAPEUTIC EXERCISES: CPT

## 2025-02-07 NOTE — PROGRESS NOTES
"Daily Note     Today's date: 2025  Patient name: Kasie Kimble  : 1955  MRN: [id number]  Referring provider: Mitchell Grace*  Dx:   Encounter Diagnosis     ICD-10-CM    1. Encounter for other orthopedic aftercare  Z47.89       2. Tear of right supraspinatus tendon  M75.101                      Subjective:   Current Status: Pt reports feeling a bit better since lv  New Symptoms/ Problems/ Changes to Established Plan: no new sx's  Response to Last Treatment - good  HEP/ Activity Recommendations- n/a  Progress Towards Goals: wants to return to weight lifting and exercise        Objective: See treatment diary below      Assessment:   Response to manual treatment: introduced IASTM to manuals with good tolerance.   Form with exercises: good form with gentle TE.   Response to exercises: increased ROM and strength.   Modifications in treatment: n/a  Recommendations - continue HEP      Plan: Continue per plan of care. Continue to progress according to MD protocol.     Precautions: PHASE 3 PER MD protocol below flowsheet  CO-MORBIDITIES:  HEP ACCESS CODE:  [id number]  FOTO Completed On: 1/3 (Score:  63  Score Predication:  66 )      POC Expires Reeval for Medicare to be completed Unit LImit Auth Expiration Date PT/OT/STVisit Limit   3/7/25 By visit  39 N/a N/a N/a    Completed on 1/10/25                 TREATMENT DIARY  Auth Status DATE    Approved Visit # 32 33 34 35    Remaining       MANUAL THERAPY       GHJ mobs grade II inf/post       R shoulder PROM/ MFR 10' 10' 15' 10' +IASTM   R upper trap/ rhomboid MFR       Progress note                     THERAPEUTIC EXERCISE HEP       UBE  3'/3' 3'/3' 3'/3' 3'/3'   Pulleys: Scaption  20x 20x 20x    Pulleys: Flexion  20x 20x 20x    Standing Scaption  0# 20 np     Standing Flexion  0# 20 np     Bicep curls  3# 20 4# 20  4# 20   Hammer Curls  3# 20 4# 20  4# 20   Standing IR stretch with strap    10\" 5            Supine AROM Flex    AAROM " "20x AROM 20x   S/l AROM ABD  20x 20x  20x   S/l AROM ER  20x 20x  20x   Wall push ups  20x 20x  20x                                                           NEUROMUSCULAR REEDUCATION         Supine punches  3# 20 3# 20 0# 20x 0# 20   TB LPD  Pl[place] 20 Plum 20 GTB 20 Plum 20   TB row  Plum 20 Plum 20 GTB  20 Plum 20   TB IR  GTB 20 np GTB 20 Plum 20   TB ER  GTB 20 BTB 20 GTB 20 Plum 20   Wall slides: fwd/scap   np  20x   Post scapula stretch     20\" 3                                           THERAPEUTIC ACTIVITY        5 [place] placing fwd on shelf  3x 3x  3x   5 cone placing side on shelf  3x 3x  3x   [place]   20\" 3  Red  20\" 3   High target taps on white white   2x5  2x5   GAIT TRAINING                                        MODALITIES       MHP + TENs post supine 10' post 10' post 10' post 10' post                 Media Information              "

## 2025-02-12 ENCOUNTER — EVALUATION (OUTPATIENT)
Dept: PHYSICAL THERAPY | Facility: CLINIC | Age: 70
End: 2025-02-12
Payer: MEDICARE

## 2025-02-12 DIAGNOSIS — Z47.89 ENCOUNTER FOR OTHER ORTHOPEDIC AFTERCARE: Primary | ICD-10-CM

## 2025-02-12 DIAGNOSIS — M75.101 TEAR OF RIGHT SUPRASPINATUS TENDON: ICD-10-CM

## 2025-02-12 PROCEDURE — 97140 MANUAL THERAPY 1/> REGIONS: CPT | Performed by: PHYSICAL THERAPIST

## 2025-02-12 PROCEDURE — 97110 THERAPEUTIC EXERCISES: CPT | Performed by: PHYSICAL THERAPIST

## 2025-02-12 PROCEDURE — 97112 NEUROMUSCULAR REEDUCATION: CPT | Performed by: PHYSICAL THERAPIST

## 2025-02-12 NOTE — PROGRESS NOTES
Daily Note     Today's date: 2025  Patient name: Kasie Kimble  : 1955  MRN: [id number]  Referring provider: Mitchell Grace*  Dx:   Encounter Diagnosis     ICD-10-CM    1. Encounter for other orthopedic aftercare  Z47.89       2. Tear of right supraspinatus tendon  M75.101                      Subjective: Pt reports she is not able to get her arm overhead straight, but it is slowly improving. Pt has been going yoga activities with modification. Pt has been cleared to ride her bike. Pt plans on going to [employer]. Pt is compliant in HEP. Pt feels good relief with MH post tx and relieves pain for most of the day. Pt continues to have soreness and discomfort after sleeping with pain rated 2-3/10, that relieves with movement throughout the day.      Objective: See treatment diary below      Assessment: Tolerated treatment well. Patient exhibited good technique with therapeutic exercises. Pt continues to have tightness at end ROM flexion, ER and IR. ROM improved with GHJ mobs and inferior capsule GISTM tolerated well. Pt has pain rated 5-6/10 with manual stretching at end ROM. Pt is progressing with there ex tband ex well.      Plan: Continue per plan of care. RA next visit.     Precautions: PHASE 3 PER MD protocol below flowsheet  CO-MORBIDITIES:  HEP ACCESS CODE:  [id number]  FOTO Completed On: 1/3 (Score:  63  Score Predication:  66 )      POC Expires Reeval for Medicare to be completed Unit LImit Auth Expiration Date PT/OT/STVisit Limit   3/7/25 By visit  39 N/a N/a N/a    Completed on 1/10/25                 TREATMENT DIARY  Auth Status DATE    Approved Visit # 32 33 34 35 36    Remaining        MANUAL THERAPY        GHJ mobs grade II inf/post     5'   R shoulder PROM/ MFR 10' 10' 15' 10' +IASTM 15' + GISTM   R upper trap/ rhomboid MFR        Progress note                        THERAPEUTIC EXERCISE HEP        UBE  3'/3' 3'/3' 3'/3' 3'/3' 3'/3'   Pulleys:  "Scaption  20x 20x 20x     Pulleys: Flexion  20x 20x 20x     Standing Scaption  0# 20 np      Standing Flexion  0# 20 np      Bicep curls  3# 20 4# 20  4# 20 4# x20   Hammer Curls  3# 20 4# 20  4# 20 4# x20   Standing IR stretch with strap    10\" 5              Supine AROM Flex    AAROM 20x AROM 20x AAROM 4# 10x5\"   S/l AROM ABD  20x 20x  20x 10 1#   S/l AROM ER  20x 20x  20x 10 1#   Wall push ups  20x 20x  20x                                                                   NEUROMUSCULAR REEDUCATION          Supine punches  3# 20 3# 20 0# 20x 0# 20    TB LPD  Plum 20 [place]um 20 GTB 20 Plum 20 Plum x20   TB row  Plum 20 [place] 20 GTB  20 Plum 20 Plum x20   TB IR  GTB 20 np GTB 20 [place]um 20 Plum x20   TB ER  GTB 20 BTB 20 GTB 20 Plum 20 Plum x20   Wall slides: fwd/scap   np  20x 10 ea   Post scapula stretch     20\" 3 20\"x3                                                THERAPEUTIC ACTIVITY         5 [place] placing fwd on shelf  3x 3x  3x    5 cone placing side on shelf  3x 3x  3x    [place]   20\" 3  Red  20\" 3    High target taps on white white   2x5  2x5    GAIT TRAINING                                             MODALITIES        MHP + TENs post supine 10' post 10' post 10' post 10' post 10' post                  Media Information                   "

## 2025-02-14 ENCOUNTER — EVALUATION (OUTPATIENT)
Dept: PHYSICAL THERAPY | Facility: CLINIC | Age: 70
End: 2025-02-14
Payer: MEDICARE

## 2025-02-14 DIAGNOSIS — Z47.89 ENCOUNTER FOR OTHER ORTHOPEDIC AFTERCARE: Primary | ICD-10-CM

## 2025-02-14 DIAGNOSIS — M75.101 TEAR OF RIGHT SUPRASPINATUS TENDON: ICD-10-CM

## 2025-02-14 PROCEDURE — 97110 THERAPEUTIC EXERCISES: CPT | Performed by: PHYSICAL THERAPIST

## 2025-02-14 PROCEDURE — 97140 MANUAL THERAPY 1/> REGIONS: CPT | Performed by: PHYSICAL THERAPIST

## 2025-02-14 NOTE — PROGRESS NOTES
PT Re-Evaluation     Today's date: 2025  Patient name: Kasie Kimble  : 1955  MRN: [id number]  Referring provider: Mitchell Grace*  Dx:   Encounter Diagnosis     ICD-10-CM    1. Encounter for other orthopedic aftercare  Z47.89       2. Tear of right supraspinatus tendon  M75.101                      Assessment  Impairments: abnormal or restricted ROM, activity intolerance, impaired physical strength and pain with function    Assessment details: Since starting skilled PT, pain levels are decreased, R shoulder ROM is improving, however pt continues to have stiffness in flexion and ER. R shoulder strength is improving nicely with gradual functional progress. Recommend pt continue skilled PT focusing on improving ROM and strength.  Understanding of Dx/Px/POC: good     Prognosis: good    Goals  STG's ( 3-4 weeks)  1. Pt will be independent in HEP-met  2. Pt will have improved R shoulder flex and scaption PROM to 90* in 4 weeks- met  LTG's ( 6- 8 weeks)  1. Improve FOTO score by 8-10 points in 6-8 weeks-  met  2. Pt will have decreased pain to 3/10 at worst- met  3. Pt will have improved R shoulder AROM by 10-15*- met  4. Pt will be able to sleep in bed with greater ease- partial met  5. Pt will return to driving independently.- met    Plan  Patient would benefit from: skilled physical therapy  Planned modality interventions: cryotherapy and TENS    Planned therapy interventions: manual therapy, joint mobilization, neuromuscular re-education, strengthening, stretching, therapeutic activities, therapeutic exercise, functional ROM exercises, flexibility and home exercise program    Frequency: 2x week  Duration in weeks: 8  Plan of Care beginning date: 2025  Plan of Care expiration date: 2025  Treatment plan discussed with: PTA and patient        Subjective Evaluation    History of Present Illness  Mechanism of injury: I.E; Pt had skilled PT for R shoulder for RTC tendonitis and was  discharged with resolution of symptoms May 2024. Pt started to experience increased R shoulder pain. MRI testing showed a full thickness supraspinatus tear and biceps tendon tear. Pt underwent RTC repair, biceps tenodesis and SAD on 9/25/24.   Pt arrives to skilled PT with her arm in a sling. Pt is limited with all functional activities involving her R UE. Pt is taking Advil and Tylenol every 3 hours. Pt has increased pain and difficulty sleeping and is currently sleeping in a reclining chair.     10/25/24; Pt is compliant in HEP. Pt has no change in functional activities 2* keeping her arm in the sling and following MD protocol. Pt has noticed that her elbow is not as painful when taking her arm out the sling to do her exercises.    11/18/24: Pt is compliant in HEP. Pt has returned to driving activities. Pt needs to bend over to wash her hair or complete dressing activities. Pt needs to put her R arm in first and put the head open over next. Pt had increased pain when sleeping at night last night that radiated down the back of her arm.    12/18/24: Pt is compliant in HEP. Pt is intentional with her reaching. Pt can feel increased pain with reaching activities. Pt notes washing her hair is pretty easy, but is still putting her R arm in first when dressing. Pt no longer has pain radiating down her arm. Pt can still have pain when sleeping at night. Pt was able to drive a longer distance to [place], [place]. Pt does not hesitate to drive.    1/10/25: Pt is putting her R arm in her sleeve normally, without thinking about it. Pt feels pain at night and wakes up with stiffness almost every morning. Pt has released her to return to riding her bike. Pt is able to curl the back of her hair with a curling iron. Pt reports her pain is not constant. Pt feels relief with moist heat.    2/14/25:PT is compliant in HEP. Pt continues to struggle with overhead reaching at end ROM. Pt has returned to normal household cleaning and cooking  activities. Pt is able to lift a grocery bag and laundry basket. Pt continues to wake up with soreness in the morning and feels relief with moist heat.    Work; retired  Hobbies: traveling, bike riding, exercising  Gait; no abnormalities  Patient Goals  Patient goals for therapy: decreased pain  Patient goal: full ROM; to return to weight training for my shoulders and arm  Pain  At best pain ratin  At worst pain ratin  Location: R shoulder  Quality: dull ache    Social Support  Lives with: alone    Employment status: not working  Hand dominance: right      Diagnostic Tests  MRI studies: abnormal  Treatments  Previous treatment: physical therapy (prior to surgery)        Objective     Neurological Testing     Sensation     Shoulder   Left Shoulder   Intact: light touch    Right Shoulder   Intact: Light touch    Reflexes   Left   Biceps (C5/C6): normal (2+)  Brachioradialis (C6): normal (2+)  Triceps (C7): normal (2+)    Additional Neurological Details  R UE reflexes NT    Active Range of Motion   Left Shoulder   Flexion: 170 degrees     Right Shoulder   Flexion: 132 degrees with pain  Abduction: 158 degrees with pain  External rotation 45°: 68 degrees   Internal rotation 45°: 78 degrees     Additional Active Range of Motion Details  I.E: R shoulder AROM: NT  R elbow AROM; NT    Passive Range of Motion     Right Shoulder   Flexion: 170 degrees   Abduction: 178 degrees   External rotation 45°: 70 degrees   Internal rotation 45°: 85 degrees     Right Elbow   Flexion: 130 degrees with pain  Extension: -10 degrees with pain    Additional Passive Range of Motion Details  (+) TTP R anterior shoulder  Increased muscle tone R upper trap/ levator scap  Cervical ROM limited with SB and rotation ROM  R shoulder strength; NT      Strength/Myotome Testing     Right Shoulder     Planes of Motion   Flexion: 4+   Abduction: 4+   External rotation at 0°: 5   Internal rotation at 0°: 5     Additional Strength Details  Slight  "shoulder shrug with shoulder flexion and abduction ROM      Precautions: PHASE 3 PER MD protocol below flowsheet  CO-MORBIDITIES:  HEP ACCESS CODE:  [id number]  FOTO Completed On: 1/3 (Score:  63  Score Predication:  66 )      POC Expires Reeval for Medicare to be completed Unit LImit Auth Expiration Date PT/OT/STVisit Limit   4/11/25 By visit  47 N/a N/a N/a    Completed on 2/14/25                 TREATMENT DIARY  Auth Status DATE 1/28 1/31 2/4 2/7 2/12 2/14   Approved Visit # 32 33 34 [phone] 37    Remaining         MANUAL THERAPY         GHJ mobs grade II inf/post     5' 5'   R shoulder PROM/ MFR 10' 10' 15' 10' +IASTM 15' + GISTM 15'   R upper trap/ rhomboid MFR         Progress note      5'                     THERAPEUTIC EXERCISE HEP         UBE  3'/3' 3'/3' 3'/3' 3'/3' 3'/3' 3'/3'   Pulleys: Scaption  20x 20x 20x      Pulleys: Flexion  20x 20x 20x      Standing Scaption  0# 20 np       Standing Flexion  0# 20 np       Bicep curls  3# 20 4# 20  4# 20 4# x20    Hammer Curls  3# 20 4# 20  4# 20 4# x20    Standing IR stretch with strap    10\" 5   10\"x3             Supine AROM Flex    AAROM 20x AROM 20x AAROM 4# 10x5\" AAROM 3# 10x5\"   S/l AROM ABD  20x 20x  20x 10 1#    S/l AROM ER  20x 20x  20x 10 1#    Wall push ups  20x 20x  20x                                                                           NEUROMUSCULAR REEDUCATION           Supine punches  3# 20 3# 20 0# 20x 0# 20     TB LPD  [place] 20 Plum 20 GTB 20 Plum 20 Plum x20 Plum x20   TB row  Plum 20 Plum 20 GTB  20 Plum 20 Plum x20 Plum x20   TB IR  GTB 20 np GTB 20 Plum 20 Plum x20 Plum x20   TB ER  GTB 20 BTB 20 GTB 20 Plum 20 Plum x20 Plum x20   Wall slides: fwd/scap   np  20x 10 ea 20 ea   Post capsule stretch     20\" 3 20\"x3 20\"x3                                                     THERAPEUTIC ACTIVITY          5 [place] placing fwd on shelf  3x 3x  3x     5 cone placing side on shelf  3x 3x  3x     [place]   20\" 3  Red  20\" 3     High target taps " on white white   2x5  2x5     GAIT TRAINING                                                  MODALITIES         MHP + TENs post supine 10' post 10' post 10' post 10' post 10' post 10' post                      Media Information

## 2025-02-19 ENCOUNTER — OFFICE VISIT (OUTPATIENT)
Dept: PHYSICAL THERAPY | Facility: CLINIC | Age: 70
End: 2025-02-19
Payer: MEDICARE

## 2025-02-19 DIAGNOSIS — Z47.89 ENCOUNTER FOR OTHER ORTHOPEDIC AFTERCARE: Primary | ICD-10-CM

## 2025-02-19 DIAGNOSIS — M75.101 TEAR OF RIGHT SUPRASPINATUS TENDON: ICD-10-CM

## 2025-02-19 PROCEDURE — 97140 MANUAL THERAPY 1/> REGIONS: CPT | Performed by: PHYSICAL THERAPIST

## 2025-02-19 PROCEDURE — 97110 THERAPEUTIC EXERCISES: CPT | Performed by: PHYSICAL THERAPIST

## 2025-02-19 NOTE — PROGRESS NOTES
Daily Note     Today's date: 2025  Patient name: Kasie Kimble  : 1955  MRN: [id number]  Referring provider: Mitchell Grace*  Dx:   Encounter Diagnosis     ICD-10-CM    1. Encounter for other orthopedic aftercare  Z47.89       2. Tear of right supraspinatus tendon  M75.101                        Subjective: Patient reports shoulder tightness and soreness. Patient states to be compliance with HEP. Patient states she feels pain at end range with both abduction and flexion but able to return to baseline post exercise.   Current Status: Pt reports feeling a bit better since lv  New Symptoms/ Problems/ Changes to Established Plan: no new sx's  Response to Last Treatment - good  HEP/ Activity Recommendations- n/a  Progress Towards Goals: wants to return to weight lifting and exercise        Objective: See treatment diary below      Assessment:   Response to manual treatment: Held IASTM, patient responded well to    Form with exercises: good form with gentle TE. Wall slide shoulder flexion was provided, patient reported minimal pain at end range but able to return to baseline post exercise.   Response to exercises: increased ROM and strength.   Modifications in treatment: n/a  Recommendations - continue HEP      Plan: Continue per plan of care. Continue to progress according to MD protocol.     Precautions: PHASE 3 PER MD protocol below flowsheet  CO-MORBIDITIES:  HEP ACCESS CODE:  [id number]  FOTO Completed On: 1/3 (Score:  63  Score Predication:  66 )      POC Expires Reeval for Medicare to be completed Unit LImit Auth Expiration Date PT/OT/STVisit Limit   3/7/25 By visit  47 N/a N/a N/a    Completed on 25                   TREATMENT DIARY  Auth Status DATE     Approved Visit # 34 35 36 37 38     Remaining         MANUAL THERAPY         GHJ mobs grade II inf/post   5' 5' 5'    R shoulder PROM/ MFR 15' 10' +IASTM 15' + GISTM 15' 5'    R upper trap/ rhomboid MFR     5'   "  Progress note    5'                       THERAPEUTIC EXERCISE HEP         UBE  3'/3' 3'/3' 3'/3' 3'/3' 3/3    Pulleys: Scaption  20x    20x    Pulleys: Flexion  20x    20x    Standing Scaption          Standing Flexion          Bicep curls   4# 20 4# x20  4# x20       Hammer Curls   4# 20 4# x20  4# x20       Standing IR stretch with strap  10\" 5   10\"x3 20x              Supine AROM Flex  AAROM 20x AROM 20x AAROM 4# 10x5\" AAROM 3# 10x5\" Aarom 4# 10x5'    S/l AROM ABD   20x 10 1#      S/l AROM ER   20x 10 1#      Wall push ups   20x       Wall slide shoulder flexion       4# x20                                                                   NEUROMUSCULAR REEDUCATION           Supine punches  0# 20x 0# 20       TB LPD  GTB 20 [place] 20 [place] x20 Plum x20     TB row  GTB  20 Plum 20 Plum x20 Plum x20     TB IR  GTB 20 [place] 20 Plum x20 Plum x20     TB ER  GTB 20 [place] 20 [place] x20 Plum x20     Wall slides: fwd/scap   20x 10 ea 20 ea     Post capsule stretch   20\" 3 20\"x3 20\"x3                                                       THERAPEUTIC ACTIVITY          5 [place] placing fwd on shelf   3x       5 cone placing side on shelf   3x       [place]   Red  20\" 3       High target taps on white white   2x5       GAIT TRAINING                                                  MODALITIES         MHP + TENs post supine 10' post 10' post 10' post 10' post 5' post tx                       Media Information                                           Media Information              "

## 2025-02-21 ENCOUNTER — OFFICE VISIT (OUTPATIENT)
Dept: PHYSICAL THERAPY | Facility: CLINIC | Age: 70
End: 2025-02-21
Payer: MEDICARE

## 2025-02-21 DIAGNOSIS — Z47.89 ENCOUNTER FOR OTHER ORTHOPEDIC AFTERCARE: Primary | ICD-10-CM

## 2025-02-21 DIAGNOSIS — M75.101 TEAR OF RIGHT SUPRASPINATUS TENDON: ICD-10-CM

## 2025-02-21 PROCEDURE — 97140 MANUAL THERAPY 1/> REGIONS: CPT | Performed by: PHYSICAL THERAPIST

## 2025-02-21 PROCEDURE — 97110 THERAPEUTIC EXERCISES: CPT | Performed by: PHYSICAL THERAPIST

## 2025-02-21 NOTE — PROGRESS NOTES
Daily Note     Today's date: 2025  Patient name: Kasie Kimlbe  : 1955  MRN: [id number]  Referring provider: Mitchell Grace*  Dx:   Encounter Diagnosis     ICD-10-CM    1. Encounter for other orthopedic aftercare  Z47.89       2. Tear of right supraspinatus tendon  M75.101                      Subjective: Patient states soreness & pain at end range shoulder flexion.       Objective: See treatment diary below      Assessment: Tolerated treatment well. Patient demonstrated pain and limited rom with shoulder flexion. Patient was given demonstrated tenderness with palpation of the anterior & posterior shoulder musculature & was provided STM to improve shoulder flexion. Patient responded well to maintained TE . Reassessment of shoulder flexion improved with rom and minimal soreness. Patient ended session with moist heat pack on the anterior and posterior shoulder to minimize the pain.        Plan: Continue skilled physical therapy to improve flexibility , mobility , and strength to return to plof.     Precautions: PHASE 3 PER MD protocol below flowsheet  CO-MORBIDITIES:  HEP ACCESS CODE:  [id number]  FOTO Completed On: 1/3 (Score:  63  Score Predication:  66 )      POC Expires Reeval for Medicare to be completed Unit LImit Auth Expiration Date PT/OT/STVisit Limit   3/7/25 By visit  47 N/a N/a N/a    Completed on 25                   TREATMENT DIARY  Auth Status DATE    Approved Visit # 34 [phone] 39    Remaining         MANUAL THERAPY         GHJ mobs grade II inf/post   5' 5' 5'    R shoulder PROM/ MFR 15' 10' +IASTM 15' + GISTM 15' 5' 5'   R upper trap/ rhomboid MFR     5' 10'   Progress note    5'                       THERAPEUTIC EXERCISE HEP         UBE  3'/3' 3'/3' 3'/3' 3'/3' 3/3    Pulleys: Scaption  20x    20x 20x   Pulleys: Flexion  20x    20x 20x   Standing Scaption          Standing Flexion          Bicep curls   4# 20 4# x20  4# x20    4# x20  "  Hammer Curls   4# 20 4# x20  4# x20    4# x20   Standing IR stretch with strap  10\" 5   10\"x3 20x 20x              Supine AROM Flex  AAROM 20x AROM 20x AAROM 4# 10x5\" AAROM 3# 10x5\" Aarom 4# 10x5' Aarom 4# 10x5'   S/l AROM ABD   20x 10 1#      S/l AROM ER   20x 10 1#   20x #1    Wall push ups   20x       Wall slide shoulder flexion        x20    x20                                                               NEUROMUSCULAR REEDUCATION           Supine punches  0# 20x 0# 20       TB LPD  GTB 20 [place] 20 [place] x20 [place] x20     TB row  GTB  20 [place] 20 [place] x20 Plum x20     TB IR  GTB 20 [place] 20 [place] x20 Plum x20     TB ER  GTB 20 [place] 20 [place] x20 [place] x20     Wall slides: fwd/scap   20x 10 ea 20 ea     Post capsule stretch   20\" 3 20\"x3 20\"x3                                                       THERAPEUTIC ACTIVITY          5 [place] placing fwd on shelf   3x       5 cone placing side on shelf   3x       [place]   Red  20\" 3       High target taps on white white   2x5       GAIT TRAINING                                                  MODALITIES         MHP + TENs post supine 10' post 10' post 10' post 10' post 5' post tx                       Media Information                                           Media Information                   "

## 2025-02-26 ENCOUNTER — OFFICE VISIT (OUTPATIENT)
Dept: PHYSICAL THERAPY | Facility: CLINIC | Age: 70
End: 2025-02-26
Payer: MEDICARE

## 2025-02-26 DIAGNOSIS — Z47.89 ENCOUNTER FOR OTHER ORTHOPEDIC AFTERCARE: Primary | ICD-10-CM

## 2025-02-26 DIAGNOSIS — M75.101 TEAR OF RIGHT SUPRASPINATUS TENDON: ICD-10-CM

## 2025-02-26 PROCEDURE — 97140 MANUAL THERAPY 1/> REGIONS: CPT

## 2025-02-26 PROCEDURE — 97110 THERAPEUTIC EXERCISES: CPT

## 2025-02-26 NOTE — PROGRESS NOTES
"Daily Note     Today's date: 2025  Patient name: Kasie Kimble  : 1955  MRN: [id number]  Referring provider: Mitchell Grace*  Dx:   Encounter Diagnosis     ICD-10-CM    1. Encounter for other orthopedic aftercare  Z47.89       2. Tear of right supraspinatus tendon  M75.101                      Subjective: Patient states shoulder has been improving a lot since lv.       Objective: See treatment diary below      Assessment: Pt performed below TE with great tolerance, pt shldr ROM has improved greatly.         Plan: Continue skilled physical therapy to improve flexibility , mobility , and strength to return to plof.     Precautions: PHASE 3 PER MD protocol below flowsheet  CO-MORBIDITIES:  HEP ACCESS CODE:  [id number]  FOTO Completed On: 1/3 (Score:  63  Score Predication:  66 )      POC Expires Reeval for Medicare to be completed Unit LImit Auth Expiration Date PT/OT/STVisit Limit   3/7/25 By visit  47 N/a N/a N/a    Completed on 25                   TREATMENT DIARY  Auth Status DATE    Approved Visit # 37 [phone] 40    Remaining       MANUAL THERAPY       GHJ mobs grade II inf/post 5' 5'     R shoulder PROM/ MFR 15' 5' 5' 5'   R upper trap/ rhomboid MFR  5' 10' 5'   Progress note 5'                    THERAPEUTIC EXERCISE HEP       UBE  3'/3' 3/3  3'/3'   Pulleys: Scaption   20x 20x 20x   Pulleys: Flexion   20x 20x 20x   Standing Scaption        Standing Flexion        Bicep curls   4# x20    4# x20 5# 20   Hammer Curls   4# x20    4# x20 5# 20   Standing IR stretch with strap  10\"x3 20x 20x             Supine AROM Flex  AAROM 3# 10x5\" Aarom 4# 10x5' Aarom 4# 10x5'    S/l AROM ABD        S/l AROM ER    20x #1     Wall push ups        Wall slide shoulder flexion     x20    x20 2# 20   Mod Ext     5# 20   Mod Rows     5# 20                                   NEUROMUSCULAR REEDUCATION         Supine punches     5# 20   TB LPD  [place] x20      TB row  [place] x20      TB IR  " "Black x20      TB ER  [place] x20      Wall slides: fwd/scap  20 ea   2# 20   Post capsule stretch  20\"x3                                              THERAPEUTIC ACTIVITY        5 [place] placing fwd on shelf     2# 10   5 cone placing side on shelf     2# 10   [place]        High target taps on white white        GAIT TRAINING                                        MODALITIES       MHP + TENs post supine 10' post 5' post tx                      Media Information                                           Media Information                   "

## 2025-02-28 ENCOUNTER — OFFICE VISIT (OUTPATIENT)
Dept: PHYSICAL THERAPY | Facility: CLINIC | Age: 70
End: 2025-02-28
Payer: MEDICARE

## 2025-02-28 DIAGNOSIS — M75.101 TEAR OF RIGHT SUPRASPINATUS TENDON: ICD-10-CM

## 2025-02-28 DIAGNOSIS — Z47.89 ENCOUNTER FOR OTHER ORTHOPEDIC AFTERCARE: Primary | ICD-10-CM

## 2025-02-28 PROCEDURE — 97110 THERAPEUTIC EXERCISES: CPT

## 2025-02-28 PROCEDURE — 97140 MANUAL THERAPY 1/> REGIONS: CPT

## 2025-02-28 PROCEDURE — 97112 NEUROMUSCULAR REEDUCATION: CPT

## 2025-02-28 NOTE — PROGRESS NOTES
Daily Note     Today's date: 2025  Patient name: Kasie Kimble  : 1955  MRN: [id number]  Referring provider: Mitchell Grace*  Dx:   Encounter Diagnosis     ICD-10-CM    1. Encounter for other orthopedic aftercare  Z47.89       2. Tear of right supraspinatus tendon  M75.101                      Subjective: Patient states shoulder feels stronger.       Objective: See treatment diary below      Assessment: Pt performed below TE with great tolerance, pt shldr ROM has improved greatly. Progressed below TE and introduced new TE with good tolerance.         Plan: Continue skilled physical therapy to improve flexibility , mobility , and strength to return to plof.     Precautions: PHASE 3 PER MD protocol below flowsheet  CO-MORBIDITIES:  HEP ACCESS CODE:  [id number]  FOTO Completed On: 1/3 (Score:  63  Score Predication:  66 )      POC Expires Reeval for Medicare to be completed Unit LImit Auth Expiration Date PT/OT/STVisit Limit   3/7/25 By visit  47 N/a N/a N/a    Completed on 25                   TREATMENT DIARY  Auth Status DATE    Approved Visit # 39 [phone] 41    Remaining      MANUAL THERAPY      GHJ mobs grade II inf/post      R shoulder PROM/ MFR 5' 5' 5'   R upper trap/ rhomboid MFR 10' 5' 5'   Progress note                  THERAPEUTIC EXERCISE HEP      UBE   3'/3' 3'/3'   Pulleys: Scaption  20x 20x 20x   Pulleys: Flexion  20x 20x 20x   Bicep curls  4# x20 5# 20 5# 20   Hammer Curls  4# x20 5# 20 5# 20   Standing IR stretch with strap  20x   hep          Supine AROM Flex  [place] 4# 10x5'  2# 20   Wall push ups    Easy d/c   Mod table push ups    10   Mod Ext   5# 20 5# 20   Mod Rows   5# 20 5# 20                               NEUROMUSCULAR REEDUCATION        Supine punches   5# 20 5# 20   CC LDP    33# 20   CC Rows    33# 20   Wall slides: fwd/scap   2# 20 ea 2# 20 ea   Post capsule stretch    np                                      THERAPEUTIC ACTIVITY       5 [place] placing  fwd on shelf   2# 5x 2# 5x   5 cone placing side on shelf   2# 5x 2# 5x   [place]    np   High target taps on white white    np   GAIT TRAINING                                   MODALITIES      MHP + TENs post supine   10' post                   Media Information                                           Media Information

## 2025-03-05 ENCOUNTER — OFFICE VISIT (OUTPATIENT)
Dept: PHYSICAL THERAPY | Facility: CLINIC | Age: 70
End: 2025-03-05
Payer: MEDICARE

## 2025-03-05 DIAGNOSIS — Z47.89 ENCOUNTER FOR OTHER ORTHOPEDIC AFTERCARE: Primary | ICD-10-CM

## 2025-03-05 DIAGNOSIS — M75.101 TEAR OF RIGHT SUPRASPINATUS TENDON: ICD-10-CM

## 2025-03-05 PROCEDURE — 97110 THERAPEUTIC EXERCISES: CPT

## 2025-03-05 PROCEDURE — 97112 NEUROMUSCULAR REEDUCATION: CPT

## 2025-03-05 PROCEDURE — 97140 MANUAL THERAPY 1/> REGIONS: CPT

## 2025-03-05 NOTE — PROGRESS NOTES
Daily Note     Today's date: 3/5/2025  Patient name: Kasie Kimble  : 1955  MRN: [id number]  Referring provider: Mitchell Grace*  Dx:   Encounter Diagnosis     ICD-10-CM    1. Encounter for other orthopedic aftercare  Z47.89       2. Tear of right supraspinatus tendon  M75.101           Start Time: 1015  Stop Time: 1102  Total time in clinic (min): 47 minutes    Subjective: Pt denies any change in status upon arrival.       Objective: See treatment diary below      Assessment: Tolerated treatment well. Exercises performed as per POC noted below. Joint mobs performed by primary therapist. Pt had no pain nor exacerbations throughout tx. Education on DOMS and HEP provided. Patient demonstrated fatigue post treatment, exhibited good technique with therapeutic exercises, and would benefit from continued PT      Plan: Continue per plan of care.  Progress treatment as tolerated.       Precautions: PHASE 3 PER MD protocol below flowsheet  CO-MORBIDITIES:  HEP ACCESS CODE:  [id number]  FOTO Completed On: 1/3 (Score:  63  Score Predication:  66 )      POC Expires Reeval for Medicare to be completed Unit LImit Auth Expiration Date PT/OT/STVisit Limit   3/7/25 By visit  47 N/a N/a N/a    Completed on 25                   TREATMENT DIARY  Auth Status DATE 2/21 2/26 2/28 3/5   Approved Visit # 39 40 41 42    Remaining       MANUAL THERAPY       GHJ mobs grade II inf/post    TH    R shoulder PROM/ MFR 5' 5' 5' 5'   R upper trap/ rhomboid MFR 10' 5' 5' 5'   Progress note                     THERAPEUTIC EXERCISE HEP       UBE   3'/3' 3'/3' 3'/3'   Pulleys: Scaption  20x 20x 20x 20x   Pulleys: Flexion  20x 20x 20x 20x   Bicep curls  4# x20 5# 20 5# 20 5# 20   Hammer Curls  4# x20 5# 20 5# 20 5# 20   Standing IR stretch with strap  20x   hep            Supine AROM Flex  [place] 4# 10x5'  2# 20 2# 20   Wall push ups    Easy d/c    Mod table push ups    10 10   Mod Ext   5# 20 5# 20 5# 20   Mod Rows   5# 20 5#  20 5# 20                                   NEUROMUSCULAR REEDUCATION         Supine punches   5# 20 5# 20 5# 20   CC LDP    33# 20 33# 20   CC Rows    33# 20 33# 20   Wall slides: fwd/scap   2# 20 ea 2# 20 ea 2# 20 ea   Post capsule stretch    np                                            THERAPEUTIC ACTIVITY        5 [place] placing fwd on shelf   2# 5x 2# 5x 2# 5x   5 cone placing side on shelf   2# 5x 2# 5x 2# 5x   [place]    np    High target taps on white white    np    GAIT TRAINING                                        MODALITIES       MHP + TENs post supine   10' post                     Media Information                                           Media Information

## 2025-03-07 ENCOUNTER — APPOINTMENT (OUTPATIENT)
Dept: PHYSICAL THERAPY | Facility: CLINIC | Age: 70
End: 2025-03-07
Payer: MEDICARE

## 2025-03-12 ENCOUNTER — EVALUATION (OUTPATIENT)
Dept: PHYSICAL THERAPY | Facility: CLINIC | Age: 70
End: 2025-03-12
Payer: MEDICARE

## 2025-03-12 DIAGNOSIS — Z47.89 ENCOUNTER FOR OTHER ORTHOPEDIC AFTERCARE: Primary | ICD-10-CM

## 2025-03-12 DIAGNOSIS — M75.101 TEAR OF RIGHT SUPRASPINATUS TENDON: ICD-10-CM

## 2025-03-12 PROCEDURE — 97140 MANUAL THERAPY 1/> REGIONS: CPT | Performed by: PHYSICAL THERAPIST

## 2025-03-12 PROCEDURE — 97110 THERAPEUTIC EXERCISES: CPT | Performed by: PHYSICAL THERAPIST

## 2025-03-12 PROCEDURE — 97112 NEUROMUSCULAR REEDUCATION: CPT | Performed by: PHYSICAL THERAPIST

## 2025-03-12 NOTE — PROGRESS NOTES
Daily Note     Today's date: 3/12/2025  Patient name: Kasie Kimble  : 1955  MRN: [id number]  Referring provider: Mitchell Grace*  Dx:   Encounter Diagnosis     ICD-10-CM    1. Encounter for other orthopedic aftercare  Z47.89       2. Tear of right supraspinatus tendon  M75.101                      Subjective: Pt reports she was feeling good with her shoulder until she played pickle ball on Monday and did a backhand movement. Pt felt a sudden sharp pain and she was very concerned. Pt notes that over the next few days, her pain has lessened. Pt is compliant in HEP and performs yoga, exercises with weights on a regular basis.      Objective: See treatment diary below      Assessment: Tolerated treatment well. Patient exhibited good technique with therapeutic exercises. Initiated prone scapular ex and was challenged by these exercises. Modified some overhead activity with weight. Pt had more soreness today with manual therapy compared to last week.      Plan: Continue per plan of care. Focus on improving end ROM, improving scapular strength , and improving overhead strength.     Precautions: PHASE 3 PER MD protocol below flowsheet  CO-MORBIDITIES:  HEP ACCESS CODE:  [id number]  FOTO Completed On: 1/3 (Score:  63  Score Predication:  66 )      POC Expires Reeval for Medicare to be completed Unit LImit Auth Expiration Date PT/OT/STVisit Limit   25 By visit  47 N/a N/a N/a    Completed on 25                   TREATMENT DIARY  Auth Status DATE 2/21 2/26 2/28 3/5 3/12   Approved Visit # 39 40 41 42 43    Remaining        MANUAL THERAPY        GHJ mobs grade II inf/post    TH  3'   R shoulder PROM/ MFR 5' 5' 5' 5' 15'   R upper trap/ rhomboid MFR 10' 5' 5' 5'    Progress note                        THERAPEUTIC EXERCISE HEP        UBE   3'/3' 3'/3' 3'/3' 3'/3'   Pulleys: Scaption  20x 20x 20x 20x HEP   Pulleys: Flexion  20x 20x 20x 20x HEP   Bicep curls  4# x20 5# 20 5# 20 5# 20    Hammer Curls  " 4# x20 5# 20 5# 20 5# 20    Standing IR stretch with strap  20x   hep              Supine AROM Flex  [place] 4# 10x5'  2# 20 2# 20 3# AAROM 10 x5\"   Wall push ups    Easy d/c     Mod table push ups    10 10    Mod Ext   5# 20 5# 20 5# 20 [place] 1# x10   Mod Rows   5# 20 5# 20 5# 20 [place] 3# x10   Shoulder flexion AROM to 90*      1# x10   Shoulder scap to 90*      1# x10                     NEUROMUSCULAR REEDUCATION          Supine punches   5# 20 5# 20 5# 20 5# x20   CC LDP    33# 20 33# 20    CC Rows    33# 20 33# 20    Wall slides: fwd/scap   2# 20 ea 2# 20 ea 2# 20 ea 10x5\" ea   Post capsule stretch    np  3x20\"                                                THERAPEUTIC ACTIVITY         5 [place] placing fwd on shelf   2# 5x 2# 5x 2# 5x    5 cone placing side on shelf   2# 5x 2# 5x 2# 5x    Walking w/ wt overhead      1# small lap   [place]    np     High target taps on white white    np     GAIT TRAINING                                             MODALITIES        MHP + TENs post supine   10' post  10' MH only                     Media Information                                           Media Information                       "

## 2025-03-14 ENCOUNTER — OFFICE VISIT (OUTPATIENT)
Dept: PHYSICAL THERAPY | Facility: CLINIC | Age: 70
End: 2025-03-14
Payer: MEDICARE

## 2025-03-14 DIAGNOSIS — M75.101 TEAR OF RIGHT SUPRASPINATUS TENDON: ICD-10-CM

## 2025-03-14 DIAGNOSIS — Z47.89 ENCOUNTER FOR OTHER ORTHOPEDIC AFTERCARE: Primary | ICD-10-CM

## 2025-03-14 PROCEDURE — 97110 THERAPEUTIC EXERCISES: CPT

## 2025-03-14 PROCEDURE — 97140 MANUAL THERAPY 1/> REGIONS: CPT

## 2025-03-14 PROCEDURE — 97112 NEUROMUSCULAR REEDUCATION: CPT

## 2025-03-14 NOTE — PROGRESS NOTES
"Daily Note     Today's date: 3/14/2025  Patient name: Kasie Kimble  : 1955  MRN: [id number]  Referring provider: Mitchell Grace*  Dx:   Encounter Diagnosis     ICD-10-CM    1. Encounter for other orthopedic aftercare  Z47.89       2. Tear of right supraspinatus tendon  M75.101                      Subjective: Pt states swinging her racket during pickleball hurt the most swinging back.    Objective: See treatment diary below      Assessment: Progressed below TE with good tolerance and technique. Introduced PNF D2 extension to aid in pt pickleball technique and strength.       Plan: Continue per plan of care.      Precautions: PHASE 3 PER MD protocol below flowsheet  CO-MORBIDITIES:  HEP ACCESS CODE:  [id number]  FOTO Completed On: 1/3 (Score:  63  Score Predication:  66 )      POC Expires Reeval for Medicare to be completed Unit LImit Auth Expiration Date PT/OT/STVisit Limit   25 By visit  47 N/a N/a N/a    Completed on 25                   TREATMENT DIARY  Auth Status DATE 2/21 2/26 2/28 3/5 3/12 3/14   Approved Visit # 39 40 41 42 43 44    Remaining         MANUAL THERAPY         GHJ mobs grade II inf/post    TH  3'    R shoulder PROM/ MFR 5' 5' 5' 5' 15' 10'   R upper trap/ rhomboid MFR 10' 5' 5' 5'     Progress note                           THERAPEUTIC EXERCISE HEP         UBE   3'/3' 3'/3' 3'/3' 3'/3' 3'/3'   Pulleys: Scaption  20x 20x 20x 20x HEP    Pulleys: Flexion  20x 20x 20x 20x HEP    Bicep curls  4# x20 5# 20 5# 20 5# 20  5# 20   Hammer Curls  4# x20 5# 20 5# 20 5# 20  5# 20   Standing IR stretch with strap  20x   hep                Supine AROM Flex  [place] 4# 10x5'  2# 20 2# 20 3# AAROM 10 x5\" 3# Aarom   Wall push ups    Easy d/c      Mod table push ups    10 10     Mod Ext   5# 20 5# 20 5# 20 [place] 1# x10 5# 10   Mod Rows   5# 20 5# 20 5# 20 Prone 3# x10 5# 10   Prone Rows       3# 10   Prone Ext       3# 10   Shoulder flexion AROM to 90*      1# x10 1#10   Shoulder scap " "to 90*      1# x10 1# 10   PNF D2 ext       GTB 10             NEUROMUSCULAR REEDUCATION           Supine punches   5# 20 5# 20 5# 20 5# x20 5# `0   CC LDP    33# 20 33# 20  33# 20   CC Rows    33# 20 33# 20  33# 20   Wall slides: fwd/scap   2# 20 ea 2# 20 ea 2# 20 ea 10x5\" ea 2# 10 ea   Post capsule stretch    np  3x20\"                                                      THERAPEUTIC ACTIVITY          5 [place] placing fwd on shelf   2# 5x 2# 5x 2# 5x  2# 5x   5 cone placing side on shelf   2# 5x 2# 5x 2# 5x  2# 5x   Walking w/ wt overhead      1# small lap 1# sm lap  3x   [place]    np   Red 10\" 3   High target taps on white white    np      GAIT TRAINING                                                  MODALITIES         MHP + TENs post supine   10' post  10' MH only 10' MHP post                      Media Information                                           Media Information                       "

## 2025-03-26 ENCOUNTER — EVALUATION (OUTPATIENT)
Dept: PHYSICAL THERAPY | Facility: CLINIC | Age: 70
End: 2025-03-26
Payer: MEDICARE

## 2025-03-26 DIAGNOSIS — M75.101 TEAR OF RIGHT SUPRASPINATUS TENDON: ICD-10-CM

## 2025-03-26 DIAGNOSIS — Z47.89 ENCOUNTER FOR OTHER ORTHOPEDIC AFTERCARE: Primary | ICD-10-CM

## 2025-03-26 PROCEDURE — 97110 THERAPEUTIC EXERCISES: CPT | Performed by: PHYSICAL THERAPIST

## 2025-03-26 PROCEDURE — 97140 MANUAL THERAPY 1/> REGIONS: CPT | Performed by: PHYSICAL THERAPIST

## 2025-03-26 PROCEDURE — 97112 NEUROMUSCULAR REEDUCATION: CPT | Performed by: PHYSICAL THERAPIST

## 2025-03-26 NOTE — PROGRESS NOTES
PT Re-Evaluation     Today's date: 3/26/2025  Patient name: Kasie Kimble  : 1955  MRN: [id number]  Referring provider: Mitchell Grace*  Dx:   Encounter Diagnosis     ICD-10-CM    1. Encounter for other orthopedic aftercare  Z47.89       2. Tear of right supraspinatus tendon  M75.101                      Assessment  Impairments: abnormal or restricted ROM, activity intolerance, impaired physical strength and pain with function    Assessment details: Since starting skilled PT, pain levels are decreased, R shoulder ROM is improving, R shoulder strength is improving with good functional progress. Recommend pt continue skilled PT for 4-6 more weeks with progression to HEP.  Understanding of Dx/Px/POC: good     Prognosis: good    Goals  STG's ( 3-4 weeks)  1. Pt will be independent in HEP-met  2. Pt will have improved R shoulder flex and scaption PROM to 90* in 4 weeks- met  LTG's ( 6- 8 weeks)  1. Improve FOTO score by 8-10 points in 6-8 weeks-  met  2. Pt will have decreased pain to 3/10 at worst- met  3. Pt will have improved R shoulder AROM by 10-15*- met  4. Pt will be able to sleep in bed with greater ease- partial met  5. Pt will return to driving independently.- met    Plan  Patient would benefit from: skilled physical therapy  Planned modality interventions: cryotherapy and TENS    Planned therapy interventions: manual therapy, joint mobilization, neuromuscular re-education, strengthening, stretching, therapeutic activities, therapeutic exercise, functional ROM exercises, flexibility and home exercise program    Frequency: 2x week  Duration in weeks: 8  Plan of Care beginning date: 3/26/2025  Plan of Care expiration date: 2025  Treatment plan discussed with: PTA and patient        Subjective Evaluation    History of Present Illness  Mechanism of injury: I.E; Pt had skilled PT for R shoulder for RTC tendonitis and was discharged with resolution of symptoms May 2024. Pt started to  experience increased R shoulder pain. MRI testing showed a full thickness supraspinatus tear and biceps tendon tear. Pt underwent RTC repair, biceps tenodesis and SAD on 9/25/24.   Pt arrives to skilled PT with her arm in a sling. Pt is limited with all functional activities involving her R UE. Pt is taking Advil and Tylenol every 3 hours. Pt has increased pain and difficulty sleeping and is currently sleeping in a reclining chair.     10/25/24; Pt is compliant in HEP. Pt has no change in functional activities 2* keeping her arm in the sling and following MD protocol. Pt has noticed that her elbow is not as painful when taking her arm out the sling to do her exercises.    11/18/24: Pt is compliant in HEP. Pt has returned to driving activities. Pt needs to bend over to wash her hair or complete dressing activities. Pt needs to put her R arm in first and put the head open over next. Pt had increased pain when sleeping at night last night that radiated down the back of her arm.    12/18/24: Pt is compliant in HEP. Pt is intentional with her reaching. Pt can feel increased pain with reaching activities. Pt notes washing her hair is pretty easy, but is still putting her R arm in first when dressing. Pt no longer has pain radiating down her arm. Pt can still have pain when sleeping at night. Pt was able to drive a longer distance to [place], [place]. Pt does not hesitate to drive.    1/10/25: Pt is putting her R arm in her sleeve normally, without thinking about it. Pt feels pain at night and wakes up with stiffness almost every morning. Pt has released her to return to riding her bike. Pt is able to curl the back of her hair with a curling iron. Pt reports her pain is not constant. Pt feels relief with moist heat.    2/14/25: PT is compliant in HEP. Pt continues to struggle with overhead reaching at end ROM. Pt has returned to normal household cleaning and cooking activities. Pt is able to lift a grocery bag and laundry  basket. Pt continues to wake up with soreness in the morning and feels relief with moist heat.    3/26/25; Pt is compliant in HEP. Pt is improving with overhead activities. Pt was able to go bike riding for several days in [place], [place] and biked for 10-20 miles per day with soreness the first day, that resolved quickly. Pt is waking up with soreness, but it resolves quickly.    Work; retired  Hobbies: traveling, bike riding, exercising  Gait; no abnormalities  Patient Goals  Patient goals for therapy: decreased pain  Patient goal: full ROM; to return to weight training for my shoulders and arm  Pain  At best pain ratin  At worst pain ratin  Location: R shoulder  Quality: dull ache    Social Support  Lives with: alone    Employment status: not working  Hand dominance: right      Diagnostic Tests  MRI studies: abnormal  Treatments  Previous treatment: physical therapy (prior to surgery)        Objective     Neurological Testing     Sensation     Shoulder   Left Shoulder   Intact: light touch    Right Shoulder   Intact: Light touch    Reflexes   Left   Biceps (C5/C6): normal (2+)  Brachioradialis (C6): normal (2+)  Triceps (C7): normal (2+)    Additional Neurological Details  R UE reflexes NT    Active Range of Motion   Left Shoulder   Flexion: 170 degrees     Right Shoulder   Flexion: 150 degrees with pain  Abduction: 170 degrees   External rotation 45°: 68 degrees   Internal rotation 45°: 78 degrees     Additional Active Range of Motion Details  I.E: R shoulder AROM: NT  R elbow AROM; NT    Passive Range of Motion     Right Shoulder   Flexion: 175 degrees   Abduction: 178 degrees   External rotation 45°: 85 degrees   Internal rotation 45°: 85 degrees     Right Elbow   Flexion: 130 degrees with pain  Extension: -10 degrees with pain    Additional Passive Range of Motion Details  (+) TTP R anterior shoulder  Increased muscle tone R upper trap/ levator scap  Cervical ROM limited with SB and rotation ROM  R  "shoulder strength; NT      Strength/Myotome Testing     Right Shoulder     Planes of Motion   Flexion: 5   Abduction: 4+   External rotation at 0°: 5   Internal rotation at 0°: 5     Additional Strength Details  Slight shoulder shrug with shoulder flexion and abduction ROM      Precautions: PHASE 3 PER MD protocol below flowsheet  CO-MORBIDITIES:  HEP ACCESS CODE:  [id number]  FOTO Completed On: 3/26 (Score:  68  Score Predication:  66 )- pt exceeded score- do not repeat      POC Expires Reeval for Medicare to be completed Unit LImit Auth Expiration Date PT/OT/STVisit Limit   5/2125 By visit  55 N/a N/a N/a    Completed on 3/26/25                   TREATMENT DIARY  Auth Status DATE 2/21 2/26 2/28 3/5 3/12 3/14 3/26   Approved Visit # [phone] 43 44 45    Remaining          MANUAL THERAPY          GHJ mobs grade II inf/post    TH  3'  2'   R shoulder PROM/ MFR 5' 5' 5' 5' 15' 10' 10'   R upper trap/ rhomboid MFR 10' 5' 5' 5'      Progress note       8'                       THERAPEUTIC EXERCISE HEP          UBE   3'/3' 3'/3' 3'/3' 3'/3' 3'/3' 3'/3'   Pulleys: Scaption  20x 20x 20x 20x HEP     Pulleys: Flexion  20x 20x 20x 20x HEP     Bicep curls  4# x20 5# 20 5# 20 5# 20  5# 20    Hammer Curls  4# x20 5# 20 5# 20 5# 20  5# 20    Standing IR stretch with strap  20x   hep    10\"x3   B TB ER        BTB x10   Supine AROM Flex  [place] 4# 10x5'  2# 20 2# 20 3# AAROM 10 x5\" 3# Aarom    S/l shld ER AROM    Easy d/c    3# x15   Mod table push ups    10 10   x20   Mod Ext   5# 20 5# 20 5# 20 Prone 1# x10 5# 10 dup   Prone horiz abd   5# 20 5# 20 5# 20 Prone 3# x10 5# 10 x10   Prone Rows       3# 10 3# x15   Prone Ext       3# 10 3# x15   Shoulder flexion AROM to 90*      1# x10 1#10    Shoulder scap to 90*      1# x10 1# 10    PNF D2 ext       GTB 10 GTB x10              NEUROMUSCULAR REEDUCATION            Supine punches   5# 20 5# 20 5# 20 5# x20 5# `0    CC LDP    33# 20 33# 20  33# 20 33# x20   CC Rows    33# 20 33# 20  " "33# 20 33# x20   Wall slides: fwd/scap   2# 20 ea 2# 20 ea 2# 20 ea 10x5\" ea 2# 10 ea X5 ea   Post capsule stretch    np  3x20\"  3x20\"                                                          THERAPEUTIC ACTIVITY           5 [place]one placing fwd on shelf   2# 5x 2# 5x 2# 5x  2# 5x    5 cone placing side on shelf   2# 5x 2# 5x 2# 5x  2# 5x    Walking w/ wt overhead      1# small lap 1# sm lap  3x 1# 3 small laps   [place]    np   Red 10\" 3 Red 10\"x3   High target taps on white white    np    1# x10   GAIT TRAINING                                                       MODALITIES          MHP + TENs post supine   10' post  10' MH only 10' MHP post 10' MH post                     Media Information                                    "

## 2025-03-28 ENCOUNTER — OFFICE VISIT (OUTPATIENT)
Dept: PHYSICAL THERAPY | Facility: CLINIC | Age: 70
End: 2025-03-28
Payer: MEDICARE

## 2025-03-28 DIAGNOSIS — Z47.89 ENCOUNTER FOR OTHER ORTHOPEDIC AFTERCARE: Primary | ICD-10-CM

## 2025-03-28 DIAGNOSIS — M75.101 TEAR OF RIGHT SUPRASPINATUS TENDON: ICD-10-CM

## 2025-03-28 PROCEDURE — 97110 THERAPEUTIC EXERCISES: CPT

## 2025-03-28 PROCEDURE — 97140 MANUAL THERAPY 1/> REGIONS: CPT

## 2025-03-28 PROCEDURE — 97112 NEUROMUSCULAR REEDUCATION: CPT

## 2025-03-28 NOTE — PROGRESS NOTES
"Daily Note     Today's date: 3/28/2025  Patient name: Kasie Kimble  : 1955  MRN: [id number]  Referring provider: Mitchell Grace*  Dx:   Encounter Diagnosis     ICD-10-CM    1. Encounter for other orthopedic aftercare  Z47.89       2. Tear of right supraspinatus tendon  M75.101                      Subjective: Pt states she worked out before coming to PT.       Objective: See treatment diary below      Assessment: Pt performed below TE with good tolerance and technique, despite feeling sore prior to PT.     Plan: Continue per plan of care.     Precautions: PHASE 3 PER MD protocol below flowsheet  CO-MORBIDITIES:  HEP ACCESS CODE:  [id number]  FOTO Completed On: 3/26 (Score:  68  Score Predication:  66 )- pt exceeded score- do not repeat      POC Expires Reeval for Medicare to be completed Unit [facility]mit Auth Expiration Date PT/OT/STVisit Limit   2125 By visit  55 N/a N/a N/a    Completed on 3/26/25                   TREATMENT DIARY  Auth Status DATE 3/12 3/14 3/26 3/38   Approved Visit # [phone]    Remaining       MANUAL THERAPY       GHJ mobs grade II inf/post 3'  2'    R shoulder PROM/ MFR 15' 10' 10' 10'   R upper trap/ rhomboid MFR       Progress note   8'                  THERAPEUTIC EXERCISE HEP       UBE  3'/3' 3'/3' 3'/3' 3'/3'   Pulleys: Scaption  HEP      Pulleys: Flexion  HEP      Bicep curls   5# 20  5# 20   Hammer Curls   5# 20  5# 20   Standing IR stretch with strap    10\"x3    B TB ER    BTB x10 [place] 20   Supine AROM Flex  3# AAROM 10 x5\" 3# Aarom     S/l shld ER AROM    3# x15    Mod table push ups    x20 20   Prone horiz abd  Prone 3# x10 5# 10 x10    Prone Rows   3# 10 3# x15 3# 20   Prone Ext   3# 10 3# x15 3# 20   Shoulder flexion AROM to 90*  1# x10 1#10     Shoulder scap to 90*  1# x10 1# 10     PNF D2 ext   GTB 10 GTB x10 GTB 10           NEUROMUSCULAR REEDUCATION         Supine punches  5# x20 5# `0  5# 20   CC LDP   33# 20 33# x20 33# 20   CC Rows   33# 20 33# x20 33# " "20   Wall slides: fwd/scap  10x5\" ea 2# 10 ea X5 ea 10x ea   Post capsule stretch  3x20\"  3x20\"                                            THERAPEUTIC ACTIVITY        5 Cone placing fwd on shelf   2# 5x     5 cone placing side on shelf   2# 5x     Walking w/ wt overhead  1# small lap 1# sm lap  3x 1# 3 small laps 1# small 3laps   [place]   Red 10\" 3 Red 10\"x3 Grn 10\" 3   High target taps on white white    1# x10 1# 10   GAIT TRAINING                                        MODALITIES       MHP + TENs post supine 10' MH only 10' MHP post 10' MH post 10' MH post                  Media Information                        "

## 2025-04-02 ENCOUNTER — OFFICE VISIT (OUTPATIENT)
Dept: PHYSICAL THERAPY | Facility: CLINIC | Age: 70
End: 2025-04-02
Payer: MEDICARE

## 2025-04-02 DIAGNOSIS — Z47.89 ENCOUNTER FOR OTHER ORTHOPEDIC AFTERCARE: Primary | ICD-10-CM

## 2025-04-02 DIAGNOSIS — M75.101 TEAR OF RIGHT SUPRASPINATUS TENDON: ICD-10-CM

## 2025-04-02 PROCEDURE — 97112 NEUROMUSCULAR REEDUCATION: CPT

## 2025-04-02 PROCEDURE — 97140 MANUAL THERAPY 1/> REGIONS: CPT

## 2025-04-02 PROCEDURE — 97110 THERAPEUTIC EXERCISES: CPT

## 2025-04-02 NOTE — PROGRESS NOTES
"Daily Note     Today's date: 2025  Patient name: Kasie Kimble  : 1955  MRN: [id number]  Referring provider: Mitchell Grace*  Dx:   Encounter Diagnosis     ICD-10-CM    1. Encounter for other orthopedic aftercare  Z47.89       2. Tear of right supraspinatus tendon  M75.101                      Subjective: Pt states feeling good today.       Objective: See treatment diary below      Assessment: Progressed below TE with good tolerance, pt was challenged with progression and planks.     Plan: Continue per plan of care.     Precautions: PHASE 3 PER MD protocol below flowsheet  CO-MORBIDITIES:  HEP ACCESS CODE:  [id number]  FOTO Completed On: 3/26 (Score:  68  Score Predication:  66 )- pt exceeded score- do not repeat      POC Expires Reeval for Medicare to be completed Unit LImit Auth Expiration Date PT/OT/STVisit Limit   2125 By visit  55 N/a N/a N/a    Completed on 3/26/25                   TREATMENT DIARY  Auth Status DATE 3/26 3/38 4/2   Approved Visit # [phone]    Remaining      MANUAL THERAPY      GHJ mobs grade II inf/post 2'     R shoulder PROM/ MFR 10' 10' 10'   R upper trap/ rhomboid MFR      Progress note 8'                 THERAPEUTIC EXERCISE HEP      UBE  3'/3' 3'/3' 3'/3'   Bicep curls   5# 20 5# 20   Hammer Curls   5# 20 5# 20   Standing IR stretch with strap  10\"x3     B TB ER  BTB x10 [place] 20 [place]um 20   Supine AROM Flex       S/l shld ER AROM  3# x15     Mod table push ups  x20 20 10   Planks    10\" 3   Prone Rows  3# x15 3# 20 3# 20   Prone Ext  3# x15 3# 20 3# 20   Shoulder flexion AROM to 90*       Shoulder scap to 90*       PNF D2 ext  GTB x10 GTB 10 GTB 20x          NEUROMUSCULAR REEDUCATION        Supine punches   5# 20 5# 20   CC LDP  33# x20 33# 20 44# 20   CC Rows  33# x20 33# 20 44# 20   Wall slides: fwd/scap  X5 ea 10x ea    Post capsule stretch  3x20\"                                        THERAPEUTIC ACTIVITY       5 Cone placing fwd on shelf       5 cone " "placing side on shelf       Walking w/ wt overhead  1# 3 small laps 1# small 3laps 2# lrg 3laps   Stat[place]  Red 10\"x3 Grn 10\" 3 GRN 10\" 3   High target taps on white white  1# x10 1# 10 2# 10   GAIT TRAINING                                   MODALITIES      MHP + TENs post supine 10' MH post 10' MH post 10' MH post                 Media Information                        "

## 2025-04-08 ENCOUNTER — EVALUATION (OUTPATIENT)
Dept: PHYSICAL THERAPY | Facility: CLINIC | Age: 70
End: 2025-04-08
Payer: MEDICARE

## 2025-04-08 DIAGNOSIS — Z47.89 ENCOUNTER FOR OTHER ORTHOPEDIC AFTERCARE: Primary | ICD-10-CM

## 2025-04-08 DIAGNOSIS — M75.101 TEAR OF RIGHT SUPRASPINATUS TENDON: ICD-10-CM

## 2025-04-08 PROCEDURE — 97110 THERAPEUTIC EXERCISES: CPT | Performed by: PHYSICAL THERAPIST

## 2025-04-08 PROCEDURE — 97140 MANUAL THERAPY 1/> REGIONS: CPT | Performed by: PHYSICAL THERAPIST

## 2025-04-08 NOTE — PROGRESS NOTES
"Daily Note     Today's date: 2025  Patient name: Kasie Kimble  : 1955  MRN: [id number]  Referring provider: Mitchell Grace*  Dx:   Encounter Diagnosis     ICD-10-CM    1. Encounter for other orthopedic aftercare  Z47.89       2. Tear of right supraspinatus tendon  M75.101                      Subjective: Pt denies pain upon arrival to therapy. Pt is no longer waking up with pain after sleeping. Pt is compliant in HEP.      Objective: See treatment diary below      Assessment: Tolerated treatment well. Patient exhibited good technique with therapeutic exercises. Pt is improving with PROM and has almost full motion. Pt is challenged by walking with wt overhead with fatigue. Pt has mild pain with manual stretching.      Plan: Continue per plan of care. Focus on improving end range ROM and strength     Precautions: PHASE 3 PER MD protocol below flowsheet  CO-MORBIDITIES:  HEP ACCESS CODE:  [id number]  FOTO Completed On: 3/26 (Score:  68  Score Predication:  66 )- pt exceeded score- do not repeat      POC Expires Reeval for Medicare to be completed Unit LImit Auth Expiration Date PT/OT/STVisit Limit   25 By visit  55 N/a N/a N/a    Completed on 3/26/25                   TREATMENT DIARY  Auth Status DATE 3/26 3/38 4/2 4/8   Approved Visit # [phone]    Remaining       MANUAL THERAPY       GHJ mobs grade II inf/post 2'   2'   R shoulder PROM/ MFR 10' 10' 10'    R upper trap/ rhomboid MFR       Progress note 8'   8'                 THERAPEUTIC EXERCISE HEP       UBE  3'/3' 3'/3' 3'/3' 3'/3'   Bicep curls   5# 20 5# 20 5#x20   Hammer Curls   5# 20 5# 20 5#x20   Standing IR stretch with strap  10\"x3   10\"x3   B TB ER  BTB x10 [place] 20 Plum 20 Plum x20   Supine AROM Flex        S/l shld ER AROM  3# x15   3#x15   Mod table push ups  x20 20 10    Planks    10\" 3    Prone Rows  3# x15 3# 20 3# 20 3# x20   Prone Ext  3# x15 3# 20 3# 20 3# x20   Shoulder flexion AROM to 90*     1# x10   Shoulder " "scap to 90*     1# x10   PNF D2 ext  GTB x10 GTB 10 GTB 20x    Prone shld horiz abd     10   NEUROMUSCULAR REEDUCATION         Supine punches   5# 20 5# 20    CC LDP  33# x20 33# 20 44# 20 44# x20   CC Rows  33# x20 33# 20 44# 20 44#x20   Wall slides: fwd/scap  X5 ea 10x ea     Post capsule stretch  3x20\"   20\"x3                                           THERAPEUTIC ACTIVITY        5 Cone placing fwd on shelf        5 cone placing side on shelf        Walking w/ wt overhead  1# 3 small laps 1# small 3laps 2# lrg 3laps 2# 3 large laps   Stat[place]  Red 10\"x3 Grn 10\" 3 GRN 10\" 3 Gr 10\"x3   High target taps on white white  1# x10 1# 10 2# 10 3#x10   GAIT TRAINING                                        MODALITIES       MHP in supine post tx 10' MH post 10' MH post 10' MH post 10' MH post                               "

## 2025-04-10 ENCOUNTER — OFFICE VISIT (OUTPATIENT)
Dept: PHYSICAL THERAPY | Facility: CLINIC | Age: 70
End: 2025-04-10
Payer: MEDICARE

## 2025-04-10 DIAGNOSIS — Z47.89 ENCOUNTER FOR OTHER ORTHOPEDIC AFTERCARE: Primary | ICD-10-CM

## 2025-04-10 DIAGNOSIS — M75.101 TEAR OF RIGHT SUPRASPINATUS TENDON: ICD-10-CM

## 2025-04-10 PROCEDURE — 97110 THERAPEUTIC EXERCISES: CPT

## 2025-04-10 PROCEDURE — 97112 NEUROMUSCULAR REEDUCATION: CPT

## 2025-04-10 PROCEDURE — 97140 MANUAL THERAPY 1/> REGIONS: CPT

## 2025-04-10 NOTE — PROGRESS NOTES
"Daily Note     Today's date: 4/10/2025  Patient name: Kasie Kimble  : 1955  MRN: [id number]  Referring provider: Mitchell Grace*  Dx:   Encounter Diagnosis     ICD-10-CM    1. Encounter for other orthopedic aftercare  Z47.89       2. Tear of right supraspinatus tendon  M75.101                      Subjective: Pt states feeling good overall but will like to get back to her ADL without limitation and regain strength.       Objective: See treatment diary below      Assessment: Introduced new TE to focus on pt personal goals. Pt was challenged however, pleased with new TE and highly motivated to get better. Per pt end session 7minutes early to attend to her mother.       Plan: Continue per plan of care. Focus on improving end range ROM and strength  Personal Goal: sport activity and reaching behind and above     Precautions: PHASE 3 PER MD protocol below flowsheet  CO-MORBIDITIES:  HEP ACCESS CODE:  [id number]  FOTO Completed On: 3/26 (Score:  68  Score Predication:  66 )- pt exceeded score- do not repeat      POC Expires Reeval for Medicare to be completed Unit LImit Auth Expiration Date PT/OT/STVisit Limit   25 By visit  55 N/a N/a N/a    Completed on 3/26/25                   TREATMENT DIARY  Auth Status DATE 4/2 4/8 4/10   Approved Visit # [phone]    Remaining      MANUAL THERAPY      GHJ mobs grade II inf/post  2'    R shoulder PROM/ MFR 10'  8'   R upper trap/ rhomboid MFR      Progress note  8'                THERAPEUTIC EXERCISE HEP      UBE  3'/3' 3'/3' 3'/3'   Bicep curls  5# 20 5#x20 6# 20   Hammer Curls  5# 20 5#x20 6# 20   Standing IR stretch with strap   10\"x3 10\" 3   B TB ER  [place] 20 [place] x20    S/l shld ER AROM   3#x15    Mod table push ups  10  20x   Planks  10\" 3     Prone Rows  3# 20 3# x20    Prone Ext  3# 20 3# x20    Shoulder flexion AROM to 90*   1# x10    Shoulder scap to 90*   1# x10    PNF D2 ext  GTB 20x  [place] 20   90/90 TB ER    OTB 20   90/90 TB IR    OTB 20      " "    Prone shld horiz abd   10    NEUROMUSCULAR REEDUCATION        Supine punches  5# 20     CC LDP  44# 20 44# x20 44# 20   CC Rows  44# 20 44#x20 44# 20   Wall slides: fwd/scap       Post capsule stretch   20\"x3 20\" 3   Slow under and over throw to trampoline     4# 20                               THERAPEUTIC ACTIVITY       5 Cone placing fwd on shelf       5 cone placing side on shelf       Walking w/ wt overhead  2# lrg 3laps 2# 3 large laps    Stat[place]  GRN 10\" 3 Gr 10\"x3    High target taps on white white  2# 10 3#x10    GAIT TRAINING                                   MODALITIES      MHP in supine post tx 10' MH post 10' MH post def                              "

## 2025-04-15 ENCOUNTER — OFFICE VISIT (OUTPATIENT)
Dept: PHYSICAL THERAPY | Facility: CLINIC | Age: 70
End: 2025-04-15
Attending: ORTHOPAEDIC SURGERY
Payer: MEDICARE

## 2025-04-15 DIAGNOSIS — Z47.89 ENCOUNTER FOR OTHER ORTHOPEDIC AFTERCARE: Primary | ICD-10-CM

## 2025-04-15 DIAGNOSIS — M75.101 TEAR OF RIGHT SUPRASPINATUS TENDON: ICD-10-CM

## 2025-04-15 PROCEDURE — 97112 NEUROMUSCULAR REEDUCATION: CPT

## 2025-04-15 PROCEDURE — 97110 THERAPEUTIC EXERCISES: CPT

## 2025-04-15 PROCEDURE — 97140 MANUAL THERAPY 1/> REGIONS: CPT

## 2025-04-17 ENCOUNTER — OFFICE VISIT (OUTPATIENT)
Dept: PHYSICAL THERAPY | Facility: CLINIC | Age: 70
End: 2025-04-17
Attending: ORTHOPAEDIC SURGERY
Payer: MEDICARE

## 2025-04-17 DIAGNOSIS — M75.101 TEAR OF RIGHT SUPRASPINATUS TENDON: ICD-10-CM

## 2025-04-17 DIAGNOSIS — Z47.89 ENCOUNTER FOR OTHER ORTHOPEDIC AFTERCARE: Primary | ICD-10-CM

## 2025-04-17 PROCEDURE — 97140 MANUAL THERAPY 1/> REGIONS: CPT

## 2025-04-17 PROCEDURE — 97110 THERAPEUTIC EXERCISES: CPT

## 2025-04-17 PROCEDURE — 97112 NEUROMUSCULAR REEDUCATION: CPT

## 2025-04-17 NOTE — PROGRESS NOTES
"Daily Note     Today's date: 2025  Patient name: Kasie Kimble  : 1955  MRN: [id number]  Referring provider: Mitchell Grace*  Dx:   Encounter Diagnosis     ICD-10-CM    1. Encounter for other orthopedic aftercare  Z47.89       2. Tear of right supraspinatus tendon  M75.101           Start Time: 1000  Stop Time: 1050  Total time in clinic (min): 50 minutes    Subjective: Presents to therapy noting no objective changes since her last session two days ago. Denies any soreness or pain following last visit.       Objective: See treatment diary below      Assessment: Patient able to complete full program with good effort throughout, continuing to focus on rotator cuff strengthening and range of motion. Emphasized  Heavy tactile cueing to prevent excessive wrist extension during ER/IR with poor carryover. Visibly decreased right shoulder flexion PROM, slightly improving post manual stretching.  Patient demonstrated fatigue post treatment and would benefit from continued PT      Plan: Continue per plan of care.      Precautions: PHASE 3 PER MD protocol below flowsheet  CO-MORBIDITIES:  HEP ACCESS CODE:  [id number]  FOTO Completed On: 3/26 (Score:  68  Score Predication:  66 )- pt exceeded score- do not repeat      POC Expires Reeval for Medicare to be completed Unit LImit Auth Expiration Date PT/OT/STVisit Limit   25 By visit  55 N/a N/a N/a    Completed on 3/26/25                   TREATMENT DIARY  Auth Status DATE 4/2 4/8 4/10 4/15 4/17   Approved Visit # [phone] 51    Remaining        MANUAL THERAPY        GHJ mobs grade II inf/post  2'      R shoulder PROM/ MFR 10'  8' 8' 8'   R upper trap/ rhomboid MFR        Progress note  8'                      THERAPEUTIC EXERCISE HEP        UBE  3'/3' 3'/3' 3'/3' 3'/3' 3'/3'   Bicep curls  5# 20 5#x20 6# 20 6# 6#20   Hammer Curls  5# 20 5#x20 6# 20 6# 20 6#20   Standing IR stretch with strap   10\"x3 10\" 3 10\" 3 10''3   B TB ER  [place] 20 [place] x20  " "    S/l shld ER AROM   3#x15      Mod table push ups  10  20x 20x 20x   Planks  10\" 3       Prone Rows  3# 20 3# x20  3# x20 4# 20   Prone Ext  3# 20 3# x20  3# x20 4# 20   Shoulder flexion AROM to 90*   1# x10      Shoulder scap to 90*   1# x10      PNF D2 ext  GTB 20x  Plum 20 Plum 20 [place] 20   90/90 TB ER    OTB 20 OTB 20 OTB 20   90/90 TB IR    OTB 20 OTB 20 OTB 20            Prone shld horiz abd   10      NEUROMUSCULAR REEDUCATION          Supine punches  5# 20       CC LDP  44# 20 44# x20 44# 20 44# 20 44# 20   CC Rows  44# 20 44#x20 44# 20 44# 20 44# 20   Wall slides: fwd/scap         Post capsule stretch   20\"x3 20\" 3 20\" 3 20''3   Slow under and over throw to trampoline     4# 20 4# 20 4# 20                                       THERAPEUTIC ACTIVITY         5 Cone placing fwd on shelf         5 cone placing side on shelf         Walking w/ wt overhead  2# lrg 3laps 2# 3 large laps      [place]  GRN 10\" 3 Gr 10\"x3      High target taps on white white  2# 10 3#x10      GAIT TRAINING                                             MODALITIES        MHP in supine post tx 10' MH post 10' MH post def                                    "

## 2025-04-22 ENCOUNTER — OFFICE VISIT (OUTPATIENT)
Dept: FAMILY MEDICINE CLINIC | Facility: HOSPITAL | Age: 70
End: 2025-04-22
Payer: MEDICARE

## 2025-04-22 ENCOUNTER — APPOINTMENT (OUTPATIENT)
Dept: PHYSICAL THERAPY | Facility: CLINIC | Age: 70
End: 2025-04-22
Attending: ORTHOPAEDIC SURGERY
Payer: MEDICARE

## 2025-04-22 VITALS
SYSTOLIC BLOOD PRESSURE: 118 MMHG | OXYGEN SATURATION: 98 % | TEMPERATURE: 96.8 F | WEIGHT: 129 LBS | BODY MASS INDEX: 23.74 KG/M2 | DIASTOLIC BLOOD PRESSURE: 64 MMHG | HEIGHT: 62 IN | HEART RATE: 88 BPM

## 2025-04-22 DIAGNOSIS — J02.9 PHARYNGITIS, UNSPECIFIED ETIOLOGY: ICD-10-CM

## 2025-04-22 DIAGNOSIS — J04.0 LARYNGITIS: ICD-10-CM

## 2025-04-22 DIAGNOSIS — J06.9 VIRAL UPPER RESPIRATORY INFECTION: Primary | ICD-10-CM

## 2025-04-22 LAB — S PYO AG THROAT QL: NEGATIVE

## 2025-04-22 PROCEDURE — 87070 CULTURE OTHR SPECIMN AEROBIC: CPT | Performed by: NURSE PRACTITIONER

## 2025-04-22 PROCEDURE — 99213 OFFICE O/P EST LOW 20 MIN: CPT | Performed by: NURSE PRACTITIONER

## 2025-04-22 PROCEDURE — 87880 STREP A ASSAY W/OPTIC: CPT | Performed by: NURSE PRACTITIONER

## 2025-04-22 PROCEDURE — G2211 COMPLEX E/M VISIT ADD ON: HCPCS | Performed by: NURSE PRACTITIONER

## 2025-04-22 NOTE — PROGRESS NOTES
Name: Kasie Kimble      : 1955      MRN: [id number]  Encounter Provider: SANDRA Torres  Encounter Date: 2025   Encounter department: [facility] CARE [address]   :  Assessment & Plan  Viral upper respiratory infection  Normal exam with short duration of symptoms.   Symptomatic support with OTC medications.   Call if not any better in 5-7 days or any significant worsening.        Laryngitis  Tea with honey. Warm salt water gargles.   Voice rest.        Pharyngitis, unspecified etiology  Rapid strep negative.   Send throat culture to confirm.   Orders:    POCT rapid ANTIGEN strepA    Throat culture           History of Present Illness   Has a sore throat, cough and lost voice. Started 5 days ago. No fever. Has runny nose. No nasal congestion. No sinus pain or pressure. Has been taking dayQuil and Advil but upset her stomach. Has post nasal drip. No sob or wheezing. Not a smoker. No N/V/D. No HA or body aches. Not getting any better. Denies seasonal allergies.     Sore Throat   This is a new problem. The current episode started in the past 7 days. The problem has been unchanged. The pain is worse on the right side. There has been no fever. The pain is at a severity of 5/10. The pain is moderate. Associated symptoms include abdominal pain, coughing, a hoarse voice, neck pain, swollen glands and trouble swallowing. Pertinent negatives include no congestion, diarrhea, ear pain, headaches, shortness of breath or vomiting.     Review of Systems   Constitutional:  Negative for chills and fever.   HENT:  Positive for hoarse voice, postnasal drip, rhinorrhea, sore throat and trouble swallowing. Negative for congestion, ear pain, sinus pressure and sinus pain.    Respiratory:  Positive for cough. Negative for shortness of breath and wheezing.    Gastrointestinal:  Positive for abdominal pain. Negative for diarrhea, nausea and vomiting.   Musculoskeletal:  Positive for neck pain.  "Negative for myalgias.   Neurological:  Negative for headaches.       Objective   /64 (Patient Position: Sitting, Cuff Size: Standard)   Pulse 88   Temp (!) 96.8 °F (36 °C) (Tympanic)   Ht 5' 2\" (1.575 m)   Wt 58.5 kg (129 lb)   SpO2 98%   BMI 23.59 kg/m²      Physical Exam  Vitals reviewed.   Constitutional:       General: She is not in acute distress.     Appearance: Normal appearance. She is not ill-appearing.   HENT:      Right Ear: Tympanic membrane, ear canal and external ear normal.      Left Ear: Tympanic membrane and ear canal normal.      Mouth/Throat:      Mouth: Mucous membranes are moist.      Pharynx: Oropharynx is clear.      Comments: Voice is hoarse  Cardiovascular:      Rate and Rhythm: Normal rate and regular rhythm.      Heart sounds: Normal heart sounds. No murmur heard.  Pulmonary:      Effort: Pulmonary effort is normal.      Breath sounds: Normal breath sounds.      Comments: Dry cough noted  Lymphadenopathy:      Cervical: No cervical adenopathy.   Skin:     General: Skin is warm and dry.   Neurological:      Mental Status: She is alert and oriented to person, place, and time.   Psychiatric:         Mood and Affect: Mood normal.         Behavior: Behavior normal.         Thought Content: Thought content normal.         Judgment: Judgment normal.         "

## 2025-04-23 ENCOUNTER — RESULTS FOLLOW-UP (OUTPATIENT)
Dept: FAMILY MEDICINE CLINIC | Facility: HOSPITAL | Age: 70
End: 2025-04-23

## 2025-04-24 ENCOUNTER — APPOINTMENT (OUTPATIENT)
Dept: PHYSICAL THERAPY | Facility: CLINIC | Age: 70
End: 2025-04-24
Attending: ORTHOPAEDIC SURGERY
Payer: MEDICARE

## 2025-04-24 LAB — BACTERIA THROAT CULT: NORMAL

## 2025-04-24 NOTE — TELEPHONE ENCOUNTER
----- Message from SANDRA Torres sent at 4/24/2025  9:37 AM EDT -----  Throat culture is negative for bacterial infection.

## 2025-04-26 ENCOUNTER — OFFICE VISIT (OUTPATIENT)
Dept: URGENT CARE | Facility: CLINIC | Age: 70
End: 2025-04-26
Payer: MEDICARE

## 2025-04-26 VITALS
HEIGHT: 62 IN | SYSTOLIC BLOOD PRESSURE: 126 MMHG | TEMPERATURE: 98.4 F | DIASTOLIC BLOOD PRESSURE: 70 MMHG | OXYGEN SATURATION: 98 % | BODY MASS INDEX: 23.74 KG/M2 | RESPIRATION RATE: 16 BRPM | WEIGHT: 129 LBS | HEART RATE: 88 BPM

## 2025-04-26 DIAGNOSIS — J01.10 ACUTE NON-RECURRENT FRONTAL SINUSITIS: Primary | ICD-10-CM

## 2025-04-26 PROCEDURE — G0463 HOSPITAL OUTPT CLINIC VISIT: HCPCS | Performed by: FAMILY MEDICINE

## 2025-04-26 PROCEDURE — 99213 OFFICE O/P EST LOW 20 MIN: CPT | Performed by: FAMILY MEDICINE

## 2025-04-26 RX ORDER — AMOXICILLIN 500 MG/1
500 CAPSULE ORAL EVERY 12 HOURS SCHEDULED
Qty: 14 CAPSULE | Refills: 0 | Status: SHIPPED | OUTPATIENT
Start: 2025-04-26 | End: 2025-05-03

## 2025-04-26 RX ORDER — METHYLPREDNISOLONE 4 MG/1
TABLET ORAL
Qty: 21 TABLET | Refills: 0 | Status: SHIPPED | OUTPATIENT
Start: 2025-04-26

## 2025-04-26 RX ORDER — BENZONATATE 200 MG/1
200 CAPSULE ORAL 3 TIMES DAILY PRN
Qty: 30 CAPSULE | Refills: 0 | Status: SHIPPED | OUTPATIENT
Start: 2025-04-26

## 2025-04-26 NOTE — PROGRESS NOTES
[facility]        NAME: Kasie Kimble is a 69 y.o. female  : 1955    MRN: [id number]  DATE: 2025  TIME: 1:31 PM    Assessment and Plan   Acute non-recurrent frontal sinusitis [J01.10]  1. Acute non-recurrent frontal sinusitis  amoxicillin (AMOXIL) 500 mg capsule    methylPREDNISolone 4 MG tablet therapy pack    benzonatate (TESSALON) 200 MG capsule            Patient Instructions       Follow up with PCP in 3-5 days.  Proceed to  ER if symptoms worsen.    If tests have been performed at [facility], our office will contact you with results if changes need to be made to the care plan discussed with you at the visit.  You can review your full results on [facility].    Chief Complaint     Chief Complaint   Patient presents with    Cold Like Symptoms     Pt reports cold like symptoms with onset one week ago. States was seen by PCP 25. States cough has since progressed. C/ left ear pain and fullness with onset yesterday. Managing with DayQuil/NyQuil, and Tylenol.         History of Present Illness       69-year-old female presenting with 1 week history of of left ear pain, cough and headaches.  She also reports increased sinus pressure and runny nose.        Review of Systems   Review of Systems   Constitutional: Negative.    HENT:  Positive for congestion.    Eyes: Negative.    Respiratory:  Positive for cough.    Cardiovascular: Negative.    Gastrointestinal: Negative.    Genitourinary: Negative.    Musculoskeletal:  Positive for myalgias.   Skin: Negative.    Allergic/Immunologic: Negative.    Neurological:  Positive for headaches.   Hematological: Negative.    Psychiatric/Behavioral: Negative.           Current Medications       Current Outpatient Medications:     amoxicillin (AMOXIL) 500 mg capsule, Take 1 capsule (500 mg total) by mouth every 12 (twelve) hours for 7 days, Disp: 14 capsule, Rfl: 0    benzonatate (TESSALON) 200 MG capsule, Take 1 capsule (200 mg total)  by mouth 3 (three) times a day as needed for cough, Disp: 30 capsule, Rfl: 0    Cholecalciferol (Vitamin D3 Ultra Strength) 125 MCG (5000 UT) capsule, Take 5,000 Units by mouth daily, Disp: , Rfl:     cycloSPORINE (RESTASIS) 0.05 % ophthalmic emulsion, Apply 1 drop to eye every 12 (twelve) hours, Disp: , Rfl:     methylPREDNISolone 4 MG tablet therapy pack, Use as directed on package, Disp: 21 tablet, Rfl: 0    Multiple Vitamin (MULTIVITAMINS PO), Take 1 tablet by mouth daily, Disp: , Rfl:     Probiotic Product (PROBIOTIC ADVANCED PO), Take 1 tablet by mouth daily, Disp: , Rfl:     valACYclovir (VALTREX) 500 mg tablet, Take 1 tablet (500 mg total) by mouth daily 1 tab PO q 12 hrs x 3 days as needed, Disp: 90 tablet, Rfl: 2    Current Allergies     Allergies as of 04/26/2025 - Reviewed 04/26/2025   Allergen Reaction Noted    Fish-derived products - food allergy  08/05/2015    Iodine - food allergy Abdominal Pain and Hives 08/02/2016    Iodine strong Hives 04/26/2025            The following portions of the patient's history were reviewed and updated as appropriate: allergies, current medications, past family history, past medical history, past social history, past surgical history and problem list.     Past Medical History:   Diagnosis Date    Diverticulitis     Diverticulosis     Thyroid nodule        Past Surgical History:   Procedure Laterality Date    CATARACT EXTRACTION      COLONOSCOPY      CYST REMOVAL      hand excision of tendon cyst    [place] SURGICAL ARTHROSCOPY SHOULDER W/ROTATOR CUFF RPR Right 9/25/2024    Procedure: REPAIR ROTATOR CUFF  ARTHROSCOPIC,BICEP TENODESIS,SUBACROMIAL DECOMPRESSION;  Surgeon: Mitchell Grace MD;  Location: [facility] MAIN OR;  Service: Orthopedics       Family History   Problem Relation Age of Onset    Hypertension Mother     Breast cancer Mother 93    Stroke Mother     Heart attack Father     No Known Problems Sister     No Known Problems Sister     No Known Problems Daughter  "    No Known Problems Maternal Grandmother     No Known Problems Maternal Grandfather     Ovarian cancer Paternal Grandmother         age unknown    No Known Problems Paternal Grandfather     Diabetes Brother     Hepatitis Brother         type C viral    Kidney disease Brother         kidney transplant    Coronary artery disease Brother     Prostate cancer Brother 74    No Known Problems Brother     Cancer Brother 60        Soft tissue sarcoma    No Known Problems Maternal Aunt     No Known Problems Maternal Aunt     No Known Problems Maternal Aunt     No Known Problems Paternal Aunt     No Known Problems Paternal Aunt     No Known Problems Paternal Aunt          Medications have been verified.        Objective   /70 (BP Location: Right arm, Patient Position: Sitting, Cuff Size: Standard)   Pulse 88   Temp 98.4 °F (36.9 °C) (Tympanic)   Resp 16   Ht 5' 2\" (1.575 m)   Wt 58.5 kg (129 lb)   SpO2 98%   BMI 23.59 kg/m²   No LMP recorded. Patient is postmenopausal.       Physical Exam     Physical Exam  Vitals and nursing note reviewed.   Constitutional:       Appearance: She is well-developed.   HENT:      Head: Normocephalic.      Nose: Congestion present.   Eyes:      Pupils: Pupils are equal, round, and reactive to light.   Cardiovascular:      Rate and Rhythm: Normal rate and regular rhythm.   Pulmonary:      Effort: Pulmonary effort is normal.   Abdominal:      General: Abdomen is flat.   Musculoskeletal:         General: Normal range of motion.      Cervical back: Normal range of motion.   Skin:     General: Skin is warm and dry.   Neurological:      Mental Status: She is alert and oriented to person, place, and time.                   "

## 2025-04-29 ENCOUNTER — OFFICE VISIT (OUTPATIENT)
Dept: PHYSICAL THERAPY | Facility: CLINIC | Age: 70
End: 2025-04-29
Attending: ORTHOPAEDIC SURGERY
Payer: MEDICARE

## 2025-04-29 DIAGNOSIS — M75.101 TEAR OF RIGHT SUPRASPINATUS TENDON: ICD-10-CM

## 2025-04-29 DIAGNOSIS — Z47.89 ENCOUNTER FOR OTHER ORTHOPEDIC AFTERCARE: Primary | ICD-10-CM

## 2025-04-29 PROCEDURE — 97110 THERAPEUTIC EXERCISES: CPT

## 2025-04-29 PROCEDURE — 97112 NEUROMUSCULAR REEDUCATION: CPT

## 2025-04-29 NOTE — PROGRESS NOTES
"Daily Note     Today's date: 2025  Patient name: Kasie Kimble  : 1955  MRN: [id number]  Referring provider: Mitchell Grace*  Dx:   Encounter Diagnosis     ICD-10-CM    1. Encounter for other orthopedic aftercare  Z47.89       2. Tear of right supraspinatus tendon  M75.101                      Subjective: Pt states her arm is doing very good.       Objective: See treatment diary below      Assessment: Progressed below TE with great tolerance, pt will be ready to be d/c upon nv.  Patient demonstrated fatigue post treatment and would benefit from continued PT      Plan: d/c upon nv.      Precautions: PHASE 3 PER MD protocol below flowsheet  CO-MORBIDITIES:  HEP ACCESS CODE:  [id number]  FOTO Completed On: 3/26 (Score:  68  Score Predication:  66 )- pt exceeded score- do not repeat      POC Expires Reeval for Medicare to be completed Unit LImit Auth Expiration Date PT/OT/STVisit Limit   25 By visit  55 N/a N/a N/a    Completed on 3/26/25                   TREATMENT DIARY  Auth Status DATE b4/10 4/15 4/17 4/29   Approved Visit # [phone]    Remaining       MANUAL THERAPY       GHJ mobs grade II inf/post       R shoulder PROM/ MFR 8' 8' 8' 8'   R upper trap/ rhomboid MFR       Progress note                     THERAPEUTIC EXERCISE HEP       UBE  3'/3' 3'/3' 3'/3' 3'/3'   Bicep curls  6# 20 6# 6#20 7.5# 20   Hammer Curls  6# 20 6# 20 6#20 7.5# 20   Standing IR stretch with strap  10\" 3 10\" 3 10''3 10\" 3    B TB ER        S/l shld ER AROM        Mod table push ups  20x 20x 20x    Planks        Prone Rows   3# x20 4# 20 7.5# 20   Prone Ext   3# x20 4# 20 7.5# 20      Shoulder flexion AROM to 90*        Shoulder scap to 90*        PNF D2 ext  Plum 20 Plum 20 Plum 20 [place] 20   90/90 TB ER  OTB 20 OTB 20 OTB 20 OTB 20   90/90 TB IR  OTB 20 OTB 20 OTB 20 OTB 20           Prone shld horiz abd        NEUROMUSCULAR REEDUCATION         Supine punches        CC LDP  44# 20 44# 20 44# 20 44# 20   CC " "Rows  44# 20 44# 20 44# 20 44# 20   Wall slides: fwd/scap        Post capsule stretch  20\" 3 20\" 3 20''3 20\" 3w   Slow under and over throw to trampoline   4# 20 4# 20 4# 20 4# 20                                   THERAPEUTIC ACTIVITY        5 Cone placing fwd on shelf        5 cone placing side on shelf        Walking w/ wt overhead        [place]        High target taps on white white        GAIT TRAINING                                        MODALITIES       MHP in supine post tx def                                    "

## 2025-05-01 ENCOUNTER — OFFICE VISIT (OUTPATIENT)
Dept: PHYSICAL THERAPY | Facility: CLINIC | Age: 70
End: 2025-05-01
Attending: ORTHOPAEDIC SURGERY
Payer: MEDICARE

## 2025-05-01 DIAGNOSIS — Z47.89 ENCOUNTER FOR OTHER ORTHOPEDIC AFTERCARE: Primary | ICD-10-CM

## 2025-05-01 DIAGNOSIS — M75.101 TEAR OF RIGHT SUPRASPINATUS TENDON: ICD-10-CM

## 2025-05-01 PROCEDURE — 97110 THERAPEUTIC EXERCISES: CPT

## 2025-05-01 PROCEDURE — 97112 NEUROMUSCULAR REEDUCATION: CPT

## 2025-05-01 NOTE — PROGRESS NOTES
"Daily Note     Today's date: 2025  Patient name: Kasie Kimble  : 1955  MRN: [id number]  Referring provider: Mitchell Grace*  Dx:   Encounter Diagnosis     ICD-10-CM    1. Encounter for other orthopedic aftercare  Z47.89       2. Tear of right supraspinatus tendon  M75.101                      Subjective: Pt states her arm is doing great.    Objective: See treatment diary below      Assessment: reviewed HEP with pt, answered pt questions and concerns to pt satisfaction.       Plan: d/c to hep     Precautions: PHASE 3 PER MD protocol below flowsheet  CO-MORBIDITIES:  HEP ACCESS CODE:  [id number]  FOTO Completed On: 3/26 (Score:  68  Score Predication:  66 )- pt exceeded score- do not repeat      POC Expires Reeval for Medicare to be completed Unit LImit Auth Expiration Date PT/OT/STVisit Limit   25 By visit  55 N/a N/a N/a    Completed on 3/26/25                   TREATMENT DIARY  Auth Status DATE 4/15 4/17 4/29 5/1   Approved Visit # [phone]    Remaining       MANUAL THERAPY       GHJ mobs grade II inf/post       R shoulder PROM/ MFR 8' 8' 8' 8'   R upper trap/ rhomboid MFR       Progress note                     THERAPEUTIC EXERCISE HEP       UBE  3'/3' 3'/3' 3'/3' 3'/3'   Bicep curls  6# 6#20 7.5# 20 10# 20   Hammer Curls  6# 20 6#20 7.5# 20 10# 20   Standing IR stretch with strap  10\" 3 10''3 10\" 3  10\" 3   B TB ER        S/l shld ER AROM        Mod table push ups  20x 20x     Planks        Prone Rows  3# x20 4# 20 7.5# 20 7.5# 20   Prone Ext  3# x20 4# 20 7.5# 20    7.5# 20   Shoulder flexion AROM to 90*        Shoulder scap to 90*        PNF D2 ext  Plum 20 Plum 20 Plum 20 [employer] 20   90/90 TB ER  OTB 20 OTB 20 OTB 20 OTB 20   90/90 TB IR  OTB 20 OTB 20 OTB 20 OTB 20           Prone shld horiz abd        NEUROMUSCULAR REEDUCATION         Supine punches        CC LDP  44# 20 44# 20 44# 20 44# 20   CC Rows  44# 20 44# 20 44# 20 44# 20   Wall slides: fwd/scap        Post capsule " "stretch  20\" 3 20''3 20\" 3 20\"3   Slow under and over throw to trampoline   4# 20 4# 20 4# 20 4# 20                                   THERAPEUTIC ACTIVITY        5 Cone placing fwd on shelf        5 cone placing side on shelf        Walking w/ wt overhead        Stat[place] of [place]        High target taps on white white        GAIT TRAINING                                        MODALITIES       MHP in supine post tx                                     "

## 2025-05-02 ENCOUNTER — OFFICE VISIT (OUTPATIENT)
Dept: FAMILY MEDICINE CLINIC | Facility: HOSPITAL | Age: 70
End: 2025-05-02
Payer: MEDICARE

## 2025-05-02 VITALS
SYSTOLIC BLOOD PRESSURE: 108 MMHG | HEIGHT: 62 IN | TEMPERATURE: 98.1 F | DIASTOLIC BLOOD PRESSURE: 60 MMHG | WEIGHT: 128.6 LBS | BODY MASS INDEX: 23.66 KG/M2 | HEART RATE: 85 BPM | OXYGEN SATURATION: 98 %

## 2025-05-02 DIAGNOSIS — J06.9 VIRAL URI WITH COUGH: Primary | ICD-10-CM

## 2025-05-02 PROCEDURE — G2211 COMPLEX E/M VISIT ADD ON: HCPCS | Performed by: FAMILY MEDICINE

## 2025-05-02 PROCEDURE — 99213 OFFICE O/P EST LOW 20 MIN: CPT | Performed by: FAMILY MEDICINE

## 2025-05-02 RX ORDER — LORATADINE 10 MG/1
10 TABLET ORAL DAILY
COMMUNITY

## 2025-05-02 NOTE — PROGRESS NOTES
"Name: Kasie Kimble      : 1955      MRN: [id number]  Encounter Provider: Zoltan Stone MD  Encounter Date: 2025   Encounter department: [facility] CARE [address]   :  Assessment & Plan  Viral URI with cough  Discussed I think she has an ongoing viral URI that needs to run its course.     May have undrlying allergies as well. Can try otc zyrtec and or flonase nasal spray.   Increase fluids. Can consider vitamin C and zinc.     To call next week with worsening sytmpoms.                   History of Present Illness   Here for ongoing nasal congestion, ear pain, sore throat, cough, runny notes, watery eyes.   No fever, no chills, no chest pain, no wheezing.   Has been in the office and urgent care.  gave her amoxil and tessaoln perles with no benefit.   Sore throat and coughing is improved. Ongoing ear pressure and congestion.       Review of Systems   Constitutional: Negative.  Negative for activity change, appetite change, chills, diaphoresis, fatigue and fever.   HENT:  Negative for congestion, facial swelling and sore throat.    Respiratory: Negative.  Negative for apnea, cough, chest tightness and shortness of breath.    Cardiovascular: Negative.  Negative for chest pain and palpitations.   Gastrointestinal: Negative.  Negative for abdominal distention, abdominal pain, blood in stool, constipation, diarrhea and nausea.   Genitourinary: Negative.  Negative for difficulty urinating, dysuria, flank pain and frequency.       Objective   /60 (BP Location: Left arm, Patient Position: Sitting)   Pulse 85   Temp 98.1 °F (36.7 °C)   Ht 5' 2\" (1.575 m)   Wt 58.3 kg (128 lb 9.6 oz)   SpO2 98%   BMI 23.52 kg/m²      Physical Exam  Vitals and nursing note reviewed.   Constitutional:       Appearance: Normal appearance.   HENT:      Head: Normocephalic.      Right Ear: Tympanic membrane, ear canal and external ear normal.      Left Ear: Tympanic membrane, ear canal and " external ear normal.      Nose: Nose normal.      Mouth/Throat:      Mouth: Mucous membranes are moist.   Eyes:      Extraocular Movements: Extraocular movements intact.      Conjunctiva/sclera: Conjunctivae normal.      Pupils: Pupils are equal, round, and reactive to light.      Comments: Watery eyes   Cardiovascular:      Rate and Rhythm: Normal rate and regular rhythm.      Pulses: Normal pulses.      Heart sounds: Normal heart sounds.   Musculoskeletal:      Cervical back: Normal range of motion and neck supple.   Neurological:      General: No focal deficit present.      Mental Status: She is alert and oriented to person, place, and time.   Psychiatric:         Mood and Affect: Mood normal.         Behavior: Behavior normal.          Yes

## 2025-05-26 PROBLEM — J01.10 ACUTE NON-RECURRENT FRONTAL SINUSITIS: Status: RESOLVED | Noted: 2025-04-26 | Resolved: 2025-05-26

## 2025-08-04 ENCOUNTER — OFFICE VISIT (OUTPATIENT)
Dept: OBGYN CLINIC | Facility: CLINIC | Age: 70
End: 2025-08-04
Payer: MEDICARE

## 2025-08-04 VITALS — DIASTOLIC BLOOD PRESSURE: 82 MMHG | BODY MASS INDEX: 23.56 KG/M2 | SYSTOLIC BLOOD PRESSURE: 110 MMHG | WEIGHT: 128.8 LBS

## 2025-08-04 DIAGNOSIS — N30.90 RECURRENT CYSTITIS: Primary | ICD-10-CM

## 2025-08-04 DIAGNOSIS — N89.8 VAGINAL IRRITATION: ICD-10-CM

## 2025-08-04 DIAGNOSIS — N89.8 VAGINAL DISCHARGE: ICD-10-CM

## 2025-08-04 DIAGNOSIS — B00.9 RECURRENT HERPES SIMPLEX: ICD-10-CM

## 2025-08-04 PROCEDURE — 99213 OFFICE O/P EST LOW 20 MIN: CPT | Performed by: OBSTETRICS & GYNECOLOGY

## 2025-08-04 PROCEDURE — 81514 NFCT DS BV&VAGINITIS DNA ALG: CPT | Performed by: OBSTETRICS & GYNECOLOGY

## 2025-08-04 RX ORDER — VALACYCLOVIR HYDROCHLORIDE 500 MG/1
TABLET, FILM COATED ORAL
Qty: 10 TABLET | Refills: 3 | Status: SHIPPED | OUTPATIENT
Start: 2025-08-04 | End: 2025-08-08

## 2025-08-04 RX ORDER — NITROFURANTOIN MACROCRYSTALS 50 MG/1
CAPSULE ORAL
Qty: 30 CAPSULE | Refills: 3 | Status: SHIPPED | OUTPATIENT
Start: 2025-08-04

## 2025-08-11 ENCOUNTER — TELEPHONE (OUTPATIENT)
Dept: FAMILY MEDICINE CLINIC | Facility: HOSPITAL | Age: 70
End: 2025-08-11

## 2025-08-11 ENCOUNTER — TELEPHONE (OUTPATIENT)
Age: 70
End: 2025-08-11

## 2025-08-15 ENCOUNTER — OFFICE VISIT (OUTPATIENT)
Dept: FAMILY MEDICINE CLINIC | Facility: HOSPITAL | Age: 70
End: 2025-08-15
Payer: MEDICARE

## (undated) DEVICE — GLOVE SRG BIOGEL ECLIPSE 7

## (undated) DEVICE — TUBING SUCTION 5MM X 12 FT

## (undated) DEVICE — VAPR COOLPULSE 90 ELECTRODE 90 DEGREES SUCTION WITH INTEGRATED HANDPIECE: Brand: VAPR COOLPULSE

## (undated) DEVICE — DRESSING MEPILEX AG 4 X 5 IN

## (undated) DEVICE — BLADE SHAVER DISSECTOR 3.5MM 13CM COOLCUT

## (undated) DEVICE — SHOULDER SUSPENSION KIT 6 PER BOX

## (undated) DEVICE — INTENDED FOR TISSUE SEPARATION, AND OTHER PROCEDURES THAT REQUIRE A SHARP SURGICAL BLADE TO PUNCTURE OR CUT.: Brand: BARD-PARKER ® CARBON RIB-BACK BLADES

## (undated) DEVICE — SKN PRP WNG SPNGE PVP SCRB STR: Brand: MEDLINE INDUSTRIES, INC.

## (undated) DEVICE — PACK PBDS SHOULDER ARTHROSCOPY RF

## (undated) DEVICE — EXPRESSEW III SUTURE NEEDLE FOR USE WITH EXPRESSEW II OR III SUTURE PASSER: Brand: EXPRESSEW

## (undated) DEVICE — 3M™ STERI-STRIP™ REINFORCED ADHESIVE SKIN CLOSURES, R1547, 1/2 IN X 4 IN (12 MM X 100 MM), 6 STRIPS/ENVELOPE: Brand: 3M™ STERI-STRIP™

## (undated) DEVICE — THREADED CLEAR CANNULA WITH OBTURATOR 8.5MM X 75MM

## (undated) DEVICE — THREADED CLEAR CANNULA WITH OBTURATOR 7MM X 75MM

## (undated) DEVICE — SUT MONOCRYL 4-0 PS-2 27 IN Y426H

## (undated) DEVICE — GLOVE INDICATOR PI UNDERGLOVE SZ 7.5 BLUE

## (undated) DEVICE — GLOVE SRG BIOGEL 7.5

## (undated) DEVICE — BLADE SHAVER DISSECTOR 4MM 13CM COOLCUT

## (undated) DEVICE — DRESSING MEPILEX AG BORDER 4 X 4 IN